# Patient Record
Sex: FEMALE | Race: WHITE | Employment: UNEMPLOYED | URBAN - METROPOLITAN AREA
[De-identification: names, ages, dates, MRNs, and addresses within clinical notes are randomized per-mention and may not be internally consistent; named-entity substitution may affect disease eponyms.]

---

## 2021-01-01 ENCOUNTER — APPOINTMENT (OUTPATIENT)
Dept: GENERAL RADIOLOGY | Age: 71
DRG: 870 | End: 2021-01-01
Payer: MEDICARE

## 2021-01-01 ENCOUNTER — ANESTHESIA (OUTPATIENT)
Dept: OPERATING ROOM | Age: 71
DRG: 870 | End: 2021-01-01
Payer: MEDICARE

## 2021-01-01 ENCOUNTER — HOSPITAL ENCOUNTER (INPATIENT)
Age: 71
LOS: 14 days | DRG: 870 | End: 2021-04-26
Attending: EMERGENCY MEDICINE | Admitting: INTERNAL MEDICINE
Payer: MEDICARE

## 2021-01-01 ENCOUNTER — APPOINTMENT (OUTPATIENT)
Dept: ULTRASOUND IMAGING | Age: 71
DRG: 870 | End: 2021-01-01
Payer: MEDICARE

## 2021-01-01 ENCOUNTER — ANESTHESIA EVENT (OUTPATIENT)
Dept: OPERATING ROOM | Age: 71
DRG: 870 | End: 2021-01-01
Payer: MEDICARE

## 2021-01-01 ENCOUNTER — APPOINTMENT (OUTPATIENT)
Dept: CT IMAGING | Age: 71
DRG: 870 | End: 2021-01-01
Payer: MEDICARE

## 2021-01-01 VITALS
DIASTOLIC BLOOD PRESSURE: 70 MMHG | RESPIRATION RATE: 25 BRPM | OXYGEN SATURATION: 95 % | TEMPERATURE: 96.4 F | HEIGHT: 62 IN | BODY MASS INDEX: 41.24 KG/M2 | WEIGHT: 224.1 LBS | SYSTOLIC BLOOD PRESSURE: 124 MMHG

## 2021-01-01 DIAGNOSIS — J96.01 ACUTE RESPIRATORY FAILURE WITH HYPOXIA (HCC): ICD-10-CM

## 2021-01-01 DIAGNOSIS — A41.9 SEPTICEMIA (HCC): Primary | ICD-10-CM

## 2021-01-01 DIAGNOSIS — R73.9 HYPERGLYCEMIA: ICD-10-CM

## 2021-01-01 LAB
(1,3)-BETA-D-GLUCAN (FUNGITELL) INTERPRETATION: NORMAL
(1,3)-BETA-D-GLUCAN (FUNGITELL): NORMAL PG/ML
ABO/RH: NORMAL
ALBUMIN SERPL-MCNC: 2 G/DL (ref 3.5–5.2)
ALBUMIN SERPL-MCNC: 2.1 G/DL (ref 3.5–5.2)
ALBUMIN SERPL-MCNC: 2.2 G/DL (ref 3.5–5.2)
ALBUMIN SERPL-MCNC: 2.2 G/DL (ref 3.5–5.2)
ALBUMIN SERPL-MCNC: 2.3 G/DL (ref 3.5–5.2)
ALBUMIN SERPL-MCNC: 2.4 G/DL (ref 3.5–5.2)
ALBUMIN SERPL-MCNC: 2.4 G/DL (ref 3.5–5.2)
ALBUMIN SERPL-MCNC: 2.5 G/DL (ref 3.5–5.2)
ALBUMIN SERPL-MCNC: 2.6 G/DL (ref 3.5–5.2)
ALBUMIN SERPL-MCNC: 2.8 G/DL (ref 3.5–5.2)
ALBUMIN SERPL-MCNC: 3.1 G/DL (ref 3.5–5.2)
ALP BLD-CCNC: 102 U/L (ref 35–104)
ALP BLD-CCNC: 108 U/L (ref 35–104)
ALP BLD-CCNC: 127 U/L (ref 35–104)
ALP BLD-CCNC: 169 U/L (ref 35–104)
ALP BLD-CCNC: 255 U/L (ref 35–104)
ALP BLD-CCNC: 307 U/L (ref 35–104)
ALP BLD-CCNC: 371 U/L (ref 35–104)
ALP BLD-CCNC: 396 U/L (ref 35–104)
ALP BLD-CCNC: 413 U/L (ref 35–104)
ALP BLD-CCNC: 493 U/L (ref 35–104)
ALP BLD-CCNC: 520 U/L (ref 35–104)
ALP BLD-CCNC: 63 U/L (ref 35–104)
ALP BLD-CCNC: 66 U/L (ref 35–104)
ALP BLD-CCNC: 82 U/L (ref 35–104)
ALP BLD-CCNC: 82 U/L (ref 35–104)
ALT SERPL-CCNC: 11 U/L (ref 0–32)
ALT SERPL-CCNC: 12 U/L (ref 0–32)
ALT SERPL-CCNC: 12 U/L (ref 0–32)
ALT SERPL-CCNC: 13 U/L (ref 0–32)
ALT SERPL-CCNC: 14 U/L (ref 0–32)
ALT SERPL-CCNC: 16 U/L (ref 0–32)
ALT SERPL-CCNC: 17 U/L (ref 0–32)
ALT SERPL-CCNC: 23 U/L (ref 0–32)
ALT SERPL-CCNC: 26 U/L (ref 0–32)
ALT SERPL-CCNC: 28 U/L (ref 0–32)
ALT SERPL-CCNC: 32 U/L (ref 0–32)
ALT SERPL-CCNC: 35 U/L (ref 0–32)
ALT SERPL-CCNC: 36 U/L (ref 0–32)
ALT SERPL-CCNC: 40 U/L (ref 0–32)
ALT SERPL-CCNC: 43 U/L (ref 0–32)
ANION GAP SERPL CALCULATED.3IONS-SCNC: 10 MMOL/L (ref 7–16)
ANION GAP SERPL CALCULATED.3IONS-SCNC: 11 MMOL/L (ref 7–16)
ANION GAP SERPL CALCULATED.3IONS-SCNC: 12 MMOL/L (ref 7–16)
ANION GAP SERPL CALCULATED.3IONS-SCNC: 13 MMOL/L (ref 7–16)
ANION GAP SERPL CALCULATED.3IONS-SCNC: 15 MMOL/L (ref 7–16)
ANION GAP SERPL CALCULATED.3IONS-SCNC: 17 MMOL/L (ref 7–16)
ANION GAP SERPL CALCULATED.3IONS-SCNC: 20 MMOL/L (ref 7–16)
ANION GAP SERPL CALCULATED.3IONS-SCNC: 8 MMOL/L (ref 7–16)
ANION GAP SERPL CALCULATED.3IONS-SCNC: 8 MMOL/L (ref 7–16)
ANION GAP SERPL CALCULATED.3IONS-SCNC: 9 MMOL/L (ref 7–16)
ANION GAP SERPL CALCULATED.3IONS-SCNC: ABNORMAL MMOL/L (ref 7–16)
ANISOCYTOSIS: ABNORMAL
ANTIBODY SCREEN: NORMAL
ANTISTREPTOLYSIN-O: 513 IU/ML (ref 0–200)
APTT: 27.9 SEC (ref 24.5–35.1)
AST SERPL-CCNC: 29 U/L (ref 0–31)
AST SERPL-CCNC: 29 U/L (ref 0–31)
AST SERPL-CCNC: 32 U/L (ref 0–31)
AST SERPL-CCNC: 33 U/L (ref 0–31)
AST SERPL-CCNC: 37 U/L (ref 0–31)
AST SERPL-CCNC: 45 U/L (ref 0–31)
AST SERPL-CCNC: 45 U/L (ref 0–31)
AST SERPL-CCNC: 47 U/L (ref 0–31)
AST SERPL-CCNC: 53 U/L (ref 0–31)
AST SERPL-CCNC: 54 U/L (ref 0–31)
AST SERPL-CCNC: 56 U/L (ref 0–31)
AST SERPL-CCNC: 57 U/L (ref 0–31)
AST SERPL-CCNC: 61 U/L (ref 0–31)
AST SERPL-CCNC: 66 U/L (ref 0–31)
AST SERPL-CCNC: 74 U/L (ref 0–31)
B.E.: -2.1 MMOL/L (ref -3–3)
B.E.: -2.9 MMOL/L (ref -3–3)
B.E.: -3.3 MMOL/L (ref -3–3)
B.E.: -3.3 MMOL/L (ref -3–3)
B.E.: -3.6 MMOL/L (ref -3–3)
B.E.: -3.9 MMOL/L (ref -3–3)
B.E.: -4.9 MMOL/L (ref -3–3)
B.E.: -5.3 MMOL/L (ref -3–3)
B.E.: -8.4 MMOL/L (ref -3–3)
B.E.: 0.2 MMOL/L (ref -3–3)
BACTERIA: ABNORMAL /HPF
BASOPHILIC STIPPLING: ABNORMAL
BASOPHILS ABSOLUTE: 0 E9/L (ref 0–0.2)
BASOPHILS ABSOLUTE: 0.08 E9/L (ref 0–0.2)
BASOPHILS ABSOLUTE: 0.13 E9/L (ref 0–0.2)
BASOPHILS RELATIVE PERCENT: 0 % (ref 0–2)
BASOPHILS RELATIVE PERCENT: 0.1 % (ref 0–2)
BASOPHILS RELATIVE PERCENT: 0.2 % (ref 0–2)
BASOPHILS RELATIVE PERCENT: 0.4 % (ref 0–2)
BASOPHILS RELATIVE PERCENT: 0.4 % (ref 0–2)
BASOPHILS RELATIVE PERCENT: 0.6 % (ref 0–2)
BASOPHILS RELATIVE PERCENT: 0.9 % (ref 0–2)
BETA-HYDROXYBUTYRATE: 3.87 MMOL/L (ref 0.02–0.27)
BILIRUB SERPL-MCNC: 0.2 MG/DL (ref 0–1.2)
BILIRUB SERPL-MCNC: 0.2 MG/DL (ref 0–1.2)
BILIRUB SERPL-MCNC: 0.3 MG/DL (ref 0–1.2)
BILIRUB SERPL-MCNC: 0.4 MG/DL (ref 0–1.2)
BILIRUB SERPL-MCNC: 0.4 MG/DL (ref 0–1.2)
BILIRUB SERPL-MCNC: <0.2 MG/DL (ref 0–1.2)
BILIRUBIN URINE: NEGATIVE
BLOOD BANK DISPENSE STATUS: NORMAL
BLOOD BANK PRODUCT CODE: NORMAL
BLOOD CULTURE, ROUTINE: NORMAL
BLOOD CULTURE, ROUTINE: NORMAL
BLOOD, URINE: ABNORMAL
BPU ID: NORMAL
BUN BLDV-MCNC: 23 MG/DL (ref 8–23)
BUN BLDV-MCNC: 24 MG/DL (ref 8–23)
BUN BLDV-MCNC: 24 MG/DL (ref 8–23)
BUN BLDV-MCNC: 25 MG/DL (ref 8–23)
BUN BLDV-MCNC: 28 MG/DL (ref 8–23)
BUN BLDV-MCNC: 29 MG/DL (ref 8–23)
BUN BLDV-MCNC: 32 MG/DL (ref 6–23)
BUN BLDV-MCNC: 34 MG/DL (ref 8–23)
BUN BLDV-MCNC: 36 MG/DL (ref 6–23)
BUN BLDV-MCNC: 36 MG/DL (ref 8–23)
BUN BLDV-MCNC: 36 MG/DL (ref 8–23)
BUN BLDV-MCNC: 37 MG/DL (ref 6–23)
BUN BLDV-MCNC: 37 MG/DL (ref 8–23)
BUN BLDV-MCNC: 38 MG/DL (ref 6–23)
BUN BLDV-MCNC: 38 MG/DL (ref 8–23)
BUN BLDV-MCNC: 39 MG/DL (ref 6–23)
BUN BLDV-MCNC: 39 MG/DL (ref 6–23)
BUN BLDV-MCNC: ABNORMAL MG/DL (ref 8–23)
BURR CELLS: ABNORMAL
C-REACTIVE PROTEIN: 14.9 MG/DL (ref 0–0.4)
C-REACTIVE PROTEIN: 2.9 MG/DL (ref 0–0.4)
C-REACTIVE PROTEIN: 31.4 MG/DL (ref 0–0.4)
C-REACTIVE PROTEIN: 8.5 MG/DL (ref 0–0.4)
CALCIUM SERPL-MCNC: 7.9 MG/DL (ref 8.6–10.2)
CALCIUM SERPL-MCNC: 8 MG/DL (ref 8.6–10.2)
CALCIUM SERPL-MCNC: 8.1 MG/DL (ref 8.6–10.2)
CALCIUM SERPL-MCNC: 8.1 MG/DL (ref 8.6–10.2)
CALCIUM SERPL-MCNC: 8.4 MG/DL (ref 8.6–10.2)
CALCIUM SERPL-MCNC: 8.5 MG/DL (ref 8.6–10.2)
CALCIUM SERPL-MCNC: 8.6 MG/DL (ref 8.6–10.2)
CALCIUM SERPL-MCNC: 8.6 MG/DL (ref 8.6–10.2)
CALCIUM SERPL-MCNC: 8.7 MG/DL (ref 8.6–10.2)
CALCIUM SERPL-MCNC: 8.7 MG/DL (ref 8.6–10.2)
CALCIUM SERPL-MCNC: 8.8 MG/DL (ref 8.6–10.2)
CALCIUM SERPL-MCNC: 8.9 MG/DL (ref 8.6–10.2)
CALCIUM SERPL-MCNC: 9 MG/DL (ref 8.6–10.2)
CALCIUM SERPL-MCNC: 9.4 MG/DL (ref 8.6–10.2)
CALCIUM SERPL-MCNC: ABNORMAL MG/DL (ref 8.6–10.2)
CHLORIDE BLD-SCNC: 101 MMOL/L (ref 98–107)
CHLORIDE BLD-SCNC: 102 MMOL/L (ref 98–107)
CHLORIDE BLD-SCNC: 103 MMOL/L (ref 98–107)
CHLORIDE BLD-SCNC: 105 MMOL/L (ref 98–107)
CHLORIDE BLD-SCNC: 106 MMOL/L (ref 98–107)
CHLORIDE BLD-SCNC: 107 MMOL/L (ref 98–107)
CHLORIDE BLD-SCNC: 108 MMOL/L (ref 98–107)
CHLORIDE BLD-SCNC: 109 MMOL/L (ref 98–107)
CHLORIDE BLD-SCNC: 109 MMOL/L (ref 98–107)
CHLORIDE BLD-SCNC: 110 MMOL/L (ref 98–107)
CHLORIDE BLD-SCNC: 110 MMOL/L (ref 98–107)
CHLORIDE BLD-SCNC: ABNORMAL MMOL/L (ref 98–107)
CLARITY: CLEAR
CO2: 16 MMOL/L (ref 22–29)
CO2: 16 MMOL/L (ref 22–29)
CO2: 19 MMOL/L (ref 22–29)
CO2: 20 MMOL/L (ref 22–29)
CO2: 20 MMOL/L (ref 22–29)
CO2: 21 MMOL/L (ref 22–29)
CO2: 22 MMOL/L (ref 22–29)
CO2: 23 MMOL/L (ref 22–29)
CO2: 24 MMOL/L (ref 22–29)
CO2: 24 MMOL/L (ref 22–29)
CO2: 25 MMOL/L (ref 22–29)
CO2: 25 MMOL/L (ref 22–29)
CO2: ABNORMAL MMOL/L (ref 22–29)
COHB: 0.2 % (ref 0–1.5)
COHB: 0.2 % (ref 0–1.5)
COHB: 0.3 % (ref 0–1.5)
COLOR: YELLOW
COMMENT: ABNORMAL
CREAT SERPL-MCNC: 0.9 MG/DL (ref 0.5–1)
CREAT SERPL-MCNC: 1 MG/DL (ref 0.5–1)
CREAT SERPL-MCNC: 1.1 MG/DL (ref 0.5–1)
CREAT SERPL-MCNC: 1.1 MG/DL (ref 0.5–1)
CREAT SERPL-MCNC: 1.2 MG/DL (ref 0.5–1)
CREAT SERPL-MCNC: 1.4 MG/DL (ref 0.5–1)
CREAT SERPL-MCNC: 1.5 MG/DL (ref 0.5–1)
CREAT SERPL-MCNC: 1.6 MG/DL (ref 0.5–1)
CREAT SERPL-MCNC: 1.6 MG/DL (ref 0.5–1)
CREAT SERPL-MCNC: 1.7 MG/DL (ref 0.5–1)
CREAT SERPL-MCNC: 1.8 MG/DL (ref 0.5–1)
CREAT SERPL-MCNC: 2 MG/DL (ref 0.5–1)
CREAT SERPL-MCNC: ABNORMAL MG/DL (ref 0.5–1)
CRITICAL: ABNORMAL
CULTURE, BLOOD 2: NORMAL
CULTURE, BLOOD 2: NORMAL
CULTURE, RESPIRATORY: NORMAL
CULTURE, RESPIRATORY: NORMAL
D DIMER: 1845 NG/ML DDU
D DIMER: 2618 NG/ML DDU
D DIMER: 771 NG/ML DDU
D DIMER: 971 NG/ML DDU
DATE ANALYZED: ABNORMAL
DATE OF COLLECTION: ABNORMAL
DELIVERY SYSTEMS: ABNORMAL
DELIVERY SYSTEMS: NORMAL
DESCRIPTION BLOOD BANK: NORMAL
DEVICE: ABNORMAL
DEVICE: NORMAL
EKG ATRIAL RATE: 110 BPM
EKG ATRIAL RATE: 94 BPM
EKG P AXIS: 32 DEGREES
EKG P AXIS: 35 DEGREES
EKG P-R INTERVAL: 116 MS
EKG P-R INTERVAL: 126 MS
EKG Q-T INTERVAL: 386 MS
EKG Q-T INTERVAL: 422 MS
EKG QRS DURATION: 110 MS
EKG QRS DURATION: 120 MS
EKG QTC CALCULATION (BAZETT): 522 MS
EKG QTC CALCULATION (BAZETT): 527 MS
EKG R AXIS: -152 DEGREES
EKG R AXIS: 166 DEGREES
EKG T AXIS: 24 DEGREES
EKG T AXIS: 57 DEGREES
EKG VENTRICULAR RATE: 110 BPM
EKG VENTRICULAR RATE: 94 BPM
EOSINOPHILS ABSOLUTE: 0 E9/L (ref 0.05–0.5)
EOSINOPHILS ABSOLUTE: 0.12 E9/L (ref 0.05–0.5)
EOSINOPHILS ABSOLUTE: 0.13 E9/L (ref 0.05–0.5)
EOSINOPHILS ABSOLUTE: 0.14 E9/L (ref 0.05–0.5)
EOSINOPHILS ABSOLUTE: 0.16 E9/L (ref 0.05–0.5)
EOSINOPHILS ABSOLUTE: 0.2 E9/L (ref 0.05–0.5)
EOSINOPHILS ABSOLUTE: 0.29 E9/L (ref 0.05–0.5)
EOSINOPHILS RELATIVE PERCENT: 0 % (ref 0–6)
EOSINOPHILS RELATIVE PERCENT: 0.3 % (ref 0–6)
EOSINOPHILS RELATIVE PERCENT: 0.9 % (ref 0–6)
EOSINOPHILS RELATIVE PERCENT: 0.9 % (ref 0–6)
EOSINOPHILS RELATIVE PERCENT: 1 % (ref 0–6)
EOSINOPHILS RELATIVE PERCENT: 1 % (ref 0–6)
EOSINOPHILS RELATIVE PERCENT: 1.5 % (ref 0–6)
EOSINOPHILS RELATIVE PERCENT: 1.5 % (ref 0–6)
EOSINOPHILS RELATIVE PERCENT: 1.8 % (ref 0–6)
EPITHELIAL CELLS, UA: ABNORMAL /HPF
FERRITIN: 319 NG/ML
FERRITIN: 508 NG/ML
FIBRINOGEN: 431 MG/DL (ref 225–540)
FIBRINOGEN: >700 MG/DL (ref 225–540)
FIO2 ARTERIAL: 100
FIO2 ARTERIAL: 90
FIO2: 100 %
FIO2: 50 %
GFR AFRICAN AMERICAN: 30
GFR AFRICAN AMERICAN: 33
GFR AFRICAN AMERICAN: 36
GFR AFRICAN AMERICAN: 38
GFR AFRICAN AMERICAN: 38
GFR AFRICAN AMERICAN: 41
GFR AFRICAN AMERICAN: 45
GFR AFRICAN AMERICAN: 53
GFR AFRICAN AMERICAN: 59
GFR AFRICAN AMERICAN: 59
GFR AFRICAN AMERICAN: >60
GFR AFRICAN AMERICAN: >60
GFR AFRICAN AMERICAN: ABNORMAL
GFR NON-AFRICAN AMERICAN: 25 ML/MIN/1.73
GFR NON-AFRICAN AMERICAN: 28 ML/MIN/1.73
GFR NON-AFRICAN AMERICAN: 30 ML/MIN/1.73
GFR NON-AFRICAN AMERICAN: 32 ML/MIN/1.73
GFR NON-AFRICAN AMERICAN: 32 ML/MIN/1.73
GFR NON-AFRICAN AMERICAN: 34 ML/MIN/1.73
GFR NON-AFRICAN AMERICAN: 37 ML/MIN/1.73
GFR NON-AFRICAN AMERICAN: 44 ML/MIN/1.73
GFR NON-AFRICAN AMERICAN: 49 ML/MIN/1.73
GFR NON-AFRICAN AMERICAN: 49 ML/MIN/1.73
GFR NON-AFRICAN AMERICAN: 55 ML/MIN/1.73
GFR NON-AFRICAN AMERICAN: >60 ML/MIN/1.73
GFR NON-AFRICAN AMERICAN: ABNORMAL ML/MIN/1.73
GLUCOSE BLD-MCNC: 110 MG/DL (ref 74–99)
GLUCOSE BLD-MCNC: 129 MG/DL (ref 74–99)
GLUCOSE BLD-MCNC: 133 MG/DL (ref 74–99)
GLUCOSE BLD-MCNC: 134 MG/DL (ref 74–99)
GLUCOSE BLD-MCNC: 139 MG/DL (ref 74–99)
GLUCOSE BLD-MCNC: 162 MG/DL (ref 74–99)
GLUCOSE BLD-MCNC: 165 MG/DL (ref 74–99)
GLUCOSE BLD-MCNC: 193 MG/DL (ref 74–99)
GLUCOSE BLD-MCNC: 199 MG/DL (ref 74–99)
GLUCOSE BLD-MCNC: 221 MG/DL (ref 74–99)
GLUCOSE BLD-MCNC: 234 MG/DL (ref 74–99)
GLUCOSE BLD-MCNC: 244 MG/DL (ref 74–99)
GLUCOSE BLD-MCNC: 272 MG/DL (ref 74–99)
GLUCOSE BLD-MCNC: 318 MG/DL (ref 74–99)
GLUCOSE BLD-MCNC: 339 MG/DL (ref 74–99)
GLUCOSE BLD-MCNC: 384 MG/DL (ref 74–99)
GLUCOSE BLD-MCNC: 454 MG/DL (ref 74–99)
GLUCOSE BLD-MCNC: 62 MG/DL (ref 74–99)
GLUCOSE BLD-MCNC: 72 MG/DL (ref 74–99)
GLUCOSE BLD-MCNC: ABNORMAL MG/DL (ref 74–99)
GLUCOSE URINE: 500 MG/DL
HCO3 ARTERIAL: 15.1 MMOL/L (ref 22–26)
HCO3 ARTERIAL: 22.2 MMOL/L (ref 22–26)
HCO3: 20.4 MMOL/L (ref 22–26)
HCO3: 20.6 MMOL/L (ref 22–26)
HCO3: 20.7 MMOL/L (ref 22–26)
HCO3: 22.1 MMOL/L (ref 22–26)
HCO3: 22.1 MMOL/L (ref 22–26)
HCO3: 22.2 MMOL/L (ref 22–26)
HCO3: 22.9 MMOL/L (ref 22–26)
HCO3: 24.8 MMOL/L (ref 22–26)
HCT VFR BLD CALC: 23 % (ref 34–48)
HCT VFR BLD CALC: 23.1 % (ref 34–48)
HCT VFR BLD CALC: 23.8 % (ref 34–48)
HCT VFR BLD CALC: 23.8 % (ref 34–48)
HCT VFR BLD CALC: 24 % (ref 34–48)
HCT VFR BLD CALC: 24.7 % (ref 34–48)
HCT VFR BLD CALC: 25 % (ref 34–48)
HCT VFR BLD CALC: 25.2 % (ref 34–48)
HCT VFR BLD CALC: 25.9 % (ref 34–48)
HCT VFR BLD CALC: 26.7 % (ref 34–48)
HCT VFR BLD CALC: 27.3 % (ref 34–48)
HCT VFR BLD CALC: 27.4 % (ref 34–48)
HCT VFR BLD CALC: 28.6 % (ref 34–48)
HCT VFR BLD CALC: 29.2 % (ref 34–48)
HCT VFR BLD CALC: 29.2 % (ref 34–48)
HCT VFR BLD CALC: 29.7 % (ref 34–48)
HCT VFR BLD CALC: 30.4 % (ref 34–48)
HCT VFR BLD CALC: 30.6 % (ref 34–48)
HCT VFR BLD CALC: 30.8 % (ref 34–48)
HEMOGLOBIN: 7 G/DL (ref 11.5–15.5)
HEMOGLOBIN: 7.1 G/DL (ref 11.5–15.5)
HEMOGLOBIN: 7.2 G/DL (ref 11.5–15.5)
HEMOGLOBIN: 7.3 G/DL (ref 11.5–15.5)
HEMOGLOBIN: 7.4 G/DL (ref 11.5–15.5)
HEMOGLOBIN: 7.8 G/DL (ref 11.5–15.5)
HEMOGLOBIN: 7.9 G/DL (ref 11.5–15.5)
HEMOGLOBIN: 8.3 G/DL (ref 11.5–15.5)
HEMOGLOBIN: 8.4 G/DL (ref 11.5–15.5)
HEMOGLOBIN: 8.5 G/DL (ref 11.5–15.5)
HEMOGLOBIN: 9.1 G/DL (ref 11.5–15.5)
HEMOGLOBIN: 9.3 G/DL (ref 11.5–15.5)
HEMOGLOBIN: 9.4 G/DL (ref 11.5–15.5)
HEMOGLOBIN: 9.5 G/DL (ref 11.5–15.5)
HEMOGLOBIN: 9.5 G/DL (ref 11.5–15.5)
HHB: 10.7 % (ref 0–5)
HHB: 2.8 % (ref 0–5)
HHB: 3.4 % (ref 0–5)
HHB: 3.7 % (ref 0–5)
HHB: 5.5 % (ref 0–5)
HHB: 5.7 % (ref 0–5)
HHB: 7.9 % (ref 0–5)
HHB: 8.8 % (ref 0–5)
HYALINE CASTS: ABNORMAL /LPF (ref 0–2)
HYPOCHROMIA: ABNORMAL
IMMATURE GRANULOCYTES #: 2.14 E9/L
IMMATURE GRANULOCYTES %: 15.8 % (ref 0–5)
INR BLD: 1.2
INR BLD: 1.3
KETONES, URINE: 15 MG/DL
L. PNEUMOPHILA SEROGP 1 UR AG: NORMAL
LAB: ABNORMAL
LACTATE DEHYDROGENASE: 412 U/L (ref 135–214)
LACTATE DEHYDROGENASE: 441 U/L (ref 135–214)
LACTIC ACID, SEPSIS: 0.9 MMOL/L (ref 0.5–1.9)
LACTIC ACID, SEPSIS: 0.9 MMOL/L (ref 0.5–1.9)
LACTIC ACID: 1.3 MMOL/L (ref 0.5–2.2)
LACTIC ACID: 1.4 MMOL/L (ref 0.5–2.2)
LACTIC ACID: 2.3 MMOL/L (ref 0.5–2.2)
LEUKOCYTE ESTERASE, URINE: NEGATIVE
LIPASE: 118 U/L (ref 13–60)
LYMPHOCYTES ABSOLUTE: 0.41 E9/L (ref 1.5–4)
LYMPHOCYTES ABSOLUTE: 0.43 E9/L (ref 1.5–4)
LYMPHOCYTES ABSOLUTE: 0.49 E9/L (ref 1.5–4)
LYMPHOCYTES ABSOLUTE: 0.53 E9/L (ref 1.5–4)
LYMPHOCYTES ABSOLUTE: 0.54 E9/L (ref 1.5–4)
LYMPHOCYTES ABSOLUTE: 0.68 E9/L (ref 1.5–4)
LYMPHOCYTES ABSOLUTE: 0.78 E9/L (ref 1.5–4)
LYMPHOCYTES ABSOLUTE: 0.8 E9/L (ref 1.5–4)
LYMPHOCYTES ABSOLUTE: 0.8 E9/L (ref 1.5–4)
LYMPHOCYTES ABSOLUTE: 0.82 E9/L (ref 1.5–4)
LYMPHOCYTES ABSOLUTE: 0.94 E9/L (ref 1.5–4)
LYMPHOCYTES ABSOLUTE: 1.07 E9/L (ref 1.5–4)
LYMPHOCYTES ABSOLUTE: 1.1 E9/L (ref 1.5–4)
LYMPHOCYTES ABSOLUTE: 1.25 E9/L (ref 1.5–4)
LYMPHOCYTES ABSOLUTE: 1.34 E9/L (ref 1.5–4)
LYMPHOCYTES ABSOLUTE: 1.38 E9/L (ref 1.5–4)
LYMPHOCYTES ABSOLUTE: 1.46 E9/L (ref 1.5–4)
LYMPHOCYTES ABSOLUTE: 1.77 E9/L (ref 1.5–4)
LYMPHOCYTES ABSOLUTE: 2.34 E9/L (ref 1.5–4)
LYMPHOCYTES RELATIVE PERCENT: 10.2 % (ref 20–42)
LYMPHOCYTES RELATIVE PERCENT: 10.5 % (ref 20–42)
LYMPHOCYTES RELATIVE PERCENT: 19 % (ref 20–42)
LYMPHOCYTES RELATIVE PERCENT: 2.7 % (ref 20–42)
LYMPHOCYTES RELATIVE PERCENT: 3 % (ref 20–42)
LYMPHOCYTES RELATIVE PERCENT: 3.5 % (ref 20–42)
LYMPHOCYTES RELATIVE PERCENT: 4.4 % (ref 20–42)
LYMPHOCYTES RELATIVE PERCENT: 5 % (ref 20–42)
LYMPHOCYTES RELATIVE PERCENT: 5 % (ref 20–42)
LYMPHOCYTES RELATIVE PERCENT: 6 % (ref 20–42)
LYMPHOCYTES RELATIVE PERCENT: 6.3 % (ref 20–42)
LYMPHOCYTES RELATIVE PERCENT: 7 % (ref 20–42)
LYMPHOCYTES RELATIVE PERCENT: 7 % (ref 20–42)
LYMPHOCYTES RELATIVE PERCENT: 8 % (ref 20–42)
LYMPHOCYTES RELATIVE PERCENT: 8.8 % (ref 20–42)
Lab: ABNORMAL
MAGNESIUM: 1.8 MG/DL (ref 1.6–2.6)
MAGNESIUM: 1.9 MG/DL (ref 1.6–2.6)
MAGNESIUM: 2 MG/DL (ref 1.6–2.6)
MAGNESIUM: 2.1 MG/DL (ref 1.6–2.6)
MAGNESIUM: ABNORMAL MG/DL (ref 1.6–2.6)
MCH RBC QN AUTO: 30.2 PG (ref 26–35)
MCH RBC QN AUTO: 30.3 PG (ref 26–35)
MCH RBC QN AUTO: 30.3 PG (ref 26–35)
MCH RBC QN AUTO: 30.4 PG (ref 26–35)
MCH RBC QN AUTO: 30.6 PG (ref 26–35)
MCH RBC QN AUTO: 30.6 PG (ref 26–35)
MCH RBC QN AUTO: 30.7 PG (ref 26–35)
MCH RBC QN AUTO: 30.7 PG (ref 26–35)
MCH RBC QN AUTO: 30.8 PG (ref 26–35)
MCH RBC QN AUTO: 30.9 PG (ref 26–35)
MCH RBC QN AUTO: 31 PG (ref 26–35)
MCH RBC QN AUTO: 31 PG (ref 26–35)
MCH RBC QN AUTO: 31.1 PG (ref 26–35)
MCH RBC QN AUTO: 31.1 PG (ref 26–35)
MCH RBC QN AUTO: 31.3 PG (ref 26–35)
MCH RBC QN AUTO: 31.3 PG (ref 26–35)
MCHC RBC AUTO-ENTMCNC: 29.4 % (ref 32–34.5)
MCHC RBC AUTO-ENTMCNC: 30.4 % (ref 32–34.5)
MCHC RBC AUTO-ENTMCNC: 30.4 % (ref 32–34.5)
MCHC RBC AUTO-ENTMCNC: 30.5 % (ref 32–34.5)
MCHC RBC AUTO-ENTMCNC: 30.7 % (ref 32–34.5)
MCHC RBC AUTO-ENTMCNC: 30.8 % (ref 32–34.5)
MCHC RBC AUTO-ENTMCNC: 31.1 % (ref 32–34.5)
MCHC RBC AUTO-ENTMCNC: 31.2 % (ref 32–34.5)
MCHC RBC AUTO-ENTMCNC: 31.2 % (ref 32–34.5)
MCHC RBC AUTO-ENTMCNC: 31.3 % (ref 32–34.5)
MCHC RBC AUTO-ENTMCNC: 31.6 % (ref 32–34.5)
MCHC RBC AUTO-ENTMCNC: 31.6 % (ref 32–34.5)
MCHC RBC AUTO-ENTMCNC: 31.8 % (ref 32–34.5)
MCHC RBC AUTO-ENTMCNC: 31.8 % (ref 32–34.5)
MCV RBC AUTO: 100 FL (ref 80–99.9)
MCV RBC AUTO: 100.3 FL (ref 80–99.9)
MCV RBC AUTO: 100.4 FL (ref 80–99.9)
MCV RBC AUTO: 100.7 FL (ref 80–99.9)
MCV RBC AUTO: 100.8 FL (ref 80–99.9)
MCV RBC AUTO: 105.3 FL (ref 80–99.9)
MCV RBC AUTO: 95.3 FL (ref 80–99.9)
MCV RBC AUTO: 95.7 FL (ref 80–99.9)
MCV RBC AUTO: 97.8 FL (ref 80–99.9)
MCV RBC AUTO: 98.1 FL (ref 80–99.9)
MCV RBC AUTO: 98.3 FL (ref 80–99.9)
MCV RBC AUTO: 98.3 FL (ref 80–99.9)
MCV RBC AUTO: 98.6 FL (ref 80–99.9)
MCV RBC AUTO: 99 FL (ref 80–99.9)
MCV RBC AUTO: 99.2 FL (ref 80–99.9)
MCV RBC AUTO: 99.3 FL (ref 80–99.9)
MCV RBC AUTO: 99.6 FL (ref 80–99.9)
METAMYELOCYTES RELATIVE PERCENT: 0.9 % (ref 0–1)
METAMYELOCYTES RELATIVE PERCENT: 1 % (ref 0–1)
METAMYELOCYTES RELATIVE PERCENT: 1.8 % (ref 0–1)
METAMYELOCYTES RELATIVE PERCENT: 1.8 % (ref 0–1)
METAMYELOCYTES RELATIVE PERCENT: 2 % (ref 0–1)
METAMYELOCYTES RELATIVE PERCENT: 3 % (ref 0–1)
METAMYELOCYTES RELATIVE PERCENT: 3 % (ref 0–1)
METAMYELOCYTES RELATIVE PERCENT: 3.6 % (ref 0–1)
METAMYELOCYTES RELATIVE PERCENT: 4.4 % (ref 0–1)
METAMYELOCYTES RELATIVE PERCENT: 7 % (ref 0–1)
METER GLUCOSE: 107 MG/DL (ref 74–99)
METER GLUCOSE: 110 MG/DL (ref 74–99)
METER GLUCOSE: 111 MG/DL (ref 74–99)
METER GLUCOSE: 114 MG/DL (ref 74–99)
METER GLUCOSE: 122 MG/DL (ref 74–99)
METER GLUCOSE: 130 MG/DL (ref 74–99)
METER GLUCOSE: 130 MG/DL (ref 74–99)
METER GLUCOSE: 131 MG/DL (ref 74–99)
METER GLUCOSE: 133 MG/DL (ref 74–99)
METER GLUCOSE: 139 MG/DL (ref 74–99)
METER GLUCOSE: 141 MG/DL (ref 74–99)
METER GLUCOSE: 142 MG/DL (ref 74–99)
METER GLUCOSE: 143 MG/DL (ref 74–99)
METER GLUCOSE: 151 MG/DL (ref 74–99)
METER GLUCOSE: 156 MG/DL (ref 74–99)
METER GLUCOSE: 160 MG/DL (ref 74–99)
METER GLUCOSE: 164 MG/DL (ref 74–99)
METER GLUCOSE: 165 MG/DL (ref 74–99)
METER GLUCOSE: 167 MG/DL (ref 74–99)
METER GLUCOSE: 177 MG/DL (ref 74–99)
METER GLUCOSE: 179 MG/DL (ref 74–99)
METER GLUCOSE: 188 MG/DL (ref 74–99)
METER GLUCOSE: 198 MG/DL (ref 74–99)
METER GLUCOSE: 202 MG/DL (ref 74–99)
METER GLUCOSE: 204 MG/DL (ref 74–99)
METER GLUCOSE: 207 MG/DL (ref 74–99)
METER GLUCOSE: 216 MG/DL (ref 74–99)
METER GLUCOSE: 218 MG/DL (ref 74–99)
METER GLUCOSE: 224 MG/DL (ref 74–99)
METER GLUCOSE: 225 MG/DL (ref 74–99)
METER GLUCOSE: 228 MG/DL (ref 74–99)
METER GLUCOSE: 230 MG/DL (ref 74–99)
METER GLUCOSE: 230 MG/DL (ref 74–99)
METER GLUCOSE: 231 MG/DL (ref 74–99)
METER GLUCOSE: 235 MG/DL (ref 74–99)
METER GLUCOSE: 239 MG/DL (ref 74–99)
METER GLUCOSE: 245 MG/DL (ref 74–99)
METER GLUCOSE: 248 MG/DL (ref 74–99)
METER GLUCOSE: 252 MG/DL (ref 74–99)
METER GLUCOSE: 254 MG/DL (ref 74–99)
METER GLUCOSE: 258 MG/DL (ref 74–99)
METER GLUCOSE: 258 MG/DL (ref 74–99)
METER GLUCOSE: 262 MG/DL (ref 74–99)
METER GLUCOSE: 263 MG/DL (ref 74–99)
METER GLUCOSE: 272 MG/DL (ref 74–99)
METER GLUCOSE: 275 MG/DL (ref 74–99)
METER GLUCOSE: 282 MG/DL (ref 74–99)
METER GLUCOSE: 287 MG/DL (ref 74–99)
METER GLUCOSE: 288 MG/DL (ref 74–99)
METER GLUCOSE: 291 MG/DL (ref 74–99)
METER GLUCOSE: 299 MG/DL (ref 74–99)
METER GLUCOSE: 303 MG/DL (ref 74–99)
METER GLUCOSE: 308 MG/DL (ref 74–99)
METER GLUCOSE: 327 MG/DL (ref 74–99)
METER GLUCOSE: 332 MG/DL (ref 74–99)
METER GLUCOSE: 344 MG/DL (ref 74–99)
METER GLUCOSE: 353 MG/DL (ref 74–99)
METER GLUCOSE: 412 MG/DL (ref 74–99)
METER GLUCOSE: 415 MG/DL (ref 74–99)
METER GLUCOSE: 42 MG/DL (ref 74–99)
METER GLUCOSE: 443 MG/DL (ref 74–99)
METER GLUCOSE: 47 MG/DL (ref 74–99)
METER GLUCOSE: 491 MG/DL (ref 74–99)
METER GLUCOSE: 52 MG/DL (ref 74–99)
METER GLUCOSE: 74 MG/DL (ref 74–99)
METER GLUCOSE: 75 MG/DL (ref 74–99)
METER GLUCOSE: 81 MG/DL (ref 74–99)
METER GLUCOSE: >500 MG/DL (ref 74–99)
METHB: 0.1 % (ref 0–1.5)
METHB: 0.2 % (ref 0–1.5)
METHB: 0.3 % (ref 0–1.5)
METHB: 0.3 % (ref 0–1.5)
MODE: ABNORMAL
MODE: ABNORMAL
MODE: AC
MODE: NORMAL
MONOCYTES ABSOLUTE: 0.13 E9/L (ref 0.1–0.95)
MONOCYTES ABSOLUTE: 0.16 E9/L (ref 0.1–0.95)
MONOCYTES ABSOLUTE: 0.27 E9/L (ref 0.1–0.95)
MONOCYTES ABSOLUTE: 0.49 E9/L (ref 0.1–0.95)
MONOCYTES ABSOLUTE: 0.49 E9/L (ref 0.1–0.95)
MONOCYTES ABSOLUTE: 0.54 E9/L (ref 0.1–0.95)
MONOCYTES ABSOLUTE: 0.56 E9/L (ref 0.1–0.95)
MONOCYTES ABSOLUTE: 0.58 E9/L (ref 0.1–0.95)
MONOCYTES ABSOLUTE: 0.69 E9/L (ref 0.1–0.95)
MONOCYTES ABSOLUTE: 0.69 E9/L (ref 0.1–0.95)
MONOCYTES ABSOLUTE: 0.72 E9/L (ref 0.1–0.95)
MONOCYTES ABSOLUTE: 0.74 E9/L (ref 0.1–0.95)
MONOCYTES ABSOLUTE: 0.8 E9/L (ref 0.1–0.95)
MONOCYTES ABSOLUTE: 0.83 E9/L (ref 0.1–0.95)
MONOCYTES ABSOLUTE: 0.86 E9/L (ref 0.1–0.95)
MONOCYTES ABSOLUTE: 1.02 E9/L (ref 0.1–0.95)
MONOCYTES ABSOLUTE: 1.21 E9/L (ref 0.1–0.95)
MONOCYTES ABSOLUTE: 1.25 E9/L (ref 0.1–0.95)
MONOCYTES ABSOLUTE: 1.45 E9/L (ref 0.1–0.95)
MONOCYTES RELATIVE PERCENT: 1 % (ref 2–12)
MONOCYTES RELATIVE PERCENT: 1 % (ref 2–12)
MONOCYTES RELATIVE PERCENT: 1.8 % (ref 2–12)
MONOCYTES RELATIVE PERCENT: 3 % (ref 2–12)
MONOCYTES RELATIVE PERCENT: 3 % (ref 2–12)
MONOCYTES RELATIVE PERCENT: 3.5 % (ref 2–12)
MONOCYTES RELATIVE PERCENT: 4 % (ref 2–12)
MONOCYTES RELATIVE PERCENT: 5 % (ref 2–12)
MONOCYTES RELATIVE PERCENT: 6.1 % (ref 2–12)
MONOCYTES RELATIVE PERCENT: 6.2 % (ref 2–12)
MONOCYTES RELATIVE PERCENT: 6.3 % (ref 2–12)
MONOCYTES RELATIVE PERCENT: 7 % (ref 2–12)
MONOCYTES RELATIVE PERCENT: 7.1 % (ref 2–12)
MONOCYTES RELATIVE PERCENT: 7.1 % (ref 2–12)
MONOCYTES RELATIVE PERCENT: 8.8 % (ref 2–12)
MYELOCYTE PERCENT: 0.9 % (ref 0–0)
MYELOCYTE PERCENT: 1 % (ref 0–0)
MYELOCYTE PERCENT: 3 % (ref 0–0)
MYELOCYTE PERCENT: 3.5 % (ref 0–0)
MYELOCYTE PERCENT: 3.5 % (ref 0–0)
MYELOCYTE PERCENT: 4 % (ref 0–0)
MYELOCYTE PERCENT: 4.4 % (ref 0–0)
MYELOCYTE PERCENT: 5 % (ref 0–0)
MYELOCYTE PERCENT: 8 % (ref 0–0)
NEUTROPHILS ABSOLUTE: 11.95 E9/L (ref 1.8–7.3)
NEUTROPHILS ABSOLUTE: 12.01 E9/L (ref 1.8–7.3)
NEUTROPHILS ABSOLUTE: 12.19 E9/L (ref 1.8–7.3)
NEUTROPHILS ABSOLUTE: 12.47 E9/L (ref 1.8–7.3)
NEUTROPHILS ABSOLUTE: 12.56 E9/L (ref 1.8–7.3)
NEUTROPHILS ABSOLUTE: 12.72 E9/L (ref 1.8–7.3)
NEUTROPHILS ABSOLUTE: 13.1 E9/L (ref 1.8–7.3)
NEUTROPHILS ABSOLUTE: 14.24 E9/L (ref 1.8–7.3)
NEUTROPHILS ABSOLUTE: 14.6 E9/L (ref 1.8–7.3)
NEUTROPHILS ABSOLUTE: 15.13 E9/L (ref 1.8–7.3)
NEUTROPHILS ABSOLUTE: 15.42 E9/L (ref 1.8–7.3)
NEUTROPHILS ABSOLUTE: 16.11 E9/L (ref 1.8–7.3)
NEUTROPHILS ABSOLUTE: 17.28 E9/L (ref 1.8–7.3)
NEUTROPHILS ABSOLUTE: 18.09 E9/L (ref 1.8–7.3)
NEUTROPHILS ABSOLUTE: 18.51 E9/L (ref 1.8–7.3)
NEUTROPHILS ABSOLUTE: 8.33 E9/L (ref 1.8–7.3)
NEUTROPHILS ABSOLUTE: 8.91 E9/L (ref 1.8–7.3)
NEUTROPHILS ABSOLUTE: 9.33 E9/L (ref 1.8–7.3)
NEUTROPHILS ABSOLUTE: 9.47 E9/L (ref 1.8–7.3)
NEUTROPHILS RELATIVE PERCENT: 65.6 % (ref 43–80)
NEUTROPHILS RELATIVE PERCENT: 66 % (ref 43–80)
NEUTROPHILS RELATIVE PERCENT: 78.9 % (ref 43–80)
NEUTROPHILS RELATIVE PERCENT: 80 % (ref 43–80)
NEUTROPHILS RELATIVE PERCENT: 81 % (ref 43–80)
NEUTROPHILS RELATIVE PERCENT: 81.6 % (ref 43–80)
NEUTROPHILS RELATIVE PERCENT: 82.1 % (ref 43–80)
NEUTROPHILS RELATIVE PERCENT: 82.5 % (ref 43–80)
NEUTROPHILS RELATIVE PERCENT: 83 % (ref 43–80)
NEUTROPHILS RELATIVE PERCENT: 84 % (ref 43–80)
NEUTROPHILS RELATIVE PERCENT: 84.1 % (ref 43–80)
NEUTROPHILS RELATIVE PERCENT: 85 % (ref 43–80)
NEUTROPHILS RELATIVE PERCENT: 87 % (ref 43–80)
NEUTROPHILS RELATIVE PERCENT: 87 % (ref 43–80)
NEUTROPHILS RELATIVE PERCENT: 87.4 % (ref 43–80)
NEUTROPHILS RELATIVE PERCENT: 88 % (ref 43–80)
NEUTROPHILS RELATIVE PERCENT: 89.4 % (ref 43–80)
NEUTROPHILS RELATIVE PERCENT: 91 % (ref 43–80)
NEUTROPHILS RELATIVE PERCENT: 91 % (ref 43–80)
NITRITE, URINE: NEGATIVE
NUCLEATED RED BLOOD CELLS: 0.9 /100 WBC
NUCLEATED RED BLOOD CELLS: 1 /100 WBC
NUCLEATED RED BLOOD CELLS: 1.8 /100 WBC
NUCLEATED RED BLOOD CELLS: 2 /100 WBC
NUCLEATED RED BLOOD CELLS: 3 /100 WBC
O2 CONTENT: 11.5 ML/DL
O2 CONTENT: 11.5 ML/DL
O2 CONTENT: 12.3 ML/DL
O2 CONTENT: 12.5 ML/DL
O2 CONTENT: 13 ML/DL
O2 CONTENT: 13.4 ML/DL
O2 CONTENT: 14 ML/DL
O2 CONTENT: 14.6 ML/DL
O2 SATURATION: 89.2 % (ref 92–98.5)
O2 SATURATION: 91.2 % (ref 92–98.5)
O2 SATURATION: 92.1 % (ref 92–98.5)
O2 SATURATION: 94 % (ref 92–98.5)
O2 SATURATION: 94.3 % (ref 92–98.5)
O2 SATURATION: 94.5 % (ref 92–98.5)
O2 SATURATION: 96.3 % (ref 92–98.5)
O2 SATURATION: 96.6 % (ref 92–98.5)
O2 SATURATION: 97.2 % (ref 92–98.5)
O2 SATURATION: 97.7 % (ref 92–98.5)
O2HB: 88.7 % (ref 94–97)
O2HB: 90.9 % (ref 94–97)
O2HB: 91.6 % (ref 94–97)
O2HB: 94 % (ref 94–97)
O2HB: 94.1 % (ref 94–97)
O2HB: 95.9 % (ref 94–97)
O2HB: 96 % (ref 94–97)
O2HB: 96.8 % (ref 94–97)
OPERATOR ID: 1102
OPERATOR ID: 2323
OPERATOR ID: 3
OPERATOR ID: 3086
OPERATOR ID: 7490
OPERATOR ID: 786
OPERATOR ID: 786
OPERATOR ID: ABNORMAL
OVALOCYTES: ABNORMAL
PATIENT TEMP: 37
PATIENT TEMP: 37 C
PCO2 (TEMP CORRECTED): 38.4 MMHG (ref 35–45)
PCO2 ARTERIAL: 24.7 MMHG (ref 35–45)
PCO2: 35.1 MMHG (ref 35–45)
PCO2: 36.1 MMHG (ref 35–45)
PCO2: 39.5 MMHG (ref 35–45)
PCO2: 40.6 MMHG (ref 35–45)
PCO2: 40.7 MMHG (ref 35–45)
PCO2: 41.7 MMHG (ref 35–45)
PCO2: 42.8 MMHG (ref 35–45)
PCO2: 46.4 MMHG (ref 35–45)
PDW BLD-RTO: 14.9 FL (ref 11.5–15)
PDW BLD-RTO: 14.9 FL (ref 11.5–15)
PDW BLD-RTO: 15.3 FL (ref 11.5–15)
PDW BLD-RTO: 15.3 FL (ref 11.5–15)
PDW BLD-RTO: 15.8 FL (ref 11.5–15)
PDW BLD-RTO: 15.9 FL (ref 11.5–15)
PDW BLD-RTO: 16.1 FL (ref 11.5–15)
PDW BLD-RTO: 16.5 FL (ref 11.5–15)
PDW BLD-RTO: 16.7 FL (ref 11.5–15)
PDW BLD-RTO: 17 FL (ref 11.5–15)
PDW BLD-RTO: 17.1 FL (ref 11.5–15)
PDW BLD-RTO: 17.2 FL (ref 11.5–15)
PDW BLD-RTO: 17.3 FL (ref 11.5–15)
PDW BLD-RTO: 17.3 FL (ref 11.5–15)
PDW BLD-RTO: 17.4 FL (ref 11.5–15)
PDW BLD-RTO: 17.4 FL (ref 11.5–15)
PDW BLD-RTO: 17.6 FL (ref 11.5–15)
PEEP/CPAP: 14 CMH2O
PEEP/CPAP: 15 CMH2O
PEEP/CPAP: 8 CMH2O
PEEP/CPAP: 8 CMH2O
PFO2: 0.62 MMHG/%
PFO2: 0.66 MMHG/%
PFO2: 0.73 MMHG/%
PFO2: 0.74 MMHG/%
PFO2: 1.2 MMHG/%
PFO2: 1.6 MMHG/%
PFO2: 1.89 MMHG/%
PFO2: 1.96 MMHG/%
PH (TEMPERATURE CORRECTED): 7.37 (ref 7.35–7.45)
PH BLOOD GAS: 7.31 (ref 7.35–7.45)
PH BLOOD GAS: 7.32 (ref 7.35–7.45)
PH BLOOD GAS: 7.33 (ref 7.35–7.45)
PH BLOOD GAS: 7.33 (ref 7.35–7.45)
PH BLOOD GAS: 7.34 (ref 7.35–7.45)
PH BLOOD GAS: 7.39 (ref 7.35–7.45)
PH BLOOD GAS: 7.4 (ref 7.35–7.45)
PH BLOOD GAS: 7.41 (ref 7.35–7.45)
PH BLOOD GAS: 7.42 (ref 7.35–7.45)
PH UA: 5.5 (ref 5–9)
PHOSPHORUS: 1.7 MG/DL (ref 2.5–4.5)
PHOSPHORUS: 2.2 MG/DL (ref 2.5–4.5)
PHOSPHORUS: 2.3 MG/DL (ref 2.5–4.5)
PHOSPHORUS: 2.5 MG/DL (ref 2.5–4.5)
PHOSPHORUS: 2.7 MG/DL (ref 2.5–4.5)
PHOSPHORUS: 4.8 MG/DL (ref 2.5–4.5)
PHOSPHORUS: 5.5 MG/DL (ref 2.5–4.5)
PHOSPHORUS: ABNORMAL MG/DL (ref 2.5–4.5)
PLATELET # BLD: 320 E9/L (ref 130–450)
PLATELET # BLD: 321 E9/L (ref 130–450)
PLATELET # BLD: 329 E9/L (ref 130–450)
PLATELET # BLD: 354 E9/L (ref 130–450)
PLATELET # BLD: 356 E9/L (ref 130–450)
PLATELET # BLD: 363 E9/L (ref 130–450)
PLATELET # BLD: 453 E9/L (ref 130–450)
PLATELET # BLD: 470 E9/L (ref 130–450)
PLATELET # BLD: 478 E9/L (ref 130–450)
PLATELET # BLD: 513 E9/L (ref 130–450)
PLATELET # BLD: 541 E9/L (ref 130–450)
PLATELET # BLD: 564 E9/L (ref 130–450)
PLATELET # BLD: 603 E9/L (ref 130–450)
PLATELET # BLD: 610 E9/L (ref 130–450)
PLATELET # BLD: 630 E9/L (ref 130–450)
PLATELET # BLD: 644 E9/L (ref 130–450)
PLATELET # BLD: 644 E9/L (ref 130–450)
PLATELET # BLD: 652 E9/L (ref 130–450)
PLATELET # BLD: 668 E9/L (ref 130–450)
PMV BLD AUTO: 8.8 FL (ref 7–12)
PMV BLD AUTO: 8.9 FL (ref 7–12)
PMV BLD AUTO: 9 FL (ref 7–12)
PMV BLD AUTO: 9.1 FL (ref 7–12)
PMV BLD AUTO: 9.2 FL (ref 7–12)
PMV BLD AUTO: 9.3 FL (ref 7–12)
PMV BLD AUTO: 9.4 FL (ref 7–12)
PMV BLD AUTO: 9.5 FL (ref 7–12)
PMV BLD AUTO: 9.5 FL (ref 7–12)
PMV BLD AUTO: 9.7 FL (ref 7–12)
PO2 (TEMP CORRECTED): 72.8 MMHG (ref 60–80)
PO2 ARTERIAL: 96.7 MMHG (ref 60–80)
PO2: 119.9 MMHG (ref 75–100)
PO2: 61.5 MMHG (ref 75–100)
PO2: 65.9 MMHG (ref 75–100)
PO2: 72.5 MMHG (ref 75–100)
PO2: 74.3 MMHG (ref 75–100)
PO2: 80 MMHG (ref 75–100)
PO2: 94.3 MMHG (ref 75–100)
PO2: 98.2 MMHG (ref 75–100)
POIKILOCYTES: ABNORMAL
POLYCHROMASIA: ABNORMAL
POSITIVE END EXP PRESS: 6 CMH2O
POSITIVE END EXP PRESS: 6 CMH2O
POTASSIUM REFLEX MAGNESIUM: 3.6 MMOL/L (ref 3.5–5)
POTASSIUM REFLEX MAGNESIUM: 3.8 MMOL/L (ref 3.5–5)
POTASSIUM REFLEX MAGNESIUM: 3.9 MMOL/L (ref 3.5–5)
POTASSIUM REFLEX MAGNESIUM: 4 MMOL/L (ref 3.5–5)
POTASSIUM REFLEX MAGNESIUM: 4 MMOL/L (ref 3.5–5)
POTASSIUM REFLEX MAGNESIUM: 4.1 MMOL/L (ref 3.5–5)
POTASSIUM REFLEX MAGNESIUM: 4.4 MMOL/L (ref 3.5–5)
POTASSIUM REFLEX MAGNESIUM: 4.5 MMOL/L (ref 3.5–5)
POTASSIUM REFLEX MAGNESIUM: 4.9 MMOL/L (ref 3.5–5)
POTASSIUM SERPL-SCNC: 3.8 MMOL/L (ref 3.5–5)
POTASSIUM SERPL-SCNC: 3.8 MMOL/L (ref 3.5–5)
POTASSIUM SERPL-SCNC: 3.9 MMOL/L (ref 3.5–5)
POTASSIUM SERPL-SCNC: 4.1 MMOL/L (ref 3.5–5)
POTASSIUM SERPL-SCNC: 4.1 MMOL/L (ref 3.5–5)
POTASSIUM SERPL-SCNC: 4.3 MMOL/L (ref 3.5–5)
POTASSIUM SERPL-SCNC: 4.3 MMOL/L (ref 3.5–5)
POTASSIUM SERPL-SCNC: 4.4 MMOL/L (ref 3.5–5)
POTASSIUM SERPL-SCNC: 4.6 MMOL/L (ref 3.5–5)
POTASSIUM SERPL-SCNC: 4.9 MMOL/L (ref 3.5–5)
POTASSIUM SERPL-SCNC: ABNORMAL MMOL/L (ref 3.5–5)
PRESSURE SUPPORT: 12 CMH2O
PRESSURE SUPPORT: 12 CMH2O
PROCALCITONIN: 0.27 NG/ML (ref 0–0.08)
PROCALCITONIN: 0.6 NG/ML (ref 0–0.08)
PROCALCITONIN: 0.8 NG/ML (ref 0–0.08)
PROCALCITONIN: 1.75 NG/ML (ref 0–0.08)
PROTEIN UA: 100 MG/DL
PROTHROMBIN TIME: 14.3 SEC (ref 9.3–12.4)
PROTHROMBIN TIME: 14.6 SEC (ref 9.3–12.4)
PS: 14 CMH20
RBC # BLD: 2.26 E12/L (ref 3.5–5.5)
RBC # BLD: 2.32 E12/L (ref 3.5–5.5)
RBC # BLD: 2.34 E12/L (ref 3.5–5.5)
RBC # BLD: 2.38 E12/L (ref 3.5–5.5)
RBC # BLD: 2.38 E12/L (ref 3.5–5.5)
RBC # BLD: 2.52 E12/L (ref 3.5–5.5)
RBC # BLD: 2.52 E12/L (ref 3.5–5.5)
RBC # BLD: 2.58 E12/L (ref 3.5–5.5)
RBC # BLD: 2.58 E12/L (ref 3.5–5.5)
RBC # BLD: 2.72 E12/L (ref 3.5–5.5)
RBC # BLD: 2.73 E12/L (ref 3.5–5.5)
RBC # BLD: 2.73 E12/L (ref 3.5–5.5)
RBC # BLD: 2.95 E12/L (ref 3.5–5.5)
RBC # BLD: 2.96 E12/L (ref 3.5–5.5)
RBC # BLD: 3 E12/L (ref 3.5–5.5)
RBC # BLD: 3.02 E12/L (ref 3.5–5.5)
RBC # BLD: 3.05 E12/L (ref 3.5–5.5)
RBC # BLD: 3.06 E12/L (ref 3.5–5.5)
RBC # BLD: 3.14 E12/L (ref 3.5–5.5)
RBC UA: ABNORMAL /HPF (ref 0–2)
RI(T): 2.04
RI(T): 2.15
RI(T): 2.7
RI(T): 4.35
RI(T): 7.77
RI(T): 7.93
RI(T): 8.84
RI(T): 9.62
RR MECHANICAL: 18 B/MIN
RR MECHANICAL: 20 B/MIN
RR MECHANICAL: 20 B/MIN
RR MECHANICAL: 24 B/MIN
SARS-COV-2, NAAT: DETECTED
SCHISTOCYTES: ABNORMAL
SEDIMENTATION RATE, ERYTHROCYTE: 65 MM/HR (ref 0–20)
SMEAR, RESPIRATORY: NORMAL
SMEAR, RESPIRATORY: NORMAL
SODIUM BLD-SCNC: 135 MMOL/L (ref 132–146)
SODIUM BLD-SCNC: 136 MMOL/L (ref 132–146)
SODIUM BLD-SCNC: 137 MMOL/L (ref 132–146)
SODIUM BLD-SCNC: 137 MMOL/L (ref 132–146)
SODIUM BLD-SCNC: 138 MMOL/L (ref 132–146)
SODIUM BLD-SCNC: 139 MMOL/L (ref 132–146)
SODIUM BLD-SCNC: 140 MMOL/L (ref 132–146)
SODIUM BLD-SCNC: 141 MMOL/L (ref 132–146)
SODIUM BLD-SCNC: 142 MMOL/L (ref 132–146)
SODIUM BLD-SCNC: 146 MMOL/L (ref 132–146)
SODIUM BLD-SCNC: ABNORMAL MMOL/L (ref 132–146)
SOURCE, BLOOD GAS: ABNORMAL
SOURCE, BLOOD GAS: NORMAL
SPECIFIC GRAVITY UA: 1.02 (ref 1–1.03)
SPHEROCYTES: ABNORMAL
STREP PNEUMONIAE ANTIGEN, URINE: NORMAL
TARGET CELLS: ABNORMAL
TARGET CELLS: ABNORMAL
TEAR DROP CELLS: ABNORMAL
THB: 10.1 G/DL (ref 11.5–16.5)
THB: 10.1 G/DL (ref 11.5–16.5)
THB: 10.4 G/DL (ref 11.5–16.5)
THB: 11 G/DL (ref 11.5–16.5)
THB: 8.4 G/DL (ref 11.5–16.5)
THB: 8.9 G/DL (ref 11.5–16.5)
THB: 9 G/DL (ref 11.5–16.5)
THB: 9.7 G/DL (ref 11.5–16.5)
TIME ANALYZED: 1619
TIME ANALYZED: 2203
TIME ANALYZED: 2351
TIME ANALYZED: 504
TIME ANALYZED: 505
TIME ANALYZED: 523
TIME ANALYZED: 524
TIME ANALYZED: 739
TOTAL CK: 357 U/L (ref 20–180)
TOTAL PROTEIN: 5 G/DL (ref 6.4–8.3)
TOTAL PROTEIN: 5 G/DL (ref 6.4–8.3)
TOTAL PROTEIN: 5.1 G/DL (ref 6.4–8.3)
TOTAL PROTEIN: 5.1 G/DL (ref 6.4–8.3)
TOTAL PROTEIN: 5.3 G/DL (ref 6.4–8.3)
TOTAL PROTEIN: 5.4 G/DL (ref 6.4–8.3)
TOTAL PROTEIN: 5.5 G/DL (ref 6.4–8.3)
TOTAL PROTEIN: 5.6 G/DL (ref 6.4–8.3)
TOTAL PROTEIN: 5.7 G/DL (ref 6.4–8.3)
TOTAL PROTEIN: 6 G/DL (ref 6.4–8.3)
TOTAL PROTEIN: 6.1 G/DL (ref 6.4–8.3)
TOTAL PROTEIN: 6.2 G/DL (ref 6.4–8.3)
TOTAL PROTEIN: 6.4 G/DL (ref 6.4–8.3)
TOTAL PROTEIN: 6.9 G/DL (ref 6.4–8.3)
TOTAL PROTEIN: 7.9 G/DL (ref 6.4–8.3)
TRIGL SERPL-MCNC: 241 MG/DL (ref 0–149)
TRIGL SERPL-MCNC: 285 MG/DL (ref 0–149)
TRIGL SERPL-MCNC: 500 MG/DL (ref 0–149)
TROPONIN: <0.01 NG/ML (ref 0–0.03)
UROBILINOGEN, URINE: 0.2 E.U./DL
VANCOMYCIN RANDOM: 11.7 MCG/ML (ref 5–40)
VANCOMYCIN RANDOM: 19 MCG/ML (ref 5–40)
VANCOMYCIN RANDOM: 20.1 MCG/ML (ref 5–40)
VANCOMYCIN RANDOM: 25.4 MCG/ML (ref 5–40)
VANCOMYCIN TROUGH: 24.7 MCG/ML (ref 5–16)
VANCOMYCIN TROUGH: 6.5 MCG/ML (ref 5–16)
VITAMIN D 25-HYDROXY: 11 NG/ML (ref 30–100)
VT MECHANICAL: 400 ML
WBC # BLD: 10.6 E9/L (ref 4.5–11.5)
WBC # BLD: 12.3 E9/L (ref 4.5–11.5)
WBC # BLD: 13.4 E9/L (ref 4.5–11.5)
WBC # BLD: 13.5 E9/L (ref 4.5–11.5)
WBC # BLD: 13.6 E9/L (ref 4.5–11.5)
WBC # BLD: 13.7 E9/L (ref 4.5–11.5)
WBC # BLD: 13.8 E9/L (ref 4.5–11.5)
WBC # BLD: 13.9 E9/L (ref 4.5–11.5)
WBC # BLD: 14.4 E9/L (ref 4.5–11.5)
WBC # BLD: 15.7 E9/L (ref 4.5–11.5)
WBC # BLD: 16 E9/L (ref 4.5–11.5)
WBC # BLD: 16.1 E9/L (ref 4.5–11.5)
WBC # BLD: 16.4 E9/L (ref 4.5–11.5)
WBC # BLD: 17 E9/L (ref 4.5–11.5)
WBC # BLD: 17.9 E9/L (ref 4.5–11.5)
WBC # BLD: 19.2 E9/L (ref 4.5–11.5)
WBC # BLD: 20.1 E9/L (ref 4.5–11.5)
WBC # BLD: 20.8 E9/L (ref 4.5–11.5)
WBC # BLD: 9.8 E9/L (ref 4.5–11.5)
WBC UA: ABNORMAL /HPF (ref 0–5)

## 2021-01-01 PROCEDURE — 2500000003 HC RX 250 WO HCPCS: Performed by: INTERNAL MEDICINE

## 2021-01-01 PROCEDURE — 80053 COMPREHEN METABOLIC PANEL: CPT

## 2021-01-01 PROCEDURE — 6360000002 HC RX W HCPCS: Performed by: INTERNAL MEDICINE

## 2021-01-01 PROCEDURE — 2500000003 HC RX 250 WO HCPCS

## 2021-01-01 PROCEDURE — 82805 BLOOD GASES W/O2 SATURATION: CPT

## 2021-01-01 PROCEDURE — 94640 AIRWAY INHALATION TREATMENT: CPT

## 2021-01-01 PROCEDURE — 6370000000 HC RX 637 (ALT 250 FOR IP): Performed by: INTERNAL MEDICINE

## 2021-01-01 PROCEDURE — 2580000003 HC RX 258: Performed by: INTERNAL MEDICINE

## 2021-01-01 PROCEDURE — 81001 URINALYSIS AUTO W/SCOPE: CPT

## 2021-01-01 PROCEDURE — 6360000002 HC RX W HCPCS: Performed by: SPECIALIST

## 2021-01-01 PROCEDURE — 85378 FIBRIN DEGRADE SEMIQUANT: CPT

## 2021-01-01 PROCEDURE — 0BH17EZ INSERTION OF ENDOTRACHEAL AIRWAY INTO TRACHEA, VIA NATURAL OR ARTIFICIAL OPENING: ICD-10-PCS | Performed by: INTERNAL MEDICINE

## 2021-01-01 PROCEDURE — 2580000003 HC RX 258: Performed by: EMERGENCY MEDICINE

## 2021-01-01 PROCEDURE — 93005 ELECTROCARDIOGRAM TRACING: CPT | Performed by: INTERNAL MEDICINE

## 2021-01-01 PROCEDURE — 84478 ASSAY OF TRIGLYCERIDES: CPT

## 2021-01-01 PROCEDURE — 36592 COLLECT BLOOD FROM PICC: CPT

## 2021-01-01 PROCEDURE — 86140 C-REACTIVE PROTEIN: CPT

## 2021-01-01 PROCEDURE — 6360000002 HC RX W HCPCS

## 2021-01-01 PROCEDURE — C9113 INJ PANTOPRAZOLE SODIUM, VIA: HCPCS | Performed by: INTERNAL MEDICINE

## 2021-01-01 PROCEDURE — 82962 GLUCOSE BLOOD TEST: CPT

## 2021-01-01 PROCEDURE — 6370000000 HC RX 637 (ALT 250 FOR IP): Performed by: SPECIALIST

## 2021-01-01 PROCEDURE — C1751 CATH, INF, PER/CENT/MIDLINE: HCPCS

## 2021-01-01 PROCEDURE — 94003 VENT MGMT INPAT SUBQ DAY: CPT

## 2021-01-01 PROCEDURE — 82803 BLOOD GASES ANY COMBINATION: CPT

## 2021-01-01 PROCEDURE — 86901 BLOOD TYPING SEROLOGIC RH(D): CPT

## 2021-01-01 PROCEDURE — 82728 ASSAY OF FERRITIN: CPT

## 2021-01-01 PROCEDURE — 74018 RADEX ABDOMEN 1 VIEW: CPT

## 2021-01-01 PROCEDURE — 80202 ASSAY OF VANCOMYCIN: CPT

## 2021-01-01 PROCEDURE — 2000000000 HC ICU R&B

## 2021-01-01 PROCEDURE — XW033H5 INTRODUCTION OF TOCILIZUMAB INTO PERIPHERAL VEIN, PERCUTANEOUS APPROACH, NEW TECHNOLOGY GROUP 5: ICD-10-PCS | Performed by: INTERNAL MEDICINE

## 2021-01-01 PROCEDURE — 84100 ASSAY OF PHOSPHORUS: CPT

## 2021-01-01 PROCEDURE — 83615 LACTATE (LD) (LDH) ENZYME: CPT

## 2021-01-01 PROCEDURE — 2580000003 HC RX 258: Performed by: SPECIALIST

## 2021-01-01 PROCEDURE — 84145 PROCALCITONIN (PCT): CPT

## 2021-01-01 PROCEDURE — 80048 BASIC METABOLIC PNL TOTAL CA: CPT

## 2021-01-01 PROCEDURE — 6360000004 HC RX CONTRAST MEDICATION: Performed by: RADIOLOGY

## 2021-01-01 PROCEDURE — 85025 COMPLETE CBC W/AUTO DIFF WBC: CPT

## 2021-01-01 PROCEDURE — 83735 ASSAY OF MAGNESIUM: CPT

## 2021-01-01 PROCEDURE — 86850 RBC ANTIBODY SCREEN: CPT

## 2021-01-01 PROCEDURE — 6370000000 HC RX 637 (ALT 250 FOR IP): Performed by: EMERGENCY MEDICINE

## 2021-01-01 PROCEDURE — 85384 FIBRINOGEN ACTIVITY: CPT

## 2021-01-01 PROCEDURE — 87206 SMEAR FLUORESCENT/ACID STAI: CPT

## 2021-01-01 PROCEDURE — 71045 X-RAY EXAM CHEST 1 VIEW: CPT

## 2021-01-01 PROCEDURE — 85730 THROMBOPLASTIN TIME PARTIAL: CPT

## 2021-01-01 PROCEDURE — 36569 INSJ PICC 5 YR+ W/O IMAGING: CPT

## 2021-01-01 PROCEDURE — 31500 INSERT EMERGENCY AIRWAY: CPT

## 2021-01-01 PROCEDURE — 5A1955Z RESPIRATORY VENTILATION, GREATER THAN 96 CONSECUTIVE HOURS: ICD-10-PCS | Performed by: INTERNAL MEDICINE

## 2021-01-01 PROCEDURE — 86900 BLOOD TYPING SEROLOGIC ABO: CPT

## 2021-01-01 PROCEDURE — 83605 ASSAY OF LACTIC ACID: CPT

## 2021-01-01 PROCEDURE — 94660 CPAP INITIATION&MGMT: CPT

## 2021-01-01 PROCEDURE — 93005 ELECTROCARDIOGRAM TRACING: CPT | Performed by: EMERGENCY MEDICINE

## 2021-01-01 PROCEDURE — XW13325 TRANSFUSION OF CONVALESCENT PLASMA (NONAUTOLOGOUS) INTO PERIPHERAL VEIN, PERCUTANEOUS APPROACH, NEW TECHNOLOGY GROUP 5: ICD-10-PCS | Performed by: INTERNAL MEDICINE

## 2021-01-01 PROCEDURE — 2500000003 HC RX 250 WO HCPCS: Performed by: EMERGENCY MEDICINE

## 2021-01-01 PROCEDURE — 36415 COLL VENOUS BLD VENIPUNCTURE: CPT

## 2021-01-01 PROCEDURE — 36600 WITHDRAWAL OF ARTERIAL BLOOD: CPT

## 2021-01-01 PROCEDURE — 82306 VITAMIN D 25 HYDROXY: CPT

## 2021-01-01 PROCEDURE — 87449 NOS EACH ORGANISM AG IA: CPT

## 2021-01-01 PROCEDURE — 87070 CULTURE OTHR SPECIMN AEROBIC: CPT

## 2021-01-01 PROCEDURE — 87040 BLOOD CULTURE FOR BACTERIA: CPT

## 2021-01-01 PROCEDURE — 71275 CT ANGIOGRAPHY CHEST: CPT

## 2021-01-01 PROCEDURE — 85651 RBC SED RATE NONAUTOMATED: CPT

## 2021-01-01 PROCEDURE — 2720000010 HC SURG SUPPLY STERILE

## 2021-01-01 PROCEDURE — 99285 EMERGENCY DEPT VISIT HI MDM: CPT

## 2021-01-01 PROCEDURE — 83690 ASSAY OF LIPASE: CPT

## 2021-01-01 PROCEDURE — 84484 ASSAY OF TROPONIN QUANT: CPT

## 2021-01-01 PROCEDURE — 96365 THER/PROPH/DIAG IV INF INIT: CPT

## 2021-01-01 PROCEDURE — 36430 TRANSFUSION BLD/BLD COMPNT: CPT

## 2021-01-01 PROCEDURE — 76937 US GUIDE VASCULAR ACCESS: CPT

## 2021-01-01 PROCEDURE — 6360000002 HC RX W HCPCS: Performed by: EMERGENCY MEDICINE

## 2021-01-01 PROCEDURE — 02HV33Z INSERTION OF INFUSION DEVICE INTO SUPERIOR VENA CAVA, PERCUTANEOUS APPROACH: ICD-10-PCS | Performed by: INTERNAL MEDICINE

## 2021-01-01 PROCEDURE — 96375 TX/PRO/DX INJ NEW DRUG ADDON: CPT

## 2021-01-01 PROCEDURE — 31500 INSERT EMERGENCY AIRWAY: CPT | Performed by: INTERNAL MEDICINE

## 2021-01-01 PROCEDURE — 86060 ANTISTREPTOLYSIN O TITER: CPT

## 2021-01-01 PROCEDURE — 93970 EXTREMITY STUDY: CPT

## 2021-01-01 PROCEDURE — 94002 VENT MGMT INPAT INIT DAY: CPT

## 2021-01-01 PROCEDURE — XW033E5 INTRODUCTION OF REMDESIVIR ANTI-INFECTIVE INTO PERIPHERAL VEIN, PERCUTANEOUS APPROACH, NEW TECHNOLOGY GROUP 5: ICD-10-PCS | Performed by: INTERNAL MEDICINE

## 2021-01-01 PROCEDURE — 82010 KETONE BODYS QUAN: CPT

## 2021-01-01 PROCEDURE — 99291 CRITICAL CARE FIRST HOUR: CPT | Performed by: INTERNAL MEDICINE

## 2021-01-01 PROCEDURE — 87635 SARS-COV-2 COVID-19 AMP PRB: CPT

## 2021-01-01 PROCEDURE — 82550 ASSAY OF CK (CPK): CPT

## 2021-01-01 PROCEDURE — 5A1935Z RESPIRATORY VENTILATION, LESS THAN 24 CONSECUTIVE HOURS: ICD-10-PCS | Performed by: INTERNAL MEDICINE

## 2021-01-01 PROCEDURE — 85610 PROTHROMBIN TIME: CPT

## 2021-01-01 RX ORDER — SODIUM CHLORIDE 0.9 % (FLUSH) 0.9 %
5-40 SYRINGE (ML) INJECTION PRN
Status: DISCONTINUED | OUTPATIENT
Start: 2021-01-01 | End: 2021-01-01

## 2021-01-01 RX ORDER — PANTOPRAZOLE SODIUM 40 MG/10ML
40 INJECTION, POWDER, LYOPHILIZED, FOR SOLUTION INTRAVENOUS 2 TIMES DAILY
Status: DISCONTINUED | OUTPATIENT
Start: 2021-01-01 | End: 2021-01-01

## 2021-01-01 RX ORDER — FENTANYL CITRATE 50 UG/ML
100 INJECTION, SOLUTION INTRAMUSCULAR; INTRAVENOUS ONCE
Status: COMPLETED | OUTPATIENT
Start: 2021-01-01 | End: 2021-01-01

## 2021-01-01 RX ORDER — INSULIN GLARGINE 100 [IU]/ML
20 INJECTION, SOLUTION SUBCUTANEOUS 2 TIMES DAILY
Status: DISCONTINUED | OUTPATIENT
Start: 2021-01-01 | End: 2021-01-01

## 2021-01-01 RX ORDER — 0.9 % SODIUM CHLORIDE 0.9 %
30 INTRAVENOUS SOLUTION INTRAVENOUS PRN
Status: DISCONTINUED | OUTPATIENT
Start: 2021-01-01 | End: 2021-01-01 | Stop reason: ALTCHOICE

## 2021-01-01 RX ORDER — HEPARIN SODIUM (PORCINE) LOCK FLUSH IV SOLN 100 UNIT/ML 100 UNIT/ML
3 SOLUTION INTRAVENOUS PRN
Status: DISCONTINUED | OUTPATIENT
Start: 2021-01-01 | End: 2021-01-01

## 2021-01-01 RX ORDER — INSULIN GLARGINE 100 [IU]/ML
8 INJECTION, SOLUTION SUBCUTANEOUS ONCE
Status: COMPLETED | OUTPATIENT
Start: 2021-01-01 | End: 2021-01-01

## 2021-01-01 RX ORDER — SODIUM CHLORIDE, SODIUM LACTATE, POTASSIUM CHLORIDE, AND CALCIUM CHLORIDE .6; .31; .03; .02 G/100ML; G/100ML; G/100ML; G/100ML
500 INJECTION, SOLUTION INTRAVENOUS ONCE
Status: COMPLETED | OUTPATIENT
Start: 2021-01-01 | End: 2021-01-01

## 2021-01-01 RX ORDER — FENTANYL CITRATE 50 UG/ML
25 INJECTION, SOLUTION INTRAMUSCULAR; INTRAVENOUS
Status: DISCONTINUED | OUTPATIENT
Start: 2021-01-01 | End: 2021-04-27 | Stop reason: HOSPADM

## 2021-01-01 RX ORDER — MAGNESIUM SULFATE 1 G/100ML
1000 INJECTION INTRAVENOUS PRN
Status: DISCONTINUED | OUTPATIENT
Start: 2021-01-01 | End: 2021-01-01

## 2021-01-01 RX ORDER — IPRATROPIUM BROMIDE AND ALBUTEROL SULFATE 2.5; .5 MG/3ML; MG/3ML
3 SOLUTION RESPIRATORY (INHALATION) ONCE
Status: COMPLETED | OUTPATIENT
Start: 2021-01-01 | End: 2021-01-01

## 2021-01-01 RX ORDER — HYDRALAZINE HYDROCHLORIDE 20 MG/ML
INJECTION INTRAMUSCULAR; INTRAVENOUS
Status: DISPENSED
Start: 2021-01-01 | End: 2021-01-01

## 2021-01-01 RX ORDER — FENTANYL CITRATE 50 UG/ML
25 INJECTION, SOLUTION INTRAMUSCULAR; INTRAVENOUS ONCE
Status: DISCONTINUED | OUTPATIENT
Start: 2021-01-01 | End: 2021-01-01

## 2021-01-01 RX ORDER — SUCCINYLCHOLINE/SOD CL,ISO/PF 200MG/10ML
SYRINGE (ML) INTRAVENOUS
Status: DISPENSED
Start: 2021-01-01 | End: 2021-01-01

## 2021-01-01 RX ORDER — MIDAZOLAM HYDROCHLORIDE 5 MG/ML
INJECTION INTRAMUSCULAR; INTRAVENOUS
Status: COMPLETED
Start: 2021-01-01 | End: 2021-01-01

## 2021-01-01 RX ORDER — LIDOCAINE HYDROCHLORIDE 10 MG/ML
5 INJECTION, SOLUTION EPIDURAL; INFILTRATION; INTRACAUDAL; PERINEURAL ONCE
Status: DISCONTINUED | OUTPATIENT
Start: 2021-01-01 | End: 2021-01-01

## 2021-01-01 RX ORDER — GLYCOPYRROLATE 0.2 MG/ML
INJECTION INTRAMUSCULAR; INTRAVENOUS
Status: DISCONTINUED
Start: 2021-01-01 | End: 2021-04-27 | Stop reason: HOSPADM

## 2021-01-01 RX ORDER — PANTOPRAZOLE SODIUM 40 MG/1
40 TABLET, DELAYED RELEASE ORAL
COMMUNITY

## 2021-01-01 RX ORDER — DEXTROSE MONOHYDRATE 50 MG/ML
100 INJECTION, SOLUTION INTRAVENOUS PRN
Status: DISCONTINUED | OUTPATIENT
Start: 2021-01-01 | End: 2021-01-01

## 2021-01-01 RX ORDER — SODIUM CHLORIDE 9 MG/ML
10 INJECTION INTRAVENOUS DAILY
Status: DISCONTINUED | OUTPATIENT
Start: 2021-01-01 | End: 2021-01-01

## 2021-01-01 RX ORDER — INSULIN GLARGINE 100 [IU]/ML
40 INJECTION, SOLUTION SUBCUTANEOUS 2 TIMES DAILY
Status: DISCONTINUED | OUTPATIENT
Start: 2021-01-01 | End: 2021-01-01

## 2021-01-01 RX ORDER — SODIUM CHLORIDE 9 MG/ML
INJECTION, SOLUTION INTRAVENOUS CONTINUOUS
Status: DISCONTINUED | OUTPATIENT
Start: 2021-01-01 | End: 2021-01-01

## 2021-01-01 RX ORDER — ONDANSETRON 2 MG/ML
4 INJECTION INTRAMUSCULAR; INTRAVENOUS EVERY 6 HOURS PRN
Status: DISCONTINUED | OUTPATIENT
Start: 2021-01-01 | End: 2021-01-01

## 2021-01-01 RX ORDER — SODIUM CHLORIDE 0.9 % (FLUSH) 0.9 %
5-40 SYRINGE (ML) INJECTION EVERY 12 HOURS SCHEDULED
Status: DISCONTINUED | OUTPATIENT
Start: 2021-01-01 | End: 2021-01-01

## 2021-01-01 RX ORDER — DEXTROSE AND SODIUM CHLORIDE 5; .45 G/100ML; G/100ML
INJECTION, SOLUTION INTRAVENOUS CONTINUOUS PRN
Status: DISCONTINUED | OUTPATIENT
Start: 2021-01-01 | End: 2021-01-01

## 2021-01-01 RX ORDER — HALOPERIDOL 5 MG/ML
2 INJECTION INTRAMUSCULAR EVERY 4 HOURS PRN
Status: DISCONTINUED | OUTPATIENT
Start: 2021-01-01 | End: 2021-01-01

## 2021-01-01 RX ORDER — ALLOPURINOL 100 MG/1
100 TABLET ORAL DAILY
COMMUNITY

## 2021-01-01 RX ORDER — MIDAZOLAM HYDROCHLORIDE 1 MG/ML
5 INJECTION INTRAMUSCULAR; INTRAVENOUS ONCE
Status: DISCONTINUED | OUTPATIENT
Start: 2021-01-01 | End: 2021-01-01

## 2021-01-01 RX ORDER — FENTANYL CITRATE 50 UG/ML
50 INJECTION, SOLUTION INTRAMUSCULAR; INTRAVENOUS ONCE
Status: COMPLETED | OUTPATIENT
Start: 2021-01-01 | End: 2021-01-01

## 2021-01-01 RX ORDER — DEXTROSE AND SODIUM CHLORIDE 5; .45 G/100ML; G/100ML
INJECTION, SOLUTION INTRAVENOUS CONTINUOUS
Status: DISCONTINUED | OUTPATIENT
Start: 2021-01-01 | End: 2021-01-01

## 2021-01-01 RX ORDER — GABAPENTIN 300 MG/1
300 CAPSULE ORAL 3 TIMES DAILY
COMMUNITY

## 2021-01-01 RX ORDER — FENTANYL CITRATE 50 UG/ML
INJECTION, SOLUTION INTRAMUSCULAR; INTRAVENOUS
Status: COMPLETED
Start: 2021-01-01 | End: 2021-01-01

## 2021-01-01 RX ORDER — FENTANYL CITRATE 50 UG/ML
INJECTION, SOLUTION INTRAMUSCULAR; INTRAVENOUS
Status: DISPENSED
Start: 2021-01-01 | End: 2021-01-01

## 2021-01-01 RX ORDER — IVERMECTIN 3 MG/1
200 TABLET ORAL DAILY
Status: DISPENSED | OUTPATIENT
Start: 2021-01-01 | End: 2021-01-01

## 2021-01-01 RX ORDER — DEXAMETHASONE SODIUM PHOSPHATE 10 MG/ML
10 INJECTION, SOLUTION INTRAMUSCULAR; INTRAVENOUS ONCE
Status: COMPLETED | OUTPATIENT
Start: 2021-01-01 | End: 2021-01-01

## 2021-01-01 RX ORDER — PANTOPRAZOLE SODIUM 40 MG/10ML
40 INJECTION, POWDER, LYOPHILIZED, FOR SOLUTION INTRAVENOUS DAILY
Status: DISCONTINUED | OUTPATIENT
Start: 2021-01-01 | End: 2021-01-01

## 2021-01-01 RX ORDER — INSULIN GLARGINE 100 [IU]/ML
32 INJECTION, SOLUTION SUBCUTANEOUS 2 TIMES DAILY
Status: DISCONTINUED | OUTPATIENT
Start: 2021-01-01 | End: 2021-01-01

## 2021-01-01 RX ORDER — HALOPERIDOL 5 MG/ML
2 INJECTION INTRAMUSCULAR ONCE
Status: DISCONTINUED | OUTPATIENT
Start: 2021-01-01 | End: 2021-01-01

## 2021-01-01 RX ORDER — SUCCINYLCHOLINE CHLORIDE 20 MG/ML
140 INJECTION INTRAMUSCULAR; INTRAVENOUS ONCE
Status: DISCONTINUED | OUTPATIENT
Start: 2021-01-01 | End: 2021-01-01

## 2021-01-01 RX ORDER — BUPROPION HYDROCHLORIDE 100 MG/1
100 TABLET ORAL 2 TIMES DAILY
Status: DISCONTINUED | OUTPATIENT
Start: 2021-01-01 | End: 2021-01-01

## 2021-01-01 RX ORDER — PROPOFOL 10 MG/ML
5-50 INJECTION, EMULSION INTRAVENOUS
Status: DISCONTINUED | OUTPATIENT
Start: 2021-01-01 | End: 2021-01-01 | Stop reason: CLARIF

## 2021-01-01 RX ORDER — HEPARIN SODIUM (PORCINE) LOCK FLUSH IV SOLN 100 UNIT/ML 100 UNIT/ML
3 SOLUTION INTRAVENOUS EVERY 12 HOURS SCHEDULED
Status: DISCONTINUED | OUTPATIENT
Start: 2021-01-01 | End: 2021-01-01

## 2021-01-01 RX ORDER — FENTANYL CITRATE 50 UG/ML
25 INJECTION, SOLUTION INTRAMUSCULAR; INTRAVENOUS
Status: DISCONTINUED | OUTPATIENT
Start: 2021-01-01 | End: 2021-01-01

## 2021-01-01 RX ORDER — BUPROPION HYDROCHLORIDE 100 MG/1
100 TABLET, EXTENDED RELEASE ORAL 2 TIMES DAILY
COMMUNITY

## 2021-01-01 RX ORDER — HYDRALAZINE HYDROCHLORIDE 20 MG/ML
10 INJECTION INTRAMUSCULAR; INTRAVENOUS EVERY 6 HOURS PRN
Status: DISCONTINUED | OUTPATIENT
Start: 2021-01-01 | End: 2021-01-01

## 2021-01-01 RX ORDER — INSULIN GLARGINE 100 [IU]/ML
35 INJECTION, SOLUTION SUBCUTANEOUS 2 TIMES DAILY
Status: DISCONTINUED | OUTPATIENT
Start: 2021-01-01 | End: 2021-01-01

## 2021-01-01 RX ORDER — SUCCINYLCHOLINE/SOD CL,ISO/PF 200MG/10ML
SYRINGE (ML) INTRAVENOUS
Status: COMPLETED
Start: 2021-01-01 | End: 2021-01-01

## 2021-01-01 RX ORDER — FENTANYL CITRATE 50 UG/ML
INJECTION, SOLUTION INTRAMUSCULAR; INTRAVENOUS
Status: DISCONTINUED
Start: 2021-01-01 | End: 2021-01-01 | Stop reason: WASHOUT

## 2021-01-01 RX ORDER — POTASSIUM CHLORIDE 29.8 MG/ML
20 INJECTION INTRAVENOUS
Status: COMPLETED | OUTPATIENT
Start: 2021-01-01 | End: 2021-01-01

## 2021-01-01 RX ORDER — MAGNESIUM SULFATE IN WATER 40 MG/ML
2000 INJECTION, SOLUTION INTRAVENOUS ONCE
Status: COMPLETED | OUTPATIENT
Start: 2021-01-01 | End: 2021-01-01

## 2021-01-01 RX ORDER — 0.9 % SODIUM CHLORIDE 0.9 %
15 INTRAVENOUS SOLUTION INTRAVENOUS ONCE
Status: DISCONTINUED | OUTPATIENT
Start: 2021-01-01 | End: 2021-01-01

## 2021-01-01 RX ORDER — GLYCOPYRROLATE 0.2 MG/ML
0.4 INJECTION INTRAMUSCULAR; INTRAVENOUS
Status: DISCONTINUED | OUTPATIENT
Start: 2021-01-01 | End: 2021-04-27 | Stop reason: HOSPADM

## 2021-01-01 RX ORDER — FUROSEMIDE 10 MG/ML
40 INJECTION INTRAMUSCULAR; INTRAVENOUS ONCE
Status: COMPLETED | OUTPATIENT
Start: 2021-01-01 | End: 2021-01-01

## 2021-01-01 RX ORDER — SODIUM CHLORIDE 0.9 % (FLUSH) 0.9 %
5-40 SYRINGE (ML) INJECTION 2 TIMES DAILY
Status: DISCONTINUED | OUTPATIENT
Start: 2021-01-01 | End: 2021-01-01

## 2021-01-01 RX ORDER — IVERMECTIN 3 MG/1
200 TABLET ORAL DAILY
Status: COMPLETED | OUTPATIENT
Start: 2021-01-01 | End: 2021-01-01

## 2021-01-01 RX ORDER — DEXMEDETOMIDINE HYDROCHLORIDE 4 UG/ML
.2-1.4 INJECTION, SOLUTION INTRAVENOUS CONTINUOUS
Status: DISCONTINUED | OUTPATIENT
Start: 2021-01-01 | End: 2021-01-01

## 2021-01-01 RX ORDER — HALOPERIDOL 5 MG/ML
2 INJECTION INTRAMUSCULAR ONCE
Status: COMPLETED | OUTPATIENT
Start: 2021-01-01 | End: 2021-01-01

## 2021-01-01 RX ORDER — INSULIN GLARGINE 100 [IU]/ML
30 INJECTION, SOLUTION SUBCUTANEOUS 2 TIMES DAILY
Status: DISCONTINUED | OUTPATIENT
Start: 2021-01-01 | End: 2021-01-01

## 2021-01-01 RX ORDER — DEXMEDETOMIDINE HYDROCHLORIDE 4 UG/ML
.2-1.4 INJECTION, SOLUTION INTRAVENOUS CONTINUOUS
Status: DISCONTINUED | OUTPATIENT
Start: 2021-01-01 | End: 2021-04-27 | Stop reason: HOSPADM

## 2021-01-01 RX ORDER — DIPHENHYDRAMINE HYDROCHLORIDE 50 MG/ML
25 INJECTION INTRAMUSCULAR; INTRAVENOUS ONCE
Status: COMPLETED | OUTPATIENT
Start: 2021-01-01 | End: 2021-01-01

## 2021-01-01 RX ORDER — ACETAMINOPHEN 325 MG/1
650 TABLET ORAL EVERY 6 HOURS PRN
Status: DISCONTINUED | OUTPATIENT
Start: 2021-01-01 | End: 2021-04-27 | Stop reason: HOSPADM

## 2021-01-01 RX ORDER — DEXAMETHASONE SODIUM PHOSPHATE 10 MG/ML
6 INJECTION, SOLUTION INTRAMUSCULAR; INTRAVENOUS DAILY
Status: DISCONTINUED | OUTPATIENT
Start: 2021-01-01 | End: 2021-01-01

## 2021-01-01 RX ORDER — LEVOTHYROXINE SODIUM 175 UG/1
175 TABLET ORAL DAILY
COMMUNITY

## 2021-01-01 RX ORDER — ZINC SULFATE 50(220)MG
50 CAPSULE ORAL DAILY
Status: DISCONTINUED | OUTPATIENT
Start: 2021-01-01 | End: 2021-01-01

## 2021-01-01 RX ORDER — DEXTROSE MONOHYDRATE 25 G/50ML
12.5 INJECTION, SOLUTION INTRAVENOUS PRN
Status: DISCONTINUED | OUTPATIENT
Start: 2021-01-01 | End: 2021-01-01

## 2021-01-01 RX ORDER — SODIUM CHLORIDE, SODIUM LACTATE, POTASSIUM CHLORIDE, CALCIUM CHLORIDE 600; 310; 30; 20 MG/100ML; MG/100ML; MG/100ML; MG/100ML
INJECTION, SOLUTION INTRAVENOUS CONTINUOUS
Status: DISCONTINUED | OUTPATIENT
Start: 2021-01-01 | End: 2021-01-01

## 2021-01-01 RX ORDER — POTASSIUM CHLORIDE 7.45 MG/ML
10 INJECTION INTRAVENOUS PRN
Status: DISCONTINUED | OUTPATIENT
Start: 2021-01-01 | End: 2021-01-01

## 2021-01-01 RX ORDER — LORAZEPAM 2 MG/ML
2 INJECTION INTRAMUSCULAR ONCE
Status: COMPLETED | OUTPATIENT
Start: 2021-01-01 | End: 2021-01-01

## 2021-01-01 RX ORDER — SODIUM CHLORIDE 9 MG/ML
10 INJECTION INTRAVENOUS 2 TIMES DAILY
Status: DISCONTINUED | OUTPATIENT
Start: 2021-01-01 | End: 2021-01-01

## 2021-01-01 RX ORDER — GABAPENTIN 250 MG/5ML
100 SOLUTION ORAL DAILY
Status: DISCONTINUED | OUTPATIENT
Start: 2021-01-01 | End: 2021-01-01

## 2021-01-01 RX ORDER — ETOMIDATE 2 MG/ML
0.3 INJECTION INTRAVENOUS ONCE
Status: COMPLETED | OUTPATIENT
Start: 2021-01-01 | End: 2021-01-01

## 2021-01-01 RX ORDER — FUROSEMIDE 10 MG/ML
20 INJECTION INTRAMUSCULAR; INTRAVENOUS DAILY
Status: DISCONTINUED | OUTPATIENT
Start: 2021-01-01 | End: 2021-01-01

## 2021-01-01 RX ORDER — IPRATROPIUM BROMIDE AND ALBUTEROL SULFATE 2.5; .5 MG/3ML; MG/3ML
1 SOLUTION RESPIRATORY (INHALATION) EVERY 4 HOURS
Status: DISCONTINUED | OUTPATIENT
Start: 2021-01-01 | End: 2021-01-01

## 2021-01-01 RX ORDER — ACETAMINOPHEN 650 MG/1
650 SUPPOSITORY RECTAL EVERY 6 HOURS PRN
Status: DISCONTINUED | OUTPATIENT
Start: 2021-01-01 | End: 2021-04-27 | Stop reason: HOSPADM

## 2021-01-01 RX ORDER — POLYVINYL ALCOHOL 14 MG/ML
2 SOLUTION/ DROPS OPHTHALMIC PRN
Status: DISCONTINUED | OUTPATIENT
Start: 2021-01-01 | End: 2021-04-27 | Stop reason: HOSPADM

## 2021-01-01 RX ORDER — VITAMIN B COMPLEX
5000 TABLET ORAL DAILY
Status: DISCONTINUED | OUTPATIENT
Start: 2021-01-01 | End: 2021-01-01

## 2021-01-01 RX ORDER — FLUCONAZOLE 2 MG/ML
400 INJECTION, SOLUTION INTRAVENOUS EVERY 24 HOURS
Status: DISCONTINUED | OUTPATIENT
Start: 2021-01-01 | End: 2021-01-01

## 2021-01-01 RX ORDER — SODIUM CHLORIDE, SODIUM LACTATE, POTASSIUM CHLORIDE, CALCIUM CHLORIDE 600; 310; 30; 20 MG/100ML; MG/100ML; MG/100ML; MG/100ML
INJECTION, SOLUTION INTRAVENOUS CONTINUOUS
Status: DISCONTINUED | OUTPATIENT
Start: 2021-01-01 | End: 2021-01-01 | Stop reason: SDUPTHER

## 2021-01-01 RX ORDER — PROPOFOL 10 MG/ML
5-50 INJECTION, EMULSION INTRAVENOUS
Status: DISCONTINUED | OUTPATIENT
Start: 2021-01-01 | End: 2021-01-01

## 2021-01-01 RX ORDER — DEXAMETHASONE SODIUM PHOSPHATE 10 MG/ML
6 INJECTION, SOLUTION INTRAMUSCULAR; INTRAVENOUS EVERY 24 HOURS
Status: DISCONTINUED | OUTPATIENT
Start: 2021-01-01 | End: 2021-01-01

## 2021-01-01 RX ORDER — ETOMIDATE 2 MG/ML
INJECTION INTRAVENOUS
Status: DISPENSED
Start: 2021-01-01 | End: 2021-01-01

## 2021-01-01 RX ORDER — INSULIN DETEMIR 100 [IU]/ML
54 INJECTION, SOLUTION SUBCUTANEOUS NIGHTLY
COMMUNITY

## 2021-01-01 RX ORDER — EPINEPHRINE 1 MG/ML
0.3 INJECTION, SOLUTION, CONCENTRATE INTRAVENOUS
Status: ACTIVE | OUTPATIENT
Start: 2021-01-01 | End: 2021-01-01

## 2021-01-01 RX ORDER — MIDODRINE HYDROCHLORIDE 5 MG/1
10 TABLET ORAL EVERY 8 HOURS
Status: DISCONTINUED | OUTPATIENT
Start: 2021-01-01 | End: 2021-01-01

## 2021-01-01 RX ORDER — ACETAMINOPHEN 325 MG/1
650 TABLET ORAL ONCE
Status: COMPLETED | OUTPATIENT
Start: 2021-01-01 | End: 2021-01-01

## 2021-01-01 RX ORDER — SODIUM CHLORIDE 9 MG/ML
INJECTION, SOLUTION INTRAVENOUS PRN
Status: DISCONTINUED | OUTPATIENT
Start: 2021-01-01 | End: 2021-01-01

## 2021-01-01 RX ORDER — SODIUM CHLORIDE 9 MG/ML
25 INJECTION, SOLUTION INTRAVENOUS PRN
Status: DISCONTINUED | OUTPATIENT
Start: 2021-01-01 | End: 2021-01-01

## 2021-01-01 RX ORDER — LINEZOLID 2 MG/ML
600 INJECTION, SOLUTION INTRAVENOUS EVERY 12 HOURS
Status: DISCONTINUED | OUTPATIENT
Start: 2021-01-01 | End: 2021-01-01

## 2021-01-01 RX ORDER — ACETAMINOPHEN 500 MG
1500 TABLET ORAL EVERY 6 HOURS PRN
COMMUNITY

## 2021-01-01 RX ORDER — MAGNESIUM SULFATE IN WATER 40 MG/ML
2000 INJECTION, SOLUTION INTRAVENOUS ONCE
Status: DISCONTINUED | OUTPATIENT
Start: 2021-01-01 | End: 2021-01-01

## 2021-01-01 RX ORDER — INSULIN GLARGINE 100 [IU]/ML
15 INJECTION, SOLUTION SUBCUTANEOUS 2 TIMES DAILY
Status: DISCONTINUED | OUTPATIENT
Start: 2021-01-01 | End: 2021-01-01

## 2021-01-01 RX ORDER — SODIUM CHLORIDE, SODIUM LACTATE, POTASSIUM CHLORIDE, AND CALCIUM CHLORIDE .6; .31; .03; .02 G/100ML; G/100ML; G/100ML; G/100ML
250 INJECTION, SOLUTION INTRAVENOUS ONCE
Status: COMPLETED | OUTPATIENT
Start: 2021-01-01 | End: 2021-01-01

## 2021-01-01 RX ORDER — INSULIN GLARGINE 100 [IU]/ML
5 INJECTION, SOLUTION SUBCUTANEOUS NIGHTLY
Status: DISCONTINUED | OUTPATIENT
Start: 2021-01-01 | End: 2021-01-01

## 2021-01-01 RX ORDER — METHYLPREDNISOLONE SODIUM SUCCINATE 125 MG/2ML
125 INJECTION, POWDER, LYOPHILIZED, FOR SOLUTION INTRAMUSCULAR; INTRAVENOUS
Status: ACTIVE | OUTPATIENT
Start: 2021-01-01 | End: 2021-01-01

## 2021-01-01 RX ORDER — NICOTINE POLACRILEX 4 MG
15 LOZENGE BUCCAL PRN
Status: DISCONTINUED | OUTPATIENT
Start: 2021-01-01 | End: 2021-01-01

## 2021-01-01 RX ORDER — MECLIZINE HYDROCHLORIDE 25 MG/1
25 TABLET ORAL 3 TIMES DAILY PRN
COMMUNITY

## 2021-01-01 RX ORDER — FUROSEMIDE 10 MG/ML
20 INJECTION INTRAMUSCULAR; INTRAVENOUS ONCE
Status: COMPLETED | OUTPATIENT
Start: 2021-01-01 | End: 2021-01-01

## 2021-01-01 RX ORDER — 0.9 % SODIUM CHLORIDE 0.9 %
1000 INTRAVENOUS SOLUTION INTRAVENOUS ONCE
Status: COMPLETED | OUTPATIENT
Start: 2021-01-01 | End: 2021-01-01

## 2021-01-01 RX ORDER — DEXAMETHASONE SODIUM PHOSPHATE 10 MG/ML
6 INJECTION, SOLUTION INTRAMUSCULAR; INTRAVENOUS EVERY 8 HOURS
Status: DISCONTINUED | OUTPATIENT
Start: 2021-01-01 | End: 2021-01-01

## 2021-01-01 RX ORDER — INSULIN GLARGINE 100 [IU]/ML
10 INJECTION, SOLUTION SUBCUTANEOUS 2 TIMES DAILY
Status: DISCONTINUED | OUTPATIENT
Start: 2021-01-01 | End: 2021-01-01

## 2021-01-01 RX ORDER — PROPOFOL 10 MG/ML
INJECTION, EMULSION INTRAVENOUS
Status: COMPLETED
Start: 2021-01-01 | End: 2021-01-01

## 2021-01-01 RX ORDER — VITAMIN B COMPLEX
1000 TABLET ORAL DAILY
Status: DISCONTINUED | OUTPATIENT
Start: 2021-01-01 | End: 2021-01-01

## 2021-01-01 RX ORDER — CARVEDILOL 25 MG/1
12.5 TABLET ORAL 2 TIMES DAILY
COMMUNITY

## 2021-01-01 RX ORDER — SUCCINYLCHOLINE/SOD CL,ISO/PF 200MG/10ML
100 SYRINGE (ML) INTRAVENOUS ONCE
Status: COMPLETED | OUTPATIENT
Start: 2021-01-01 | End: 2021-01-01

## 2021-01-01 RX ADMIN — ASCORBIC ACID 1500 MG: 500 INJECTION INTRAVENOUS at 13:18

## 2021-01-01 RX ADMIN — DEXAMETHASONE SODIUM PHOSPHATE 6 MG: 10 INJECTION, SOLUTION INTRAMUSCULAR; INTRAVENOUS at 06:17

## 2021-01-01 RX ADMIN — Medication 10 ML: at 08:53

## 2021-01-01 RX ADMIN — POTASSIUM CHLORIDE 20 MEQ: 29.8 INJECTION, SOLUTION INTRAVENOUS at 14:24

## 2021-01-01 RX ADMIN — CEFEPIME HYDROCHLORIDE 2000 MG: 2 INJECTION, POWDER, FOR SOLUTION INTRAVENOUS at 08:22

## 2021-01-01 RX ADMIN — PROPOFOL 35 MCG/KG/MIN: 10 INJECTION, EMULSION INTRAVENOUS at 11:08

## 2021-01-01 RX ADMIN — Medication 10 ML: at 08:41

## 2021-01-01 RX ADMIN — FENTANYL CITRATE 100 MCG: 50 INJECTION, SOLUTION INTRAMUSCULAR; INTRAVENOUS at 22:40

## 2021-01-01 RX ADMIN — PROPOFOL 40 MCG/KG/MIN: 10 INJECTION, EMULSION INTRAVENOUS at 01:54

## 2021-01-01 RX ADMIN — VANCOMYCIN HYDROCHLORIDE 1250 MG: 10 INJECTION, POWDER, LYOPHILIZED, FOR SOLUTION INTRAVENOUS at 22:30

## 2021-01-01 RX ADMIN — SODIUM CHLORIDE, POTASSIUM CHLORIDE, SODIUM LACTATE AND CALCIUM CHLORIDE: 600; 310; 30; 20 INJECTION, SOLUTION INTRAVENOUS at 03:25

## 2021-01-01 RX ADMIN — INSULIN GLARGINE 20 UNITS: 100 INJECTION, SOLUTION SUBCUTANEOUS at 08:40

## 2021-01-01 RX ADMIN — SODIUM CHLORIDE 1000 ML: 9 INJECTION, SOLUTION INTRAVENOUS at 09:43

## 2021-01-01 RX ADMIN — PROPOFOL 35 MCG/KG/MIN: 10 INJECTION, EMULSION INTRAVENOUS at 18:44

## 2021-01-01 RX ADMIN — FUROSEMIDE 20 MG: 10 INJECTION, SOLUTION INTRAMUSCULAR; INTRAVENOUS at 13:31

## 2021-01-01 RX ADMIN — TRIMETHOBENZAMIDE HYDROCHLORIDE 200 MG: 100 INJECTION INTRAMUSCULAR at 22:21

## 2021-01-01 RX ADMIN — SODIUM CHLORIDE, POTASSIUM CHLORIDE, SODIUM LACTATE AND CALCIUM CHLORIDE 500 ML: 600; 310; 30; 20 INJECTION, SOLUTION INTRAVENOUS at 15:25

## 2021-01-01 RX ADMIN — Medication 150 MCG/HR: at 08:47

## 2021-01-01 RX ADMIN — Medication 1.4 MCG/KG/HR: at 04:09

## 2021-01-01 RX ADMIN — PROPOFOL 50 MCG/KG/MIN: 10 INJECTION, EMULSION INTRAVENOUS at 06:45

## 2021-01-01 RX ADMIN — SODIUM CHLORIDE, POTASSIUM CHLORIDE, SODIUM LACTATE AND CALCIUM CHLORIDE: 600; 310; 30; 20 INJECTION, SOLUTION INTRAVENOUS at 13:35

## 2021-01-01 RX ADMIN — ENOXAPARIN SODIUM 40 MG: 40 INJECTION SUBCUTANEOUS at 09:21

## 2021-01-01 RX ADMIN — PROPOFOL 25 MCG/KG/MIN: 10 INJECTION, EMULSION INTRAVENOUS at 02:25

## 2021-01-01 RX ADMIN — ASCORBIC ACID 1500 MG: 500 INJECTION INTRAVENOUS at 06:20

## 2021-01-01 RX ADMIN — IPRATROPIUM BROMIDE AND ALBUTEROL SULFATE 1 AMPULE: .5; 3 SOLUTION RESPIRATORY (INHALATION) at 04:48

## 2021-01-01 RX ADMIN — PANTOPRAZOLE SODIUM 40 MG: 40 INJECTION, POWDER, FOR SOLUTION INTRAVENOUS at 08:10

## 2021-01-01 RX ADMIN — PANTOPRAZOLE SODIUM 40 MG: 40 INJECTION, POWDER, FOR SOLUTION INTRAVENOUS at 08:31

## 2021-01-01 RX ADMIN — Medication 5000 UNITS: at 08:53

## 2021-01-01 RX ADMIN — Medication 10 ML: at 20:32

## 2021-01-01 RX ADMIN — INSULIN LISPRO 4 UNITS: 100 INJECTION, SOLUTION INTRAVENOUS; SUBCUTANEOUS at 08:28

## 2021-01-01 RX ADMIN — SODIUM CHLORIDE, POTASSIUM CHLORIDE, SODIUM LACTATE AND CALCIUM CHLORIDE: 600; 310; 30; 20 INJECTION, SOLUTION INTRAVENOUS at 02:13

## 2021-01-01 RX ADMIN — Medication 0.3 MCG/KG/HR: at 11:21

## 2021-01-01 RX ADMIN — Medication 175 MCG/HR: at 11:30

## 2021-01-01 RX ADMIN — IPRATROPIUM BROMIDE AND ALBUTEROL SULFATE 1 AMPULE: .5; 3 SOLUTION RESPIRATORY (INHALATION) at 01:27

## 2021-01-01 RX ADMIN — CISATRACURIUM BESYLATE 2 MCG/KG/MIN: 10 INJECTION, SOLUTION INTRAVENOUS at 10:29

## 2021-01-01 RX ADMIN — INSULIN GLARGINE 20 UNITS: 100 INJECTION, SOLUTION SUBCUTANEOUS at 09:21

## 2021-01-01 RX ADMIN — SODIUM CHLORIDE, PRESERVATIVE FREE 10 ML: 5 INJECTION INTRAVENOUS at 18:13

## 2021-01-01 RX ADMIN — DOXYCYCLINE 100 MG: 100 INJECTION, POWDER, LYOPHILIZED, FOR SOLUTION INTRAVENOUS at 14:03

## 2021-01-01 RX ADMIN — IPRATROPIUM BROMIDE AND ALBUTEROL SULFATE 1 AMPULE: .5; 3 SOLUTION RESPIRATORY (INHALATION) at 09:45

## 2021-01-01 RX ADMIN — MEROPENEM 1000 MG: 1 INJECTION, POWDER, FOR SOLUTION INTRAVENOUS at 12:28

## 2021-01-01 RX ADMIN — IPRATROPIUM BROMIDE AND ALBUTEROL SULFATE 1 AMPULE: .5; 3 SOLUTION RESPIRATORY (INHALATION) at 17:47

## 2021-01-01 RX ADMIN — SODIUM CHLORIDE, PRESERVATIVE FREE 10 ML: 5 INJECTION INTRAVENOUS at 21:39

## 2021-01-01 RX ADMIN — INSULIN LISPRO 6 UNITS: 100 INJECTION, SOLUTION INTRAVENOUS; SUBCUTANEOUS at 02:59

## 2021-01-01 RX ADMIN — WATER 1000 MG: 1 INJECTION INTRAMUSCULAR; INTRAVENOUS; SUBCUTANEOUS at 12:36

## 2021-01-01 RX ADMIN — SODIUM CHLORIDE, POTASSIUM CHLORIDE, SODIUM LACTATE AND CALCIUM CHLORIDE: 600; 310; 30; 20 INJECTION, SOLUTION INTRAVENOUS at 03:08

## 2021-01-01 RX ADMIN — DEXAMETHASONE SODIUM PHOSPHATE 10 MG: 10 INJECTION, SOLUTION INTRAMUSCULAR; INTRAVENOUS at 09:34

## 2021-01-01 RX ADMIN — ASCORBIC ACID 1500 MG: 500 INJECTION INTRAVENOUS at 12:31

## 2021-01-01 RX ADMIN — PANTOPRAZOLE SODIUM 40 MG: 40 INJECTION, POWDER, FOR SOLUTION INTRAVENOUS at 08:36

## 2021-01-01 RX ADMIN — PANTOPRAZOLE SODIUM 40 MG: 40 INJECTION, POWDER, FOR SOLUTION INTRAVENOUS at 09:32

## 2021-01-01 RX ADMIN — CEFEPIME HYDROCHLORIDE 2000 MG: 2 INJECTION, POWDER, FOR SOLUTION INTRAVENOUS at 21:00

## 2021-01-01 RX ADMIN — PANTOPRAZOLE SODIUM 40 MG: 40 INJECTION, POWDER, FOR SOLUTION INTRAVENOUS at 08:52

## 2021-01-01 RX ADMIN — ENOXAPARIN SODIUM 40 MG: 40 INJECTION SUBCUTANEOUS at 20:31

## 2021-01-01 RX ADMIN — GABAPENTIN 100 MG: 250 SOLUTION ORAL at 10:41

## 2021-01-01 RX ADMIN — PROPOFOL 40 MCG/KG/MIN: 10 INJECTION, EMULSION INTRAVENOUS at 03:06

## 2021-01-01 RX ADMIN — IPRATROPIUM BROMIDE AND ALBUTEROL SULFATE 1 AMPULE: .5; 3 SOLUTION RESPIRATORY (INHALATION) at 06:09

## 2021-01-01 RX ADMIN — DEXTROSE MONOHYDRATE 100 MG: 50 INJECTION, SOLUTION INTRAVENOUS at 10:38

## 2021-01-01 RX ADMIN — IPRATROPIUM BROMIDE AND ALBUTEROL SULFATE 1 AMPULE: .5; 3 SOLUTION RESPIRATORY (INHALATION) at 09:38

## 2021-01-01 RX ADMIN — CISATRACURIUM BESYLATE 3 MCG/KG/MIN: 10 INJECTION, SOLUTION INTRAVENOUS at 03:17

## 2021-01-01 RX ADMIN — IPRATROPIUM BROMIDE AND ALBUTEROL SULFATE 1 AMPULE: .5; 3 SOLUTION RESPIRATORY (INHALATION) at 09:37

## 2021-01-01 RX ADMIN — INSULIN LISPRO 8 UNITS: 100 INJECTION, SOLUTION INTRAVENOUS; SUBCUTANEOUS at 21:16

## 2021-01-01 RX ADMIN — Medication 1.4 MCG/KG/HR: at 05:00

## 2021-01-01 RX ADMIN — Medication 1.4 MCG/KG/HR: at 19:24

## 2021-01-01 RX ADMIN — INSULIN GLARGINE 15 UNITS: 100 INJECTION, SOLUTION SUBCUTANEOUS at 20:38

## 2021-01-01 RX ADMIN — Medication 125 MG/HR: at 14:00

## 2021-01-01 RX ADMIN — SODIUM PHOSPHATE, MONOBASIC, MONOHYDRATE 10 MMOL: 276; 142 INJECTION, SOLUTION INTRAVENOUS at 00:01

## 2021-01-01 RX ADMIN — GLYCOPYRROLATE 0.4 MG: 0.2 INJECTION INTRAMUSCULAR; INTRAVENOUS at 17:39

## 2021-01-01 RX ADMIN — DEXTROSE MONOHYDRATE 100 MG: 50 INJECTION, SOLUTION INTRAVENOUS at 10:27

## 2021-01-01 RX ADMIN — PROPOFOL 35 MCG/KG/MIN: 10 INJECTION, EMULSION INTRAVENOUS at 07:00

## 2021-01-01 RX ADMIN — Medication 10 ML: at 20:13

## 2021-01-01 RX ADMIN — SODIUM CHLORIDE, PRESERVATIVE FREE 10 ML: 5 INJECTION INTRAVENOUS at 08:53

## 2021-01-01 RX ADMIN — SODIUM CHLORIDE, POTASSIUM CHLORIDE, SODIUM LACTATE AND CALCIUM CHLORIDE: 600; 310; 30; 20 INJECTION, SOLUTION INTRAVENOUS at 16:29

## 2021-01-01 RX ADMIN — ENOXAPARIN SODIUM 90 MG: 100 INJECTION SUBCUTANEOUS at 08:39

## 2021-01-01 RX ADMIN — ASCORBIC ACID 1500 MG: 500 INJECTION INTRAVENOUS at 07:05

## 2021-01-01 RX ADMIN — REMDESIVIR 100 MG: 100 INJECTION, POWDER, LYOPHILIZED, FOR SOLUTION INTRAVENOUS at 12:00

## 2021-01-01 RX ADMIN — ENOXAPARIN SODIUM 90 MG: 100 INJECTION SUBCUTANEOUS at 19:57

## 2021-01-01 RX ADMIN — IPRATROPIUM BROMIDE AND ALBUTEROL SULFATE 1 AMPULE: .5; 3 SOLUTION RESPIRATORY (INHALATION) at 22:30

## 2021-01-01 RX ADMIN — Medication 10 ML: at 20:54

## 2021-01-01 RX ADMIN — POTASSIUM CHLORIDE 10 MEQ: 7.46 INJECTION, SOLUTION INTRAVENOUS at 10:35

## 2021-01-01 RX ADMIN — IPRATROPIUM BROMIDE AND ALBUTEROL SULFATE 1 AMPULE: .5; 3 SOLUTION RESPIRATORY (INHALATION) at 12:39

## 2021-01-01 RX ADMIN — INSULIN GLARGINE 40 UNITS: 100 INJECTION, SOLUTION SUBCUTANEOUS at 08:37

## 2021-01-01 RX ADMIN — ENOXAPARIN SODIUM 90 MG: 100 INJECTION SUBCUTANEOUS at 20:45

## 2021-01-01 RX ADMIN — Medication 1.4 MCG/KG/HR: at 17:53

## 2021-01-01 RX ADMIN — ENOXAPARIN SODIUM 40 MG: 40 INJECTION SUBCUTANEOUS at 21:20

## 2021-01-01 RX ADMIN — PROPOFOL 25 MCG/KG/MIN: 10 INJECTION, EMULSION INTRAVENOUS at 12:05

## 2021-01-01 RX ADMIN — INSULIN LISPRO 4 UNITS: 100 INJECTION, SOLUTION INTRAVENOUS; SUBCUTANEOUS at 21:29

## 2021-01-01 RX ADMIN — Medication 5000 UNITS: at 08:38

## 2021-01-01 RX ADMIN — PROPOFOL 35 MCG/KG/MIN: 10 INJECTION, EMULSION INTRAVENOUS at 00:45

## 2021-01-01 RX ADMIN — INSULIN LISPRO 4 UNITS: 100 INJECTION, SOLUTION INTRAVENOUS; SUBCUTANEOUS at 03:25

## 2021-01-01 RX ADMIN — DEXAMETHASONE SODIUM PHOSPHATE 6 MG: 10 INJECTION, SOLUTION INTRAMUSCULAR; INTRAVENOUS at 06:25

## 2021-01-01 RX ADMIN — GABAPENTIN 100 MG: 250 SOLUTION ORAL at 08:47

## 2021-01-01 RX ADMIN — SODIUM CHLORIDE, POTASSIUM CHLORIDE, SODIUM LACTATE AND CALCIUM CHLORIDE: 600; 310; 30; 20 INJECTION, SOLUTION INTRAVENOUS at 08:44

## 2021-01-01 RX ADMIN — CEFEPIME HYDROCHLORIDE 2000 MG: 2 INJECTION, POWDER, FOR SOLUTION INTRAVENOUS at 21:30

## 2021-01-01 RX ADMIN — INSULIN LISPRO 2 UNITS: 100 INJECTION, SOLUTION INTRAVENOUS; SUBCUTANEOUS at 20:30

## 2021-01-01 RX ADMIN — INSULIN LISPRO 6 UNITS: 100 INJECTION, SOLUTION INTRAVENOUS; SUBCUTANEOUS at 09:21

## 2021-01-01 RX ADMIN — SODIUM CHLORIDE, PRESERVATIVE FREE 10 ML: 5 INJECTION INTRAVENOUS at 08:06

## 2021-01-01 RX ADMIN — DEXAMETHASONE SODIUM PHOSPHATE 6 MG: 10 INJECTION, SOLUTION INTRAMUSCULAR; INTRAVENOUS at 05:13

## 2021-01-01 RX ADMIN — INSULIN GLARGINE 15 UNITS: 100 INJECTION, SOLUTION SUBCUTANEOUS at 13:30

## 2021-01-01 RX ADMIN — Medication 150 MCG/HR: at 20:09

## 2021-01-01 RX ADMIN — Medication 0.5 MCG/KG/HR: at 23:23

## 2021-01-01 RX ADMIN — Medication 150 MCG/HR: at 18:11

## 2021-01-01 RX ADMIN — DEXAMETHASONE SODIUM PHOSPHATE 6 MG: 10 INJECTION, SOLUTION INTRAMUSCULAR; INTRAVENOUS at 07:05

## 2021-01-01 RX ADMIN — Medication 10 ML: at 07:53

## 2021-01-01 RX ADMIN — PANTOPRAZOLE SODIUM 40 MG: 40 INJECTION, POWDER, FOR SOLUTION INTRAVENOUS at 08:06

## 2021-01-01 RX ADMIN — IPRATROPIUM BROMIDE AND ALBUTEROL SULFATE 1 AMPULE: .5; 3 SOLUTION RESPIRATORY (INHALATION) at 13:15

## 2021-01-01 RX ADMIN — SODIUM CHLORIDE, PRESERVATIVE FREE 10 ML: 5 INJECTION INTRAVENOUS at 07:50

## 2021-01-01 RX ADMIN — KETAMINE HYDROCHLORIDE 0.5 MG/KG/HR: 50 INJECTION, SOLUTION INTRAMUSCULAR; INTRAVENOUS at 11:00

## 2021-01-01 RX ADMIN — POTASSIUM CHLORIDE 10 MEQ: 7.46 INJECTION, SOLUTION INTRAVENOUS at 23:52

## 2021-01-01 RX ADMIN — ENOXAPARIN SODIUM 90 MG: 100 INJECTION SUBCUTANEOUS at 08:32

## 2021-01-01 RX ADMIN — DEXTROSE AND SODIUM CHLORIDE: 5; 450 INJECTION, SOLUTION INTRAVENOUS at 10:57

## 2021-01-01 RX ADMIN — IVERMECTIN 19.5 MG: 3 TABLET ORAL at 08:48

## 2021-01-01 RX ADMIN — INSULIN LISPRO 4 UNITS: 100 INJECTION, SOLUTION INTRAVENOUS; SUBCUTANEOUS at 03:45

## 2021-01-01 RX ADMIN — CEFEPIME HYDROCHLORIDE 2000 MG: 2 INJECTION, POWDER, FOR SOLUTION INTRAVENOUS at 11:40

## 2021-01-01 RX ADMIN — DEXTROSE MONOHYDRATE 100 MG: 50 INJECTION, SOLUTION INTRAVENOUS at 10:41

## 2021-01-01 RX ADMIN — ZINC SULFATE 220 MG (50 MG) CAPSULE 50 MG: CAPSULE at 07:50

## 2021-01-01 RX ADMIN — DEXTROSE MONOHYDRATE 100 MG: 50 INJECTION, SOLUTION INTRAVENOUS at 11:23

## 2021-01-01 RX ADMIN — ACYCLOVIR SODIUM 500 MG: 500 INJECTION, SOLUTION INTRAVENOUS at 12:27

## 2021-01-01 RX ADMIN — INSULIN LISPRO 2 UNITS: 100 INJECTION, SOLUTION INTRAVENOUS; SUBCUTANEOUS at 08:49

## 2021-01-01 RX ADMIN — Medication 25 MCG/HR: at 15:00

## 2021-01-01 RX ADMIN — Medication 125 MCG/HR: at 06:25

## 2021-01-01 RX ADMIN — SODIUM CHLORIDE, PRESERVATIVE FREE 10 ML: 5 INJECTION INTRAVENOUS at 09:32

## 2021-01-01 RX ADMIN — FENTANYL CITRATE 50 MCG: 50 INJECTION, SOLUTION INTRAMUSCULAR; INTRAVENOUS at 18:36

## 2021-01-01 RX ADMIN — BUPROPION HYDROCHLORIDE 100 MG: 100 TABLET, FILM COATED ORAL at 08:41

## 2021-01-01 RX ADMIN — Medication 0.2 MCG/KG/HR: at 21:07

## 2021-01-01 RX ADMIN — LEVOTHYROXINE SODIUM 175 MCG: 75 TABLET ORAL at 06:23

## 2021-01-01 RX ADMIN — INSULIN GLARGINE 30 UNITS: 100 INJECTION, SOLUTION SUBCUTANEOUS at 08:28

## 2021-01-01 RX ADMIN — PANTOPRAZOLE SODIUM 40 MG: 40 INJECTION, POWDER, FOR SOLUTION INTRAVENOUS at 19:57

## 2021-01-01 RX ADMIN — HYDRALAZINE HYDROCHLORIDE 10 MG: 20 INJECTION INTRAMUSCULAR; INTRAVENOUS at 18:13

## 2021-01-01 RX ADMIN — ACETAMINOPHEN 650 MG: 325 TABLET, FILM COATED ORAL at 19:19

## 2021-01-01 RX ADMIN — Medication 10 ML: at 20:37

## 2021-01-01 RX ADMIN — Medication 5000 UNITS: at 07:58

## 2021-01-01 RX ADMIN — Medication 10 ML: at 21:04

## 2021-01-01 RX ADMIN — ENOXAPARIN SODIUM 40 MG: 40 INJECTION SUBCUTANEOUS at 21:15

## 2021-01-01 RX ADMIN — Medication 1 MCG/KG/HR: at 04:30

## 2021-01-01 RX ADMIN — INSULIN GLARGINE 32 UNITS: 100 INJECTION, SOLUTION SUBCUTANEOUS at 21:30

## 2021-01-01 RX ADMIN — VANCOMYCIN HYDROCHLORIDE 1000 MG: 1 INJECTION, POWDER, LYOPHILIZED, FOR SOLUTION INTRAVENOUS at 14:45

## 2021-01-01 RX ADMIN — PROPOFOL 35 MCG/KG/MIN: 10 INJECTION, EMULSION INTRAVENOUS at 15:00

## 2021-01-01 RX ADMIN — Medication 1.4 MCG/KG/HR: at 09:15

## 2021-01-01 RX ADMIN — DEXTROSE MONOHYDRATE 12.5 G: 500 INJECTION PARENTERAL at 03:03

## 2021-01-01 RX ADMIN — INSULIN LISPRO 6 UNITS: 100 INJECTION, SOLUTION INTRAVENOUS; SUBCUTANEOUS at 15:16

## 2021-01-01 RX ADMIN — SODIUM CHLORIDE, PRESERVATIVE FREE 10 ML: 5 INJECTION INTRAVENOUS at 18:41

## 2021-01-01 RX ADMIN — IPRATROPIUM BROMIDE AND ALBUTEROL SULFATE 1 AMPULE: .5; 3 SOLUTION RESPIRATORY (INHALATION) at 10:11

## 2021-01-01 RX ADMIN — INSULIN LISPRO 2 UNITS: 100 INJECTION, SOLUTION INTRAVENOUS; SUBCUTANEOUS at 03:18

## 2021-01-01 RX ADMIN — PROPOFOL 40 MCG/KG/MIN: 10 INJECTION, EMULSION INTRAVENOUS at 09:28

## 2021-01-01 RX ADMIN — INSULIN GLARGINE 30 UNITS: 100 INJECTION, SOLUTION SUBCUTANEOUS at 11:19

## 2021-01-01 RX ADMIN — WATER 1000 MG: 1 INJECTION INTRAMUSCULAR; INTRAVENOUS; SUBCUTANEOUS at 14:19

## 2021-01-01 RX ADMIN — Medication 10 ML: at 08:07

## 2021-01-01 RX ADMIN — INSULIN LISPRO 4 UNITS: 100 INJECTION, SOLUTION INTRAVENOUS; SUBCUTANEOUS at 17:00

## 2021-01-01 RX ADMIN — ENOXAPARIN SODIUM 90 MG: 100 INJECTION SUBCUTANEOUS at 20:18

## 2021-01-01 RX ADMIN — SODIUM CHLORIDE, PRESERVATIVE FREE 10 ML: 5 INJECTION INTRAVENOUS at 08:33

## 2021-01-01 RX ADMIN — INSULIN LISPRO 6 UNITS: 100 INJECTION, SOLUTION INTRAVENOUS; SUBCUTANEOUS at 14:41

## 2021-01-01 RX ADMIN — IPRATROPIUM BROMIDE AND ALBUTEROL SULFATE 1 AMPULE: .5; 3 SOLUTION RESPIRATORY (INHALATION) at 14:45

## 2021-01-01 RX ADMIN — SODIUM CHLORIDE, PRESERVATIVE FREE 10 ML: 5 INJECTION INTRAVENOUS at 19:20

## 2021-01-01 RX ADMIN — INSULIN LISPRO 8 UNITS: 100 INJECTION, SOLUTION INTRAVENOUS; SUBCUTANEOUS at 15:30

## 2021-01-01 RX ADMIN — ZINC SULFATE 220 MG (50 MG) CAPSULE 50 MG: CAPSULE at 08:31

## 2021-01-01 RX ADMIN — ASCORBIC ACID 1500 MG: 500 INJECTION INTRAVENOUS at 06:17

## 2021-01-01 RX ADMIN — ETOMIDATE 20 MG: 2 INJECTION INTRAVENOUS at 22:40

## 2021-01-01 RX ADMIN — IPRATROPIUM BROMIDE AND ALBUTEROL SULFATE 1 AMPULE: .5; 3 SOLUTION RESPIRATORY (INHALATION) at 17:27

## 2021-01-01 RX ADMIN — PANTOPRAZOLE SODIUM 40 MG: 40 INJECTION, POWDER, FOR SOLUTION INTRAVENOUS at 08:38

## 2021-01-01 RX ADMIN — CEFEPIME HYDROCHLORIDE 2000 MG: 2 INJECTION, POWDER, FOR SOLUTION INTRAVENOUS at 20:51

## 2021-01-01 RX ADMIN — IPRATROPIUM BROMIDE AND ALBUTEROL SULFATE 1 AMPULE: .5; 3 SOLUTION RESPIRATORY (INHALATION) at 01:19

## 2021-01-01 RX ADMIN — Medication 1.4 MCG/KG/HR: at 22:46

## 2021-01-01 RX ADMIN — Medication 5000 UNITS: at 08:32

## 2021-01-01 RX ADMIN — PROPOFOL 35 MCG/KG/MIN: 10 INJECTION, EMULSION INTRAVENOUS at 05:07

## 2021-01-01 RX ADMIN — IPRATROPIUM BROMIDE AND ALBUTEROL SULFATE 1 AMPULE: .5; 3 SOLUTION RESPIRATORY (INHALATION) at 05:07

## 2021-01-01 RX ADMIN — IPRATROPIUM BROMIDE AND ALBUTEROL SULFATE 1 AMPULE: .5; 3 SOLUTION RESPIRATORY (INHALATION) at 09:28

## 2021-01-01 RX ADMIN — IPRATROPIUM BROMIDE AND ALBUTEROL SULFATE 1 AMPULE: .5; 3 SOLUTION RESPIRATORY (INHALATION) at 01:40

## 2021-01-01 RX ADMIN — Medication 10 ML: at 20:14

## 2021-01-01 RX ADMIN — IPRATROPIUM BROMIDE AND ALBUTEROL SULFATE 1 AMPULE: .5; 3 SOLUTION RESPIRATORY (INHALATION) at 21:27

## 2021-01-01 RX ADMIN — Medication 10 ML: at 20:48

## 2021-01-01 RX ADMIN — DEXTROSE MONOHYDRATE 100 MG: 50 INJECTION, SOLUTION INTRAVENOUS at 10:57

## 2021-01-01 RX ADMIN — GABAPENTIN 100 MG: 250 SOLUTION ORAL at 08:32

## 2021-01-01 RX ADMIN — PROPOFOL 35 MCG/KG/MIN: 10 INJECTION, EMULSION INTRAVENOUS at 14:09

## 2021-01-01 RX ADMIN — SODIUM CHLORIDE, PRESERVATIVE FREE 10 ML: 5 INJECTION INTRAVENOUS at 20:18

## 2021-01-01 RX ADMIN — ZINC SULFATE 220 MG (50 MG) CAPSULE 50 MG: CAPSULE at 08:53

## 2021-01-01 RX ADMIN — IPRATROPIUM BROMIDE AND ALBUTEROL SULFATE 1 AMPULE: .5; 3 SOLUTION RESPIRATORY (INHALATION) at 22:23

## 2021-01-01 RX ADMIN — FUROSEMIDE 40 MG: 10 INJECTION, SOLUTION INTRAMUSCULAR; INTRAVENOUS at 10:51

## 2021-01-01 RX ADMIN — INSULIN LISPRO 2 UNITS: 100 INJECTION, SOLUTION INTRAVENOUS; SUBCUTANEOUS at 08:42

## 2021-01-01 RX ADMIN — SODIUM CHLORIDE, PRESERVATIVE FREE 10 ML: 5 INJECTION INTRAVENOUS at 08:40

## 2021-01-01 RX ADMIN — INSULIN GLARGINE 20 UNITS: 100 INJECTION, SOLUTION SUBCUTANEOUS at 21:20

## 2021-01-01 RX ADMIN — DEXTROSE MONOHYDRATE 200 MG: 50 INJECTION, SOLUTION INTRAVENOUS at 11:40

## 2021-01-01 RX ADMIN — IPRATROPIUM BROMIDE AND ALBUTEROL SULFATE 1 AMPULE: .5; 3 SOLUTION RESPIRATORY (INHALATION) at 21:59

## 2021-01-01 RX ADMIN — FENTANYL CITRATE 50 MCG: 50 INJECTION, SOLUTION INTRAMUSCULAR; INTRAVENOUS at 23:32

## 2021-01-01 RX ADMIN — Medication 10 ML: at 07:58

## 2021-01-01 RX ADMIN — Medication 200 MG: at 12:40

## 2021-01-01 RX ADMIN — SODIUM CHLORIDE, POTASSIUM CHLORIDE, SODIUM LACTATE AND CALCIUM CHLORIDE: 600; 310; 30; 20 INJECTION, SOLUTION INTRAVENOUS at 22:24

## 2021-01-01 RX ADMIN — Medication 10 ML: at 08:33

## 2021-01-01 RX ADMIN — Medication 10 ML: at 20:09

## 2021-01-01 RX ADMIN — DEXTROSE MONOHYDRATE 100 MG: 50 INJECTION, SOLUTION INTRAVENOUS at 10:51

## 2021-01-01 RX ADMIN — PROPOFOL 25 MCG/KG/MIN: 10 INJECTION, EMULSION INTRAVENOUS at 08:52

## 2021-01-01 RX ADMIN — DEXAMETHASONE SODIUM PHOSPHATE 6 MG: 10 INJECTION, SOLUTION INTRAMUSCULAR; INTRAVENOUS at 05:53

## 2021-01-01 RX ADMIN — SODIUM CHLORIDE, PRESERVATIVE FREE 10 ML: 5 INJECTION INTRAVENOUS at 07:51

## 2021-01-01 RX ADMIN — Medication 10 ML: at 08:18

## 2021-01-01 RX ADMIN — SODIUM CHLORIDE, PRESERVATIVE FREE 10 ML: 5 INJECTION INTRAVENOUS at 20:19

## 2021-01-01 RX ADMIN — DEXTROSE MONOHYDRATE 12.5 G: 500 INJECTION PARENTERAL at 03:38

## 2021-01-01 RX ADMIN — IPRATROPIUM BROMIDE AND ALBUTEROL SULFATE 1 AMPULE: .5; 3 SOLUTION RESPIRATORY (INHALATION) at 17:49

## 2021-01-01 RX ADMIN — INSULIN LISPRO 2 UNITS: 100 INJECTION, SOLUTION INTRAVENOUS; SUBCUTANEOUS at 20:48

## 2021-01-01 RX ADMIN — INSULIN LISPRO 10 UNITS: 100 INJECTION, SOLUTION INTRAVENOUS; SUBCUTANEOUS at 09:24

## 2021-01-01 RX ADMIN — SODIUM CHLORIDE, POTASSIUM CHLORIDE, SODIUM LACTATE AND CALCIUM CHLORIDE: 600; 310; 30; 20 INJECTION, SOLUTION INTRAVENOUS at 14:24

## 2021-01-01 RX ADMIN — SODIUM CHLORIDE, PRESERVATIVE FREE 10 ML: 5 INJECTION INTRAVENOUS at 08:16

## 2021-01-01 RX ADMIN — PROPOFOL 25 MCG/KG/MIN: 10 INJECTION, EMULSION INTRAVENOUS at 04:30

## 2021-01-01 RX ADMIN — INSULIN GLARGINE 30 UNITS: 100 INJECTION, SOLUTION SUBCUTANEOUS at 08:42

## 2021-01-01 RX ADMIN — POTASSIUM CHLORIDE 10 MEQ: 7.46 INJECTION, SOLUTION INTRAVENOUS at 02:09

## 2021-01-01 RX ADMIN — PANTOPRAZOLE SODIUM 40 MG: 40 INJECTION, POWDER, FOR SOLUTION INTRAVENOUS at 18:41

## 2021-01-01 RX ADMIN — INSULIN LISPRO 2 UNITS: 100 INJECTION, SOLUTION INTRAVENOUS; SUBCUTANEOUS at 08:37

## 2021-01-01 RX ADMIN — Medication 5000 UNITS: at 07:50

## 2021-01-01 RX ADMIN — DIPHENHYDRAMINE HYDROCHLORIDE 25 MG: 50 INJECTION, SOLUTION INTRAMUSCULAR; INTRAVENOUS at 19:20

## 2021-01-01 RX ADMIN — ASCORBIC ACID 1500 MG: 500 INJECTION INTRAVENOUS at 13:07

## 2021-01-01 RX ADMIN — CISATRACURIUM BESYLATE 3 MCG/KG/MIN: 10 INJECTION, SOLUTION INTRAVENOUS at 02:12

## 2021-01-01 RX ADMIN — BUPROPION HYDROCHLORIDE 100 MG: 100 TABLET, FILM COATED ORAL at 10:56

## 2021-01-01 RX ADMIN — INSULIN LISPRO 6 UNITS: 100 INJECTION, SOLUTION INTRAVENOUS; SUBCUTANEOUS at 14:55

## 2021-01-01 RX ADMIN — SODIUM CHLORIDE, POTASSIUM CHLORIDE, SODIUM LACTATE AND CALCIUM CHLORIDE: 600; 310; 30; 20 INJECTION, SOLUTION INTRAVENOUS at 13:45

## 2021-01-01 RX ADMIN — IPRATROPIUM BROMIDE AND ALBUTEROL SULFATE 1 AMPULE: .5; 3 SOLUTION RESPIRATORY (INHALATION) at 05:51

## 2021-01-01 RX ADMIN — PANTOPRAZOLE SODIUM 40 MG: 40 INJECTION, POWDER, FOR SOLUTION INTRAVENOUS at 09:23

## 2021-01-01 RX ADMIN — INSULIN LISPRO 6 UNITS: 100 INJECTION, SOLUTION INTRAVENOUS; SUBCUTANEOUS at 17:06

## 2021-01-01 RX ADMIN — IPRATROPIUM BROMIDE AND ALBUTEROL SULFATE 1 AMPULE: .5; 3 SOLUTION RESPIRATORY (INHALATION) at 05:53

## 2021-01-01 RX ADMIN — PROPOFOL 40 MCG/KG/MIN: 10 INJECTION, EMULSION INTRAVENOUS at 22:20

## 2021-01-01 RX ADMIN — DEXTROSE MONOHYDRATE 12.5 G: 500 INJECTION PARENTERAL at 20:40

## 2021-01-01 RX ADMIN — REMDESIVIR 200 MG: 100 INJECTION, POWDER, LYOPHILIZED, FOR SOLUTION INTRAVENOUS at 11:46

## 2021-01-01 RX ADMIN — SODIUM CHLORIDE, POTASSIUM CHLORIDE, SODIUM LACTATE AND CALCIUM CHLORIDE: 600; 310; 30; 20 INJECTION, SOLUTION INTRAVENOUS at 17:31

## 2021-01-01 RX ADMIN — INSULIN LISPRO 2 UNITS: 100 INJECTION, SOLUTION INTRAVENOUS; SUBCUTANEOUS at 20:49

## 2021-01-01 RX ADMIN — ASCORBIC ACID 1500 MG: 500 INJECTION INTRAVENOUS at 01:23

## 2021-01-01 RX ADMIN — IPRATROPIUM BROMIDE AND ALBUTEROL SULFATE 1 AMPULE: .5; 3 SOLUTION RESPIRATORY (INHALATION) at 01:59

## 2021-01-01 RX ADMIN — CISATRACURIUM BESYLATE 1.5 MCG/KG/MIN: 10 INJECTION, SOLUTION INTRAVENOUS at 04:38

## 2021-01-01 RX ADMIN — INSULIN GLARGINE 5 UNITS: 100 INJECTION, SOLUTION SUBCUTANEOUS at 22:06

## 2021-01-01 RX ADMIN — REMDESIVIR 100 MG: 100 INJECTION, POWDER, LYOPHILIZED, FOR SOLUTION INTRAVENOUS at 11:09

## 2021-01-01 RX ADMIN — IPRATROPIUM BROMIDE AND ALBUTEROL SULFATE 1 AMPULE: .5; 3 SOLUTION RESPIRATORY (INHALATION) at 06:22

## 2021-01-01 RX ADMIN — CEFEPIME HYDROCHLORIDE 2000 MG: 2 INJECTION, POWDER, FOR SOLUTION INTRAVENOUS at 08:55

## 2021-01-01 RX ADMIN — POLYVINYL ALCOHOL 2 DROP: 14 SOLUTION/ DROPS OPHTHALMIC at 14:34

## 2021-01-01 RX ADMIN — ZINC SULFATE 220 MG (50 MG) CAPSULE 50 MG: CAPSULE at 07:58

## 2021-01-01 RX ADMIN — ENOXAPARIN SODIUM 90 MG: 100 INJECTION SUBCUTANEOUS at 21:00

## 2021-01-01 RX ADMIN — ENOXAPARIN SODIUM 40 MG: 40 INJECTION SUBCUTANEOUS at 09:24

## 2021-01-01 RX ADMIN — SODIUM CHLORIDE, PRESERVATIVE FREE 10 ML: 5 INJECTION INTRAVENOUS at 07:59

## 2021-01-01 RX ADMIN — PROPOFOL 20 MCG/KG/MIN: 10 INJECTION, EMULSION INTRAVENOUS at 23:16

## 2021-01-01 RX ADMIN — REMDESIVIR 100 MG: 100 INJECTION, POWDER, LYOPHILIZED, FOR SOLUTION INTRAVENOUS at 12:58

## 2021-01-01 RX ADMIN — HYDRALAZINE HYDROCHLORIDE 10 MG: 20 INJECTION INTRAMUSCULAR; INTRAVENOUS at 14:22

## 2021-01-01 RX ADMIN — BUPROPION HYDROCHLORIDE 100 MG: 100 TABLET, FILM COATED ORAL at 20:15

## 2021-01-01 RX ADMIN — SODIUM CHLORIDE, POTASSIUM CHLORIDE, SODIUM LACTATE AND CALCIUM CHLORIDE: 600; 310; 30; 20 INJECTION, SOLUTION INTRAVENOUS at 18:12

## 2021-01-01 RX ADMIN — SODIUM CHLORIDE, PRESERVATIVE FREE 10 ML: 5 INJECTION INTRAVENOUS at 20:37

## 2021-01-01 RX ADMIN — SODIUM CHLORIDE, PRESERVATIVE FREE 10 ML: 5 INJECTION INTRAVENOUS at 08:39

## 2021-01-01 RX ADMIN — IPRATROPIUM BROMIDE AND ALBUTEROL SULFATE 1 AMPULE: .5; 3 SOLUTION RESPIRATORY (INHALATION) at 01:44

## 2021-01-01 RX ADMIN — DEXAMETHASONE SODIUM PHOSPHATE 6 MG: 10 INJECTION, SOLUTION INTRAMUSCULAR; INTRAVENOUS at 06:27

## 2021-01-01 RX ADMIN — Medication 10 ML: at 08:36

## 2021-01-01 RX ADMIN — LEVOTHYROXINE SODIUM 175 MCG: 75 TABLET ORAL at 06:42

## 2021-01-01 RX ADMIN — INSULIN LISPRO 6 UNITS: 100 INJECTION, SOLUTION INTRAVENOUS; SUBCUTANEOUS at 21:21

## 2021-01-01 RX ADMIN — DEXTROSE MONOHYDRATE 100 MG: 50 INJECTION, SOLUTION INTRAVENOUS at 11:00

## 2021-01-01 RX ADMIN — Medication 25 MCG/HR: at 20:07

## 2021-01-01 RX ADMIN — IPRATROPIUM BROMIDE AND ALBUTEROL SULFATE 1 AMPULE: .5; 3 SOLUTION RESPIRATORY (INHALATION) at 02:06

## 2021-01-01 RX ADMIN — SODIUM CHLORIDE, PRESERVATIVE FREE 10 ML: 5 INJECTION INTRAVENOUS at 20:14

## 2021-01-01 RX ADMIN — VANCOMYCIN HYDROCHLORIDE 1000 MG: 1 INJECTION, POWDER, LYOPHILIZED, FOR SOLUTION INTRAVENOUS at 22:03

## 2021-01-01 RX ADMIN — REMDESIVIR 100 MG: 100 INJECTION, POWDER, LYOPHILIZED, FOR SOLUTION INTRAVENOUS at 11:13

## 2021-01-01 RX ADMIN — Medication 1.3 MCG/KG/HR: at 05:20

## 2021-01-01 RX ADMIN — AZITHROMYCIN DIHYDRATE 500 MG: 500 INJECTION, POWDER, LYOPHILIZED, FOR SOLUTION INTRAVENOUS at 19:13

## 2021-01-01 RX ADMIN — IPRATROPIUM BROMIDE AND ALBUTEROL SULFATE 1 AMPULE: .5; 3 SOLUTION RESPIRATORY (INHALATION) at 08:53

## 2021-01-01 RX ADMIN — IPRATROPIUM BROMIDE AND ALBUTEROL SULFATE 1 AMPULE: .5; 3 SOLUTION RESPIRATORY (INHALATION) at 16:09

## 2021-01-01 RX ADMIN — IOPAMIDOL 75 ML: 755 INJECTION, SOLUTION INTRAVENOUS at 11:32

## 2021-01-01 RX ADMIN — CEFEPIME HYDROCHLORIDE 2000 MG: 2 INJECTION, POWDER, FOR SOLUTION INTRAVENOUS at 21:46

## 2021-01-01 RX ADMIN — BUPROPION HYDROCHLORIDE 100 MG: 100 TABLET, FILM COATED ORAL at 10:42

## 2021-01-01 RX ADMIN — INSULIN LISPRO 4 UNITS: 100 INJECTION, SOLUTION INTRAVENOUS; SUBCUTANEOUS at 08:34

## 2021-01-01 RX ADMIN — ASCORBIC ACID 1500 MG: 500 INJECTION INTRAVENOUS at 06:27

## 2021-01-01 RX ADMIN — Medication 1.4 MCG/KG/HR: at 21:57

## 2021-01-01 RX ADMIN — ASCORBIC ACID 1500 MG: 500 INJECTION INTRAVENOUS at 13:31

## 2021-01-01 RX ADMIN — PROPOFOL 35 MCG/KG/MIN: 10 INJECTION, EMULSION INTRAVENOUS at 10:27

## 2021-01-01 RX ADMIN — IPRATROPIUM BROMIDE AND ALBUTEROL SULFATE 1 AMPULE: .5; 3 SOLUTION RESPIRATORY (INHALATION) at 12:02

## 2021-01-01 RX ADMIN — ENOXAPARIN SODIUM 90 MG: 100 INJECTION SUBCUTANEOUS at 08:35

## 2021-01-01 RX ADMIN — SODIUM CHLORIDE 24.3 UNITS/HR: 9 INJECTION, SOLUTION INTRAVENOUS at 03:13

## 2021-01-01 RX ADMIN — POTASSIUM CHLORIDE 10 MEQ: 7.46 INJECTION, SOLUTION INTRAVENOUS at 01:11

## 2021-01-01 RX ADMIN — ENOXAPARIN SODIUM 40 MG: 40 INJECTION SUBCUTANEOUS at 18:31

## 2021-01-01 RX ADMIN — TRIMETHOBENZAMIDE HYDROCHLORIDE 200 MG: 100 INJECTION INTRAMUSCULAR at 22:04

## 2021-01-01 RX ADMIN — POLYVINYL ALCOHOL 2 DROP: 14 SOLUTION/ DROPS OPHTHALMIC at 17:01

## 2021-01-01 RX ADMIN — Medication 1.4 MCG/KG/HR: at 13:17

## 2021-01-01 RX ADMIN — Medication 300 UNITS: at 08:48

## 2021-01-01 RX ADMIN — IPRATROPIUM BROMIDE AND ALBUTEROL SULFATE 1 AMPULE: .5; 3 SOLUTION RESPIRATORY (INHALATION) at 13:11

## 2021-01-01 RX ADMIN — IPRATROPIUM BROMIDE AND ALBUTEROL SULFATE 1 AMPULE: .5; 3 SOLUTION RESPIRATORY (INHALATION) at 13:45

## 2021-01-01 RX ADMIN — SODIUM CHLORIDE, POTASSIUM CHLORIDE, SODIUM LACTATE AND CALCIUM CHLORIDE 250 ML: 600; 310; 30; 20 INJECTION, SOLUTION INTRAVENOUS at 22:15

## 2021-01-01 RX ADMIN — CEFEPIME HYDROCHLORIDE 2000 MG: 2 INJECTION, POWDER, FOR SOLUTION INTRAVENOUS at 21:50

## 2021-01-01 RX ADMIN — Medication 10 ML: at 20:19

## 2021-01-01 RX ADMIN — ENOXAPARIN SODIUM 90 MG: 100 INJECTION SUBCUTANEOUS at 08:15

## 2021-01-01 RX ADMIN — IPRATROPIUM BROMIDE AND ALBUTEROL SULFATE 1 AMPULE: .5; 3 SOLUTION RESPIRATORY (INHALATION) at 22:29

## 2021-01-01 RX ADMIN — PROPOFOL 40 MCG/KG/MIN: 10 INJECTION, EMULSION INTRAVENOUS at 06:03

## 2021-01-01 RX ADMIN — SODIUM CHLORIDE, POTASSIUM CHLORIDE, SODIUM LACTATE AND CALCIUM CHLORIDE: 600; 310; 30; 20 INJECTION, SOLUTION INTRAVENOUS at 03:30

## 2021-01-01 RX ADMIN — IPRATROPIUM BROMIDE AND ALBUTEROL SULFATE 3 AMPULE: .5; 3 SOLUTION RESPIRATORY (INHALATION) at 08:50

## 2021-01-01 RX ADMIN — ASCORBIC ACID 1500 MG: 500 INJECTION INTRAVENOUS at 19:23

## 2021-01-01 RX ADMIN — Medication 10 ML: at 21:39

## 2021-01-01 RX ADMIN — INSULIN LISPRO 6 UNITS: 100 INJECTION, SOLUTION INTRAVENOUS; SUBCUTANEOUS at 03:36

## 2021-01-01 RX ADMIN — Medication 0.9 MCG/KG/HR: at 20:45

## 2021-01-01 RX ADMIN — DEXAMETHASONE SODIUM PHOSPHATE 6 MG: 10 INJECTION, SOLUTION INTRAMUSCULAR; INTRAVENOUS at 06:45

## 2021-01-01 RX ADMIN — PROPOFOL 40 MCG/KG/MIN: 10 INJECTION, EMULSION INTRAVENOUS at 02:11

## 2021-01-01 RX ADMIN — Medication 1.4 MCG/KG/HR: at 10:38

## 2021-01-01 RX ADMIN — INSULIN GLARGINE 8 UNITS: 100 INJECTION, SOLUTION SUBCUTANEOUS at 10:50

## 2021-01-01 RX ADMIN — PROPOFOL 40 MCG/KG/MIN: 10 INJECTION, EMULSION INTRAVENOUS at 06:36

## 2021-01-01 RX ADMIN — Medication 1 MCG/KG/HR: at 09:35

## 2021-01-01 RX ADMIN — Medication 10 ML: at 21:19

## 2021-01-01 RX ADMIN — SODIUM CHLORIDE, PRESERVATIVE FREE 10 ML: 5 INJECTION INTRAVENOUS at 08:41

## 2021-01-01 RX ADMIN — IPRATROPIUM BROMIDE AND ALBUTEROL SULFATE 1 AMPULE: .5; 3 SOLUTION RESPIRATORY (INHALATION) at 12:25

## 2021-01-01 RX ADMIN — DEXAMETHASONE SODIUM PHOSPHATE 6 MG: 10 INJECTION, SOLUTION INTRAMUSCULAR; INTRAVENOUS at 06:42

## 2021-01-01 RX ADMIN — CISATRACURIUM BESYLATE 1 MCG/KG/MIN: 10 INJECTION, SOLUTION INTRAVENOUS at 03:30

## 2021-01-01 RX ADMIN — INSULIN GLARGINE 20 UNITS: 100 INJECTION, SOLUTION SUBCUTANEOUS at 20:31

## 2021-01-01 RX ADMIN — IPRATROPIUM BROMIDE AND ALBUTEROL SULFATE 1 AMPULE: .5; 3 SOLUTION RESPIRATORY (INHALATION) at 16:13

## 2021-01-01 RX ADMIN — INSULIN LISPRO 2 UNITS: 100 INJECTION, SOLUTION INTRAVENOUS; SUBCUTANEOUS at 03:27

## 2021-01-01 RX ADMIN — PANTOPRAZOLE SODIUM 40 MG: 40 INJECTION, POWDER, FOR SOLUTION INTRAVENOUS at 08:33

## 2021-01-01 RX ADMIN — IPRATROPIUM BROMIDE AND ALBUTEROL SULFATE 1 AMPULE: .5; 3 SOLUTION RESPIRATORY (INHALATION) at 13:55

## 2021-01-01 RX ADMIN — ZINC SULFATE 220 MG (50 MG) CAPSULE 50 MG: CAPSULE at 08:32

## 2021-01-01 RX ADMIN — ASCORBIC ACID 1500 MG: 500 INJECTION INTRAVENOUS at 20:30

## 2021-01-01 RX ADMIN — VANCOMYCIN HYDROCHLORIDE 1000 MG: 1 INJECTION, POWDER, LYOPHILIZED, FOR SOLUTION INTRAVENOUS at 06:04

## 2021-01-01 RX ADMIN — IPRATROPIUM BROMIDE AND ALBUTEROL SULFATE 1 AMPULE: .5; 3 SOLUTION RESPIRATORY (INHALATION) at 10:18

## 2021-01-01 RX ADMIN — Medication 5000 UNITS: at 08:33

## 2021-01-01 RX ADMIN — Medication 1.4 MCG/KG/HR: at 07:17

## 2021-01-01 RX ADMIN — IPRATROPIUM BROMIDE AND ALBUTEROL SULFATE 1 AMPULE: .5; 3 SOLUTION RESPIRATORY (INHALATION) at 21:55

## 2021-01-01 RX ADMIN — INSULIN HUMAN 10 UNITS: 100 INJECTION, SOLUTION PARENTERAL at 14:00

## 2021-01-01 RX ADMIN — PROPOFOL 50 MCG/KG/MIN: 10 INJECTION, EMULSION INTRAVENOUS at 08:01

## 2021-01-01 RX ADMIN — LORAZEPAM 2 MG: 2 INJECTION INTRAMUSCULAR; INTRAVENOUS at 20:48

## 2021-01-01 RX ADMIN — ZINC SULFATE 220 MG (50 MG) CAPSULE 50 MG: CAPSULE at 08:33

## 2021-01-01 RX ADMIN — Medication 10 ML: at 20:20

## 2021-01-01 RX ADMIN — ASCORBIC ACID 1500 MG: 500 INJECTION INTRAVENOUS at 19:13

## 2021-01-01 RX ADMIN — DEXAMETHASONE SODIUM PHOSPHATE 6 MG: 10 INJECTION, SOLUTION INTRAMUSCULAR; INTRAVENOUS at 14:34

## 2021-01-01 RX ADMIN — ZINC SULFATE 220 MG (50 MG) CAPSULE 50 MG: CAPSULE at 08:09

## 2021-01-01 RX ADMIN — PROPOFOL 40 MCG/KG/MIN: 10 INJECTION, EMULSION INTRAVENOUS at 18:45

## 2021-01-01 RX ADMIN — DEXAMETHASONE SODIUM PHOSPHATE 6 MG: 10 INJECTION, SOLUTION INTRAMUSCULAR; INTRAVENOUS at 21:28

## 2021-01-01 RX ADMIN — SODIUM CHLORIDE, PRESERVATIVE FREE 10 ML: 5 INJECTION INTRAVENOUS at 08:36

## 2021-01-01 RX ADMIN — Medication 125 MCG/HR: at 02:15

## 2021-01-01 RX ADMIN — Medication 5000 UNITS: at 08:35

## 2021-01-01 RX ADMIN — FUROSEMIDE 40 MG: 10 INJECTION, SOLUTION INTRAMUSCULAR; INTRAVENOUS at 22:11

## 2021-01-01 RX ADMIN — SODIUM CHLORIDE, PRESERVATIVE FREE 10 ML: 5 INJECTION INTRAVENOUS at 20:50

## 2021-01-01 RX ADMIN — INSULIN LISPRO 4 UNITS: 100 INJECTION, SOLUTION INTRAVENOUS; SUBCUTANEOUS at 20:38

## 2021-01-01 RX ADMIN — IPRATROPIUM BROMIDE AND ALBUTEROL SULFATE 1 AMPULE: .5; 3 SOLUTION RESPIRATORY (INHALATION) at 10:25

## 2021-01-01 RX ADMIN — IPRATROPIUM BROMIDE AND ALBUTEROL SULFATE 1 AMPULE: .5; 3 SOLUTION RESPIRATORY (INHALATION) at 13:06

## 2021-01-01 RX ADMIN — HALOPERIDOL LACTATE 2 MG: 5 INJECTION, SOLUTION INTRAMUSCULAR at 19:54

## 2021-01-01 RX ADMIN — BUPROPION HYDROCHLORIDE 100 MG: 100 TABLET, FILM COATED ORAL at 21:50

## 2021-01-01 RX ADMIN — Medication 1.4 MCG/KG/HR: at 15:35

## 2021-01-01 RX ADMIN — ASCORBIC ACID 1500 MG: 500 INJECTION INTRAVENOUS at 06:26

## 2021-01-01 RX ADMIN — INSULIN GLARGINE 40 UNITS: 100 INJECTION, SOLUTION SUBCUTANEOUS at 20:48

## 2021-01-01 RX ADMIN — HYDRALAZINE HYDROCHLORIDE 10 MG: 20 INJECTION INTRAMUSCULAR; INTRAVENOUS at 01:12

## 2021-01-01 RX ADMIN — CEFEPIME HYDROCHLORIDE 2000 MG: 2 INJECTION, POWDER, FOR SOLUTION INTRAVENOUS at 21:57

## 2021-01-01 RX ADMIN — SODIUM CHLORIDE, POTASSIUM CHLORIDE, SODIUM LACTATE AND CALCIUM CHLORIDE: 600; 310; 30; 20 INJECTION, SOLUTION INTRAVENOUS at 00:58

## 2021-01-01 RX ADMIN — SODIUM CHLORIDE 9.5 UNITS/HR: 9 INJECTION, SOLUTION INTRAVENOUS at 19:24

## 2021-01-01 RX ADMIN — IPRATROPIUM BROMIDE AND ALBUTEROL SULFATE 1 AMPULE: .5; 3 SOLUTION RESPIRATORY (INHALATION) at 22:12

## 2021-01-01 RX ADMIN — ENOXAPARIN SODIUM 40 MG: 40 INJECTION SUBCUTANEOUS at 08:07

## 2021-01-01 RX ADMIN — SODIUM CHLORIDE, POTASSIUM CHLORIDE, SODIUM LACTATE AND CALCIUM CHLORIDE: 600; 310; 30; 20 INJECTION, SOLUTION INTRAVENOUS at 03:26

## 2021-01-01 RX ADMIN — Medication 300 UNITS: at 21:41

## 2021-01-01 RX ADMIN — SODIUM CHLORIDE, PRESERVATIVE FREE 10 ML: 5 INJECTION INTRAVENOUS at 08:32

## 2021-01-01 RX ADMIN — ZINC SULFATE 220 MG (50 MG) CAPSULE 50 MG: CAPSULE at 08:38

## 2021-01-01 RX ADMIN — CEFEPIME HYDROCHLORIDE 2000 MG: 2 INJECTION, POWDER, FOR SOLUTION INTRAVENOUS at 21:24

## 2021-01-01 RX ADMIN — POTASSIUM CHLORIDE 10 MEQ: 7.46 INJECTION, SOLUTION INTRAVENOUS at 07:44

## 2021-01-01 RX ADMIN — IPRATROPIUM BROMIDE AND ALBUTEROL SULFATE 1 AMPULE: .5; 3 SOLUTION RESPIRATORY (INHALATION) at 18:07

## 2021-01-01 RX ADMIN — DEXAMETHASONE SODIUM PHOSPHATE 6 MG: 10 INJECTION, SOLUTION INTRAMUSCULAR; INTRAVENOUS at 06:02

## 2021-01-01 RX ADMIN — NOREPINEPHRINE BITARTRATE 2 MCG/MIN: 1 INJECTION INTRAVENOUS at 23:35

## 2021-01-01 RX ADMIN — Medication 150 MCG/HR: at 23:40

## 2021-01-01 RX ADMIN — IVERMECTIN 19.5 MG: 3 TABLET ORAL at 19:13

## 2021-01-01 RX ADMIN — LINEZOLID 600 MG: 600 INJECTION, SOLUTION INTRAVENOUS at 12:29

## 2021-01-01 RX ADMIN — INSULIN LISPRO 6 UNITS: 100 INJECTION, SOLUTION INTRAVENOUS; SUBCUTANEOUS at 16:24

## 2021-01-01 RX ADMIN — IPRATROPIUM BROMIDE AND ALBUTEROL SULFATE 1 AMPULE: .5; 3 SOLUTION RESPIRATORY (INHALATION) at 20:00

## 2021-01-01 RX ADMIN — IPRATROPIUM BROMIDE AND ALBUTEROL SULFATE 1 AMPULE: .5; 3 SOLUTION RESPIRATORY (INHALATION) at 22:24

## 2021-01-01 RX ADMIN — AZITHROMYCIN DIHYDRATE 500 MG: 500 INJECTION, POWDER, LYOPHILIZED, FOR SOLUTION INTRAVENOUS at 18:46

## 2021-01-01 RX ADMIN — ENOXAPARIN SODIUM 90 MG: 100 INJECTION SUBCUTANEOUS at 08:40

## 2021-01-01 RX ADMIN — ASCORBIC ACID 1500 MG: 500 INJECTION INTRAVENOUS at 11:40

## 2021-01-01 RX ADMIN — ASCORBIC ACID 1500 MG: 500 INJECTION INTRAVENOUS at 18:47

## 2021-01-01 RX ADMIN — DEXTROSE AND SODIUM CHLORIDE: 5; 450 INJECTION, SOLUTION INTRAVENOUS at 10:35

## 2021-01-01 RX ADMIN — NOREPINEPHRINE BITARTRATE 2 MCG/MIN: 1 INJECTION INTRAVENOUS at 23:24

## 2021-01-01 RX ADMIN — SODIUM PHOSPHATE, MONOBASIC, MONOHYDRATE AND SODIUM PHOSPHATE, DIBASIC, ANHYDROUS 15 MMOL: 276; 142 INJECTION, SOLUTION INTRAVENOUS at 07:44

## 2021-01-01 RX ADMIN — Medication 5000 UNITS: at 08:15

## 2021-01-01 RX ADMIN — SODIUM CHLORIDE, POTASSIUM CHLORIDE, SODIUM LACTATE AND CALCIUM CHLORIDE: 600; 310; 30; 20 INJECTION, SOLUTION INTRAVENOUS at 03:44

## 2021-01-01 RX ADMIN — VITAMIN D, TAB 1000IU (100/BT) 1000 UNITS: 25 TAB at 19:32

## 2021-01-01 RX ADMIN — IPRATROPIUM BROMIDE AND ALBUTEROL SULFATE 1 AMPULE: .5; 3 SOLUTION RESPIRATORY (INHALATION) at 09:16

## 2021-01-01 RX ADMIN — SODIUM CHLORIDE, PRESERVATIVE FREE 10 ML: 5 INJECTION INTRAVENOUS at 19:58

## 2021-01-01 RX ADMIN — INSULIN GLARGINE 30 UNITS: 100 INJECTION, SOLUTION SUBCUTANEOUS at 20:22

## 2021-01-01 RX ADMIN — Medication 1.4 MCG/KG/HR: at 12:06

## 2021-01-01 RX ADMIN — Medication 150 MCG/HR: at 03:45

## 2021-01-01 RX ADMIN — Medication 300 UNITS: at 20:51

## 2021-01-01 RX ADMIN — FUROSEMIDE 20 MG: 10 INJECTION, SOLUTION INTRAMUSCULAR; INTRAVENOUS at 08:15

## 2021-01-01 RX ADMIN — PROPOFOL 5 MCG/KG/MIN: 10 INJECTION, EMULSION INTRAVENOUS at 12:24

## 2021-01-01 RX ADMIN — VANCOMYCIN HYDROCHLORIDE 1000 MG: 1 INJECTION, POWDER, LYOPHILIZED, FOR SOLUTION INTRAVENOUS at 02:56

## 2021-01-01 RX ADMIN — DEXAMETHASONE SODIUM PHOSPHATE 6 MG: 10 INJECTION, SOLUTION INTRAMUSCULAR; INTRAVENOUS at 06:23

## 2021-01-01 RX ADMIN — Medication 150 MCG/HR: at 09:27

## 2021-01-01 RX ADMIN — ENOXAPARIN SODIUM 90 MG: 100 INJECTION SUBCUTANEOUS at 20:08

## 2021-01-01 RX ADMIN — CISATRACURIUM BESYLATE 2 MCG/KG/MIN: 10 INJECTION, SOLUTION INTRAVENOUS at 00:23

## 2021-01-01 RX ADMIN — ASCORBIC ACID 1500 MG: 500 INJECTION INTRAVENOUS at 00:05

## 2021-01-01 RX ADMIN — VANCOMYCIN HYDROCHLORIDE 1000 MG: 1 INJECTION, POWDER, LYOPHILIZED, FOR SOLUTION INTRAVENOUS at 06:46

## 2021-01-01 RX ADMIN — PANTOPRAZOLE SODIUM 40 MG: 40 INJECTION, POWDER, FOR SOLUTION INTRAVENOUS at 07:58

## 2021-01-01 RX ADMIN — Medication 0.2 MCG/KG/HR: at 10:50

## 2021-01-01 RX ADMIN — DEXAMETHASONE SODIUM PHOSPHATE 6 MG: 10 INJECTION, SOLUTION INTRAMUSCULAR; INTRAVENOUS at 05:54

## 2021-01-01 RX ADMIN — HYDRALAZINE HYDROCHLORIDE 10 MG: 20 INJECTION INTRAMUSCULAR; INTRAVENOUS at 05:52

## 2021-01-01 RX ADMIN — INSULIN LISPRO 4 UNITS: 100 INJECTION, SOLUTION INTRAVENOUS; SUBCUTANEOUS at 15:33

## 2021-01-01 RX ADMIN — Medication 0.9 MCG/KG/HR: at 15:30

## 2021-01-01 RX ADMIN — SODIUM CHLORIDE, PRESERVATIVE FREE 10 ML: 5 INJECTION INTRAVENOUS at 08:18

## 2021-01-01 RX ADMIN — LEVOTHYROXINE SODIUM 175 MCG: 75 TABLET ORAL at 10:42

## 2021-01-01 RX ADMIN — FUROSEMIDE 20 MG: 10 INJECTION, SOLUTION INTRAMUSCULAR; INTRAVENOUS at 11:40

## 2021-01-01 RX ADMIN — VANCOMYCIN HYDROCHLORIDE 1000 MG: 1 INJECTION, POWDER, LYOPHILIZED, FOR SOLUTION INTRAVENOUS at 03:30

## 2021-01-01 RX ADMIN — AZITHROMYCIN DIHYDRATE 500 MG: 500 INJECTION, POWDER, LYOPHILIZED, FOR SOLUTION INTRAVENOUS at 18:06

## 2021-01-01 RX ADMIN — PROPOFOL 25 MCG/KG/MIN: 10 INJECTION, EMULSION INTRAVENOUS at 11:15

## 2021-01-01 RX ADMIN — ENOXAPARIN SODIUM 40 MG: 40 INJECTION SUBCUTANEOUS at 08:32

## 2021-01-01 RX ADMIN — PANTOPRAZOLE SODIUM 40 MG: 40 INJECTION, POWDER, FOR SOLUTION INTRAVENOUS at 08:44

## 2021-01-01 RX ADMIN — VANCOMYCIN HYDROCHLORIDE 1000 MG: 1 INJECTION, POWDER, LYOPHILIZED, FOR SOLUTION INTRAVENOUS at 19:57

## 2021-01-01 RX ADMIN — DEXTROSE AND SODIUM CHLORIDE: 5; 450 INJECTION, SOLUTION INTRAVENOUS at 04:12

## 2021-01-01 RX ADMIN — DEXAMETHASONE SODIUM PHOSPHATE 6 MG: 10 INJECTION, SOLUTION INTRAMUSCULAR; INTRAVENOUS at 06:04

## 2021-01-01 RX ADMIN — ASCORBIC ACID 1500 MG: 500 INJECTION INTRAVENOUS at 18:06

## 2021-01-01 RX ADMIN — IPRATROPIUM BROMIDE AND ALBUTEROL SULFATE 1 AMPULE: .5; 3 SOLUTION RESPIRATORY (INHALATION) at 05:05

## 2021-01-01 RX ADMIN — Medication 1.4 MCG/KG/HR: at 18:50

## 2021-01-01 RX ADMIN — BUPROPION HYDROCHLORIDE 100 MG: 100 TABLET, FILM COATED ORAL at 21:42

## 2021-01-01 RX ADMIN — Medication 1.4 MCG/KG/HR: at 14:00

## 2021-01-01 RX ADMIN — AZITHROMYCIN DIHYDRATE 500 MG: 500 INJECTION, POWDER, LYOPHILIZED, FOR SOLUTION INTRAVENOUS at 18:48

## 2021-01-01 RX ADMIN — DEXTROSE AND SODIUM CHLORIDE: 5; 450 INJECTION, SOLUTION INTRAVENOUS at 08:25

## 2021-01-01 RX ADMIN — Medication 10 ML: at 08:49

## 2021-01-01 RX ADMIN — Medication 5000 UNITS: at 08:41

## 2021-01-01 RX ADMIN — ACETAMINOPHEN 650 MG: 650 SUPPOSITORY RECTAL at 10:18

## 2021-01-01 RX ADMIN — Medication 5000 UNITS: at 08:09

## 2021-01-01 RX ADMIN — INSULIN GLARGINE 20 UNITS: 100 INJECTION, SOLUTION SUBCUTANEOUS at 21:15

## 2021-01-01 RX ADMIN — INSULIN LISPRO 2 UNITS: 100 INJECTION, SOLUTION INTRAVENOUS; SUBCUTANEOUS at 11:19

## 2021-01-01 RX ADMIN — INSULIN LISPRO 12 UNITS: 100 INJECTION, SOLUTION INTRAVENOUS; SUBCUTANEOUS at 07:59

## 2021-01-01 RX ADMIN — PROPOFOL 50 MCG/KG/MIN: 10 INJECTION, EMULSION INTRAVENOUS at 21:48

## 2021-01-01 RX ADMIN — IPRATROPIUM BROMIDE AND ALBUTEROL SULFATE 1 AMPULE: .5; 3 SOLUTION RESPIRATORY (INHALATION) at 05:14

## 2021-01-01 RX ADMIN — DEXAMETHASONE SODIUM PHOSPHATE 6 MG: 10 INJECTION, SOLUTION INTRAMUSCULAR; INTRAVENOUS at 06:22

## 2021-01-01 RX ADMIN — IPRATROPIUM BROMIDE AND ALBUTEROL SULFATE 1 AMPULE: .5; 3 SOLUTION RESPIRATORY (INHALATION) at 21:37

## 2021-01-01 RX ADMIN — CEFEPIME HYDROCHLORIDE 2000 MG: 2 INJECTION, POWDER, FOR SOLUTION INTRAVENOUS at 08:35

## 2021-01-01 RX ADMIN — ENOXAPARIN SODIUM 90 MG: 100 INJECTION SUBCUTANEOUS at 20:39

## 2021-01-01 RX ADMIN — PANTOPRAZOLE SODIUM 40 MG: 40 INJECTION, POWDER, FOR SOLUTION INTRAVENOUS at 08:32

## 2021-01-01 RX ADMIN — ENOXAPARIN SODIUM 90 MG: 100 INJECTION SUBCUTANEOUS at 08:46

## 2021-01-01 RX ADMIN — CEFEPIME HYDROCHLORIDE 2000 MG: 2 INJECTION, POWDER, FOR SOLUTION INTRAVENOUS at 08:08

## 2021-01-01 RX ADMIN — SODIUM CHLORIDE, PRESERVATIVE FREE 10 ML: 5 INJECTION INTRAVENOUS at 18:38

## 2021-01-01 RX ADMIN — PROPOFOL 35 MCG/KG/MIN: 10 INJECTION, EMULSION INTRAVENOUS at 18:12

## 2021-01-01 RX ADMIN — IVERMECTIN 19.5 MG: 3 TABLET ORAL at 12:28

## 2021-01-01 RX ADMIN — ALTEPLASE 1 MG: 2.2 INJECTION, POWDER, LYOPHILIZED, FOR SOLUTION INTRAVENOUS at 14:03

## 2021-01-01 RX ADMIN — IPRATROPIUM BROMIDE AND ALBUTEROL SULFATE 1 AMPULE: .5; 3 SOLUTION RESPIRATORY (INHALATION) at 22:10

## 2021-01-01 RX ADMIN — CISATRACURIUM BESYLATE 3 MCG/KG/MIN: 10 INJECTION, SOLUTION INTRAVENOUS at 14:07

## 2021-01-01 RX ADMIN — Medication 1.4 MCG/KG/HR: at 21:12

## 2021-01-01 RX ADMIN — DEXAMETHASONE SODIUM PHOSPHATE 6 MG: 10 INJECTION, SOLUTION INTRAMUSCULAR; INTRAVENOUS at 21:01

## 2021-01-01 RX ADMIN — POTASSIUM CHLORIDE 20 MEQ: 29.8 INJECTION, SOLUTION INTRAVENOUS at 14:03

## 2021-01-01 RX ADMIN — Medication 0.2 MCG/KG/HR: at 21:54

## 2021-01-01 RX ADMIN — TOCILIZUMAB 800 MG: 20 INJECTION, SOLUTION, CONCENTRATE INTRAVENOUS at 19:12

## 2021-01-01 RX ADMIN — IVERMECTIN 19.5 MG: 3 TABLET ORAL at 08:17

## 2021-01-01 RX ADMIN — PANTOPRAZOLE SODIUM 40 MG: 40 INJECTION, POWDER, FOR SOLUTION INTRAVENOUS at 08:15

## 2021-01-01 RX ADMIN — CEFEPIME HYDROCHLORIDE 2000 MG: 2 INJECTION, POWDER, FOR SOLUTION INTRAVENOUS at 09:33

## 2021-01-01 RX ADMIN — Medication 50 MCG/HR: at 23:16

## 2021-01-01 RX ADMIN — ENOXAPARIN SODIUM 90 MG: 100 INJECTION SUBCUTANEOUS at 20:36

## 2021-01-01 RX ADMIN — ASCORBIC ACID 1500 MG: 500 INJECTION INTRAVENOUS at 00:32

## 2021-01-01 RX ADMIN — ASCORBIC ACID 1500 MG: 500 INJECTION INTRAVENOUS at 06:04

## 2021-01-01 RX ADMIN — IPRATROPIUM BROMIDE AND ALBUTEROL SULFATE 1 AMPULE: .5; 3 SOLUTION RESPIRATORY (INHALATION) at 01:41

## 2021-01-01 RX ADMIN — ENOXAPARIN SODIUM 90 MG: 100 INJECTION SUBCUTANEOUS at 20:14

## 2021-01-01 RX ADMIN — ASCORBIC ACID 1500 MG: 500 INJECTION INTRAVENOUS at 06:22

## 2021-01-01 RX ADMIN — PANTOPRAZOLE SODIUM 40 MG: 40 INJECTION, POWDER, FOR SOLUTION INTRAVENOUS at 07:50

## 2021-01-01 RX ADMIN — MAGNESIUM SULFATE HEPTAHYDRATE 2000 MG: 40 INJECTION, SOLUTION INTRAVENOUS at 14:03

## 2021-01-01 RX ADMIN — CEFEPIME HYDROCHLORIDE 2000 MG: 2 INJECTION, POWDER, FOR SOLUTION INTRAVENOUS at 08:44

## 2021-01-01 RX ADMIN — CISATRACURIUM BESYLATE 2 MCG/KG/MIN: 10 INJECTION, SOLUTION INTRAVENOUS at 17:00

## 2021-01-01 RX ADMIN — INSULIN GLARGINE 32 UNITS: 100 INJECTION, SOLUTION SUBCUTANEOUS at 09:29

## 2021-01-01 RX ADMIN — Medication 5000 UNITS: at 08:31

## 2021-01-01 RX ADMIN — Medication 300 UNITS: at 20:37

## 2021-01-01 RX ADMIN — Medication 10 ML: at 08:34

## 2021-01-01 RX ADMIN — FENTANYL CITRATE 50 MCG: 50 INJECTION, SOLUTION INTRAMUSCULAR; INTRAVENOUS at 23:20

## 2021-01-01 RX ADMIN — IPRATROPIUM BROMIDE AND ALBUTEROL SULFATE 1 AMPULE: .5; 3 SOLUTION RESPIRATORY (INHALATION) at 05:26

## 2021-01-01 RX ADMIN — DEXAMETHASONE SODIUM PHOSPHATE 6 MG: 10 INJECTION, SOLUTION INTRAMUSCULAR; INTRAVENOUS at 05:04

## 2021-01-01 RX ADMIN — POTASSIUM CHLORIDE 10 MEQ: 7.46 INJECTION, SOLUTION INTRAVENOUS at 09:26

## 2021-01-01 RX ADMIN — ASCORBIC ACID 1500 MG: 500 INJECTION INTRAVENOUS at 12:26

## 2021-01-01 RX ADMIN — INSULIN LISPRO 2 UNITS: 100 INJECTION, SOLUTION INTRAVENOUS; SUBCUTANEOUS at 04:13

## 2021-01-01 RX ADMIN — Medication 1.4 MCG/KG/HR: at 01:05

## 2021-01-01 RX ADMIN — ASCORBIC ACID 1500 MG: 500 INJECTION INTRAVENOUS at 00:01

## 2021-01-01 RX ADMIN — ZINC SULFATE 220 MG (50 MG) CAPSULE 50 MG: CAPSULE at 19:13

## 2021-01-01 RX ADMIN — INSULIN GLARGINE 20 UNITS: 100 INJECTION, SOLUTION SUBCUTANEOUS at 07:59

## 2021-01-01 RX ADMIN — AZITHROMYCIN DIHYDRATE 500 MG: 500 INJECTION, POWDER, LYOPHILIZED, FOR SOLUTION INTRAVENOUS at 19:23

## 2021-01-01 RX ADMIN — INSULIN LISPRO 4 UNITS: 100 INJECTION, SOLUTION INTRAVENOUS; SUBCUTANEOUS at 09:30

## 2021-01-01 RX ADMIN — CISATRACURIUM BESYLATE 2 MCG/KG/MIN: 10 INJECTION, SOLUTION INTRAVENOUS at 05:31

## 2021-01-01 RX ADMIN — PROPOFOL 35 MCG/KG/MIN: 10 INJECTION, EMULSION INTRAVENOUS at 08:14

## 2021-01-01 RX ADMIN — ASCORBIC ACID 1500 MG: 500 INJECTION INTRAVENOUS at 00:28

## 2021-01-01 RX ADMIN — WATER 1000 MG: 1 INJECTION INTRAMUSCULAR; INTRAVENOUS; SUBCUTANEOUS at 14:22

## 2021-01-01 RX ADMIN — ASCORBIC ACID 1500 MG: 500 INJECTION INTRAVENOUS at 00:41

## 2021-01-01 RX ADMIN — ENOXAPARIN SODIUM 90 MG: 100 INJECTION SUBCUTANEOUS at 07:50

## 2021-01-01 RX ADMIN — Medication 100 MG: at 22:40

## 2021-01-01 RX ADMIN — IPRATROPIUM BROMIDE AND ALBUTEROL SULFATE 1 AMPULE: .5; 3 SOLUTION RESPIRATORY (INHALATION) at 09:12

## 2021-01-01 RX ADMIN — CEFEPIME HYDROCHLORIDE 2000 MG: 2 INJECTION, POWDER, FOR SOLUTION INTRAVENOUS at 20:43

## 2021-01-01 RX ADMIN — IPRATROPIUM BROMIDE AND ALBUTEROL SULFATE 1 AMPULE: .5; 3 SOLUTION RESPIRATORY (INHALATION) at 04:59

## 2021-01-01 RX ADMIN — CEFEPIME HYDROCHLORIDE 2000 MG: 2 INJECTION, POWDER, FOR SOLUTION INTRAVENOUS at 08:32

## 2021-01-01 RX ADMIN — SODIUM CHLORIDE, PRESERVATIVE FREE 10 ML: 5 INJECTION INTRAVENOUS at 22:22

## 2021-01-01 RX ADMIN — ZINC SULFATE 220 MG (50 MG) CAPSULE 50 MG: CAPSULE at 08:15

## 2021-01-01 RX ADMIN — ASCORBIC ACID 1500 MG: 500 INJECTION INTRAVENOUS at 18:53

## 2021-01-01 RX ADMIN — PROPOFOL 25 MCG/KG/MIN: 10 INJECTION, EMULSION INTRAVENOUS at 20:45

## 2021-01-01 RX ADMIN — PROPOFOL 40 MCG/KG/MIN: 10 INJECTION, EMULSION INTRAVENOUS at 16:43

## 2021-01-01 RX ADMIN — Medication 10 ML: at 20:47

## 2021-01-01 RX ADMIN — PROPOFOL 40 MCG/KG/MIN: 10 INJECTION, EMULSION INTRAVENOUS at 20:09

## 2021-01-01 RX ADMIN — NOREPINEPHRINE BITARTRATE 2 MCG/MIN: 1 INJECTION INTRAVENOUS at 05:31

## 2021-01-01 RX ADMIN — IPRATROPIUM BROMIDE AND ALBUTEROL SULFATE 1 AMPULE: .5; 3 SOLUTION RESPIRATORY (INHALATION) at 09:32

## 2021-01-01 RX ADMIN — DEXTROSE MONOHYDRATE 100 MG: 50 INJECTION, SOLUTION INTRAVENOUS at 10:28

## 2021-01-01 RX ADMIN — CEFEPIME HYDROCHLORIDE 2000 MG: 2 INJECTION, POWDER, FOR SOLUTION INTRAVENOUS at 09:07

## 2021-01-01 RX ADMIN — POTASSIUM BICARBONATE 40 MEQ: 782 TABLET, EFFERVESCENT ORAL at 10:42

## 2021-01-01 RX ADMIN — ZINC SULFATE 220 MG (50 MG) CAPSULE 50 MG: CAPSULE at 08:41

## 2021-01-01 RX ADMIN — INSULIN LISPRO 6 UNITS: 100 INJECTION, SOLUTION INTRAVENOUS; SUBCUTANEOUS at 20:32

## 2021-01-01 RX ADMIN — Medication 1.4 MCG/KG/HR: at 11:20

## 2021-01-01 RX ADMIN — SODIUM CHLORIDE, POTASSIUM CHLORIDE, SODIUM LACTATE AND CALCIUM CHLORIDE: 600; 310; 30; 20 INJECTION, SOLUTION INTRAVENOUS at 14:35

## 2021-01-01 RX ADMIN — PROPOFOL 50 MCG/KG/MIN: 10 INJECTION, EMULSION INTRAVENOUS at 10:29

## 2021-01-01 RX ADMIN — INSULIN LISPRO 2 UNITS: 100 INJECTION, SOLUTION INTRAVENOUS; SUBCUTANEOUS at 14:58

## 2021-01-01 RX ADMIN — PROPOFOL 35 MCG/KG/MIN: 10 INJECTION, EMULSION INTRAVENOUS at 03:23

## 2021-01-01 RX ADMIN — SODIUM CHLORIDE, POTASSIUM CHLORIDE, SODIUM LACTATE AND CALCIUM CHLORIDE 500 ML: 600; 310; 30; 20 INJECTION, SOLUTION INTRAVENOUS at 16:42

## 2021-01-01 RX ADMIN — ENOXAPARIN SODIUM 90 MG: 100 INJECTION SUBCUTANEOUS at 20:50

## 2021-01-01 RX ADMIN — SODIUM CHLORIDE, PRESERVATIVE FREE 10 ML: 5 INJECTION INTRAVENOUS at 09:24

## 2021-01-01 RX ADMIN — Medication 0.9 MCG/KG/HR: at 01:23

## 2021-01-01 RX ADMIN — WATER 1000 MG: 1 INJECTION INTRAMUSCULAR; INTRAVENOUS; SUBCUTANEOUS at 14:00

## 2021-01-01 RX ADMIN — ZINC SULFATE 220 MG (50 MG) CAPSULE 50 MG: CAPSULE at 08:36

## 2021-01-01 RX ADMIN — Medication 300 UNITS: at 07:56

## 2021-01-01 RX ADMIN — IPRATROPIUM BROMIDE AND ALBUTEROL SULFATE 1 AMPULE: .5; 3 SOLUTION RESPIRATORY (INHALATION) at 06:30

## 2021-01-01 RX ADMIN — INSULIN GLARGINE 15 UNITS: 100 INJECTION, SOLUTION SUBCUTANEOUS at 09:24

## 2021-01-01 RX ADMIN — VANCOMYCIN HYDROCHLORIDE 1000 MG: 1 INJECTION, POWDER, LYOPHILIZED, FOR SOLUTION INTRAVENOUS at 21:23

## 2021-01-01 RX ADMIN — Medication 10 ML: at 20:26

## 2021-01-01 RX ADMIN — Medication 1.4 MCG/KG/HR: at 08:30

## 2021-01-01 RX ADMIN — Medication 1.4 MCG/KG/HR: at 15:44

## 2021-01-01 RX ADMIN — Medication 1.4 MCG/KG/HR: at 01:53

## 2021-01-01 RX ADMIN — INSULIN LISPRO 2 UNITS: 100 INJECTION, SOLUTION INTRAVENOUS; SUBCUTANEOUS at 03:28

## 2021-01-01 RX ADMIN — IPRATROPIUM BROMIDE AND ALBUTEROL SULFATE 1 AMPULE: .5; 3 SOLUTION RESPIRATORY (INHALATION) at 17:19

## 2021-01-01 RX ADMIN — ENOXAPARIN SODIUM 90 MG: 100 INJECTION SUBCUTANEOUS at 08:10

## 2021-01-01 RX ADMIN — INSULIN GLARGINE 30 UNITS: 100 INJECTION, SOLUTION SUBCUTANEOUS at 20:49

## 2021-01-01 RX ADMIN — IPRATROPIUM BROMIDE AND ALBUTEROL SULFATE 1 AMPULE: .5; 3 SOLUTION RESPIRATORY (INHALATION) at 22:08

## 2021-01-01 RX ADMIN — Medication 1.4 MCG/KG/HR: at 16:40

## 2021-01-01 RX ADMIN — PROPOFOL 35 MCG/KG/MIN: 10 INJECTION, EMULSION INTRAVENOUS at 21:26

## 2021-01-01 RX ADMIN — PROPOFOL 40 MCG/KG/MIN: 10 INJECTION, EMULSION INTRAVENOUS at 07:05

## 2021-01-01 RX ADMIN — IPRATROPIUM BROMIDE AND ALBUTEROL SULFATE 1 AMPULE: .5; 3 SOLUTION RESPIRATORY (INHALATION) at 01:42

## 2021-01-01 RX ADMIN — IPRATROPIUM BROMIDE AND ALBUTEROL SULFATE 1 AMPULE: .5; 3 SOLUTION RESPIRATORY (INHALATION) at 16:15

## 2021-01-01 RX ADMIN — IPRATROPIUM BROMIDE AND ALBUTEROL SULFATE 1 AMPULE: .5; 3 SOLUTION RESPIRATORY (INHALATION) at 01:57

## 2021-01-01 RX ADMIN — VANCOMYCIN HYDROCHLORIDE 1000 MG: 1 INJECTION, POWDER, LYOPHILIZED, FOR SOLUTION INTRAVENOUS at 08:54

## 2021-01-01 RX ADMIN — DEXAMETHASONE SODIUM PHOSPHATE 6 MG: 10 INJECTION, SOLUTION INTRAMUSCULAR; INTRAVENOUS at 14:10

## 2021-01-01 RX ADMIN — PROPOFOL 40 MCG/KG/MIN: 10 INJECTION, EMULSION INTRAVENOUS at 13:17

## 2021-01-01 RX ADMIN — GABAPENTIN 100 MG: 250 SOLUTION ORAL at 09:08

## 2021-01-01 RX ADMIN — NOREPINEPHRINE BITARTRATE 2 MCG/MIN: 1 INJECTION INTRAVENOUS at 22:30

## 2021-01-01 RX ADMIN — IPRATROPIUM BROMIDE AND ALBUTEROL SULFATE 1 AMPULE: .5; 3 SOLUTION RESPIRATORY (INHALATION) at 14:15

## 2021-01-01 RX ADMIN — FENTANYL CITRATE 25 MCG: 50 INJECTION INTRAMUSCULAR; INTRAVENOUS at 17:39

## 2021-01-01 RX ADMIN — INSULIN GLARGINE 30 UNITS: 100 INJECTION, SOLUTION SUBCUTANEOUS at 20:30

## 2021-01-01 RX ADMIN — PROPOFOL 25 MCG/KG/MIN: 10 INJECTION, EMULSION INTRAVENOUS at 20:18

## 2021-01-01 RX ADMIN — LEVOTHYROXINE SODIUM 175 MCG: 75 TABLET ORAL at 06:58

## 2021-01-01 RX ADMIN — MIDAZOLAM HYDROCHLORIDE 5 MG: 5 INJECTION, SOLUTION INTRAMUSCULAR; INTRAVENOUS at 23:20

## 2021-01-01 RX ADMIN — SODIUM CHLORIDE, PRESERVATIVE FREE 10 ML: 5 INJECTION INTRAVENOUS at 22:21

## 2021-01-01 RX ADMIN — IVERMECTIN 19.5 MG: 3 TABLET ORAL at 07:58

## 2021-01-01 RX ADMIN — ENOXAPARIN SODIUM 90 MG: 100 INJECTION SUBCUTANEOUS at 07:57

## 2021-01-01 RX ADMIN — IPRATROPIUM BROMIDE AND ALBUTEROL SULFATE 1 AMPULE: .5; 3 SOLUTION RESPIRATORY (INHALATION) at 02:13

## 2021-01-01 ASSESSMENT — PULMONARY FUNCTION TESTS
PIF_VALUE: 38
PIF_VALUE: 25
PIF_VALUE: 37
PIF_VALUE: 33
PIF_VALUE: 26
PIF_VALUE: 32
PIF_VALUE: 35
PIF_VALUE: 36
PIF_VALUE: 23
PIF_VALUE: 30
PIF_VALUE: 38
PIF_VALUE: 27
PIF_VALUE: 33
PIF_VALUE: 41
PIF_VALUE: 33
PIF_VALUE: 45
PIF_VALUE: 33
PIF_VALUE: 22
PIF_VALUE: 20
PIF_VALUE: 33
PIF_VALUE: 31
PIF_VALUE: 30
PIF_VALUE: 33
PIF_VALUE: 19
PIF_VALUE: 32
PIF_VALUE: 33
PIF_VALUE: 39
PIF_VALUE: 31
PIF_VALUE: 31
PIF_VALUE: 22
PIF_VALUE: 32
PIF_VALUE: 33
PIF_VALUE: 40
PIF_VALUE: 37
PIF_VALUE: 34
PIF_VALUE: 34
PIF_VALUE: 33
PIF_VALUE: 36
PIF_VALUE: 37
PIF_VALUE: 21
PIF_VALUE: 21
PIF_VALUE: 19
PIF_VALUE: 18
PIF_VALUE: 32
PIF_VALUE: 33
PIF_VALUE: 21
PIF_VALUE: 21
PIF_VALUE: 32
PIF_VALUE: 14
PIF_VALUE: 36
PIF_VALUE: 20
PIF_VALUE: 35
PIF_VALUE: 38
PIF_VALUE: 32
PIF_VALUE: 21
PIF_VALUE: 33
PIF_VALUE: 34
PIF_VALUE: 40
PIF_VALUE: 33
PIF_VALUE: 33
PIF_VALUE: 27
PIF_VALUE: 31
PIF_VALUE: 35
PIF_VALUE: 21
PIF_VALUE: 33
PIF_VALUE: 30
PIF_VALUE: 31
PIF_VALUE: 35
PIF_VALUE: 35
PIF_VALUE: 33
PIF_VALUE: 34
PIF_VALUE: 29
PIF_VALUE: 31
PIF_VALUE: 32
PIF_VALUE: 46
PIF_VALUE: 28
PIF_VALUE: 38
PIF_VALUE: 51
PIF_VALUE: 35
PIF_VALUE: 31
PIF_VALUE: 34
PIF_VALUE: 37
PIF_VALUE: 33
PIF_VALUE: 31
PIF_VALUE: 34
PIF_VALUE: 34
PIF_VALUE: 35
PIF_VALUE: 37
PIF_VALUE: 34
PIF_VALUE: 33
PIF_VALUE: 31
PIF_VALUE: 33
PIF_VALUE: 30
PIF_VALUE: 30
PIF_VALUE: 34
PIF_VALUE: 38
PIF_VALUE: 33
PIF_VALUE: 32
PIF_VALUE: 33
PIF_VALUE: 32
PIF_VALUE: 32
PIF_VALUE: 33
PIF_VALUE: 38
PIF_VALUE: 38
PIF_VALUE: 31
PIF_VALUE: 21
PIF_VALUE: 30
PIF_VALUE: 35
PIF_VALUE: 33
PIF_VALUE: 39
PIF_VALUE: 36
PIF_VALUE: 32
PIF_VALUE: 34
PIF_VALUE: 23
PIF_VALUE: 38
PIF_VALUE: 36
PIF_VALUE: 27
PIF_VALUE: 22
PIF_VALUE: 33
PIF_VALUE: 50
PIF_VALUE: 30
PIF_VALUE: 33
PIF_VALUE: 29
PIF_VALUE: 31
PIF_VALUE: 38
PIF_VALUE: 32
PIF_VALUE: 36
PIF_VALUE: 37
PIF_VALUE: 23
PIF_VALUE: 32
PIF_VALUE: 34
PIF_VALUE: 34
PIF_VALUE: 35
PIF_VALUE: 37
PIF_VALUE: 34
PIF_VALUE: 31
PIF_VALUE: 39
PIF_VALUE: 18
PIF_VALUE: 31
PIF_VALUE: 38
PIF_VALUE: 31
PIF_VALUE: 25
PIF_VALUE: 33
PIF_VALUE: 39
PIF_VALUE: 38
PIF_VALUE: 33
PIF_VALUE: 27
PIF_VALUE: 22
PIF_VALUE: 38
PIF_VALUE: 33
PIF_VALUE: 31
PIF_VALUE: 24
PIF_VALUE: 33
PIF_VALUE: 32
PIF_VALUE: 42
PIF_VALUE: 27
PIF_VALUE: 35
PIF_VALUE: 36
PIF_VALUE: 32
PIF_VALUE: 33

## 2021-01-01 ASSESSMENT — PAIN SCALES - GENERAL
PAINLEVEL_OUTOF10: 0
PAINLEVEL_OUTOF10: 7
PAINLEVEL_OUTOF10: 0
PAINLEVEL_OUTOF10: 3
PAINLEVEL_OUTOF10: 1

## 2021-01-01 ASSESSMENT — ENCOUNTER SYMPTOMS
EYE REDNESS: 0
SHORTNESS OF BREATH: 1
ABDOMINAL PAIN: 0
VOMITING: 0
TACHYPNEA: 1
NAUSEA: 0

## 2021-01-01 ASSESSMENT — PAIN DESCRIPTION - PAIN TYPE: TYPE: ACUTE PAIN

## 2021-01-01 ASSESSMENT — PAIN DESCRIPTION - ORIENTATION: ORIENTATION: RIGHT

## 2021-04-12 PROBLEM — J96.90 RESPIRATORY FAILURE (HCC): Status: ACTIVE | Noted: 2021-01-01

## 2021-04-12 PROBLEM — U07.1 COVID-19: Status: ACTIVE | Noted: 2021-01-01

## 2021-04-12 PROBLEM — E11.9 TYPE 2 DIABETES MELLITUS (HCC): Status: ACTIVE | Noted: 2021-01-01

## 2021-04-12 PROBLEM — I10 HTN (HYPERTENSION): Status: ACTIVE | Noted: 2021-01-01

## 2021-04-12 NOTE — CONSULTS
Pulmonary/Critical Care Consult Note    CHIEF COMPLAINT: 19 pneumonitis, diabetes mellitus, possible diabetic ketoacidosis, dehydration    HISTORY OF PRESENT ILLNESS: Patient is a 71-year-old female with a history of diabetes mellitus who developed progressive shortness of breath and general malaise and was diagnosed with COVID-19 pneumonitis or at least COVID-19 illness on 4/1/2021. She has been home with her  who is also been tested as positive but came to the emergency room today with progressive shortness of breath and almost immediately required BiPAP ventilation using an FiO2 100%. CT scan and chest x-ray are representative of typical COVID-19 pneumonitis. The patient also had high blood sugars in the 3 and 400s when she was in the emergency room and a beta hydroxybutyrate was nearly 4. Patient also has evidence of LEIGHTON and an enlarged anion gap. She is now admitted to the intensive care for further treatment and evaluation. ALLERGY:  Patient has no known allergies. FAMILY HISTORY:  History reviewed. No pertinent family history.     SOCIAL HISTORY:  Social History     Socioeconomic History    Marital status:      Spouse name: Not on file    Number of children: Not on file    Years of education: Not on file    Highest education level: Not on file   Occupational History    Not on file   Social Needs    Financial resource strain: Not on file    Food insecurity     Worry: Not on file     Inability: Not on file    Transportation needs     Medical: Not on file     Non-medical: Not on file   Tobacco Use    Smoking status: Never Smoker    Smokeless tobacco: Never Used   Substance and Sexual Activity    Alcohol use: Not Currently    Drug use: Never    Sexual activity: Not on file   Lifestyle    Physical activity     Days per week: Not on file     Minutes per session: Not on file    Stress: Not on file   Relationships    Social connections     Talks on phone: Not on file Gets together: Not on file     Attends Latter day service: Not on file     Active member of club or organization: Not on file     Attends meetings of clubs or organizations: Not on file     Relationship status: Not on file    Intimate partner violence     Fear of current or ex partner: Not on file     Emotionally abused: Not on file     Physically abused: Not on file     Forced sexual activity: Not on file   Other Topics Concern    Not on file   Social History Narrative    Not on file       MEDICAL HISTORY:  Past Medical History:   Diagnosis Date    Diabetes mellitus ([facility] [address]     Hypertension     Irregular heart beat     Thyroid disease        MEDICATIONS:   [START ON 4/13/2021] remdesivir IVPB  100 mg Intravenous Q24H    dexamethasone  6 mg Intravenous Daily      lactated ringers       acetaminophen **OR** acetaminophen, sodium chloride    REVIEW OF SYSTEMS:  Constitutional: Denies fever, weight loss, night sweats, and fatigue  Skin: Denies pigmentation, dark lesions, and rashes   HEENT: Denies hearing loss, tinnitus, ear drainage, epistaxis, sore throat, and hoarseness. Cardiovascular: Denies palpitations, chest pain, and chest pressure. Respiratory: Positive for cough, dyspnea at rest, hemoptysis, apnea, and choking.   Gastrointestinal: Denies nausea, vomiting, poor appetite, diarrhea, heartburn or reflux  Genitourinary: Denies dysuria, frequency, urgency or hematuria  Musculoskeletal: Denies myalgias, muscle weakness, and bone pain  Neurological: Denies dizziness, vertigo, headache, and focal weakness  Psychological: Denies anxiety and depression  Endocrine: Denies heat intolerance and cold intolerance  Hematopoietic/Lymphatic: Denies bleeding problems and blood transfusions    PHYSICAL EXAM:  Vitals:    04/12/21 1606   BP: 133/68   Pulse: 84   Resp: 28   Temp: 98.6 °F (37 °C)   SpO2: 94%     FiO2 : 100 %     O2 Device: PAP (positive airway pressure)    Constitutional: No current fever, chills, diaphoresis. On presentation to the emergency room this morning, her temperature was 101.1°F  Skin: No skin rash, no skin breakdown  HEENT: Dry mucous membranes  Neck: Thick neck circumference, no JVD or lymphadenopathy  Cardiovascular: 1, S2 normal.  No S3 murmurs or rubs present  Respiratory: Fuhs inspiratory crackles over all lung fields. No obvious wheezing  Gastrointestinal: Soft, markedly obese, nontender  Genitourinary: No CVA tenderness  Extremities: No clubbing, cyanosis, or edema  Neurological: Awake, alert, oriented x3.   No evidence of focal motor or sensory deficits  Psychological: Anxious affect    LABS:  WBC   Date Value Ref Range Status   04/12/2021 17.0 (H) 4.5 - 11.5 E9/L Final     Hemoglobin   Date Value Ref Range Status   04/12/2021 9.5 (L) 11.5 - 15.5 g/dL Final     Hematocrit   Date Value Ref Range Status   04/12/2021 30.8 (L) 34.0 - 48.0 % Final     MCV   Date Value Ref Range Status   04/12/2021 98.1 80.0 - 99.9 fL Final     Platelets   Date Value Ref Range Status   04/12/2021 652 (H) 130 - 450 E9/L Final     Sodium   Date Value Ref Range Status   04/12/2021 135 132 - 146 mmol/L Final   04/12/2021 137 132 - 146 mmol/L Final     Potassium   Date Value Ref Range Status   04/12/2021 4.9 3.5 - 5.0 mmol/L Final     Potassium reflex Magnesium   Date Value Ref Range Status   04/12/2021 4.1 3.5 - 5.0 mmol/L Final     Chloride   Date Value Ref Range Status   04/12/2021 102 98 - 107 mmol/L Final   04/12/2021 101 98 - 107 mmol/L Final     CO2   Date Value Ref Range Status   04/12/2021 16 (L) 22 - 29 mmol/L Final   04/12/2021 16 (L) 22 - 29 mmol/L Final     BUN   Date Value Ref Range Status   04/12/2021 38 (H) 8 - 23 mg/dL Final   04/12/2021 36 (H) 8 - 23 mg/dL Final     CREATININE   Date Value Ref Range Status   04/12/2021 1.5 (H) 0.5 - 1.0 mg/dL Final   04/12/2021 1.5 (H) 0.5 - 1.0 mg/dL Final     Glucose   Date Value Ref Range Status   04/12/2021 454 (H) 74 - 99 mg/dL Final   04/12/2021 339 (H) 74 - 99 mg/dL Final     Calcium   Date Value Ref Range Status   04/12/2021 8.4 (L) 8.6 - 10.2 mg/dL Final   04/12/2021 8.9 8.6 - 10.2 mg/dL Final     Total Protein   Date Value Ref Range Status   04/12/2021 7.9 6.4 - 8.3 g/dL Final     Albumin   Date Value Ref Range Status   04/12/2021 3.1 (L) 3.5 - 5.2 g/dL Final     Total Bilirubin   Date Value Ref Range Status   04/12/2021 0.3 0.0 - 1.2 mg/dL Final     Alkaline Phosphatase   Date Value Ref Range Status   04/12/2021 102 35 - 104 U/L Final     AST   Date Value Ref Range Status   04/12/2021 45 (H) 0 - 31 U/L Final     ALT   Date Value Ref Range Status   04/12/2021 16 0 - 32 U/L Final     GFR Non-   Date Value Ref Range Status   04/12/2021 34 >=60 mL/min/1.73 Final     Comment:     Chronic Kidney Disease: less than 60 ml/min/1.73 sq.m. Kidney Failure: less than 15 ml/min/1.73 sq.m. Results valid for patients 18 years and older. 04/12/2021 34 >=60 mL/min/1.73 Final     Comment:     Chronic Kidney Disease: less than 60 ml/min/1.73 sq.m. Kidney Failure: less than 15 ml/min/1.73 sq.m. Results valid for patients 18 years and older. GFR    Date Value Ref Range Status   04/12/2021 41  Final   04/12/2021 41  Final     No results found for: MG  No results found for: PHOS  Recent Labs     04/12/21  0853   PH 7.396   BE -8.4*   O2SAT 97.7       RADIOLOGY:  CTA CHEST W CONTRAST   Final Result   Extensive bilateral pulmonary infiltrates may reflect pneumonia or pulmonary   edema         XR CHEST PORTABLE   Final Result   Multifocal bilateral pneumonia. XR CHEST PORTABLE    (Results Pending)   US DUP LOWER EXTREMITIES BILATERAL VENOUS    (Results Pending)       IMPRESSION:  1. Acute hypoxemic respiratory failure  2. LEIGHTON  3. Diabetic ketoacidosis  4. Dehydration  5. Morbid obesity  6. Likely obstructive sleep apnea  7. Possible superimposed bacterial pneumonia    PLAN:  1. BiPAP noninvasive ventilation  2.  IV fluids  3. Insulin drip with protocol  4. DVT prophylaxis  5. IV steroids  6. IV vitamin C  7. Vitamin D  8. Zinc gluconate  9. Dexamethasone IV  10. Remdesivir IV  11. Convalescent plasma  12. Ivermectin  13. Actemra  14. GI bleed prophylaxis with Protonix    ATTESTATION:  ICU Staff Physician note of personal involvement in Care  As the attending physician, I certify that I personally reviewed the patients history and personally examined the patient to confirm the physical findings described above,  And that I reviewed the relevant imaging studies and available reports. I also discussed the differential diagnosis and all of the proposed management plans with the patient and individuals accompanying the patient to this visit. They had the opportunity to ask questions about the proposed management plans and to have those questions answered. This patient has a high probability of sudden, clinically significant deterioration, which requires the highest level of physician preparedness to intervene urgently. I managed/supervised life or organ supporting interventions that required frequent physician assessment. I devoted my full attention to the direct care of this patient for the amount of time indicated below. Time I spent with the family or surrogate(s) is included only if the patient was incapable of providing the necessary information or participating in medical decisions - Time devoted to teaching and to any procedures I billed separately is not included.     CRITICAL CARE TIME:  43 minutes    Electronically signed by Denae Ledezma MD on 4/12/2021 at 5:58 PM

## 2021-04-12 NOTE — CONSULTS
[facility]  Consult Note    [address] [facility] [address]  [place], [address] [place]  Phone [phone]   [phone]      Admit Date: 2021  8:40 AM  Pt Name: Benjamin Bobo  MRN: [id number]  : 1950  Reason for Consult:    Chief Complaint   Patient presents with    Respiratory Distress     RECENT HX OF COVID     Requesting Physician:  Nerissa Bertrand MD  PCP: Josep Elizabeth MD  History Obtained From:  patient, chart   ID consulted for COVID-19 [U07.1]  on hospital day 0  CHIEF COMPLAINT       Chief Complaint   Patient presents with    Respiratory Distress     RECENT HX OF COVID     HISTORYOF PRESENT ILLNESS   Benjamin Bobo is a 69 y.o. female who presents with significant past medical history of  has a past medical history of COVID-19, Diabetes mellitus ([facility] [address] Hypertension, Irregular heart beat, and Thyroid disease. ED TRIAGEVITALS  BP: 133/68, Temp: 98.6 °F (37 °C), Pulse: 84, Resp: 28, SpO2: 94 %  HPI  Pt is a better historian then her   She has been sick for about a month.   They  had a + covid test at [facility] on   Her son Dayo Copeland was + now recovered/her daughter and grand son     Pt's sx was getting worse  She had progressed SOB with hypoxia  REVIEW OF SYSTEMS       REVIEW OFSYSTEMS:    CONSTITUTIONAL:   No fever, chills, weight loss  ALLERGIES:    No urticaria, hay fever,    EYES:     No blurry vision, loss of vision,eye pain  ENT:        hearing loss, sore throat dry mouth  CARDIOVASCULAR:  No chest pain or palpitations  RESPIRATORY:   No cough, sob  ENDOCRINE:    No increase thirst, urination   HEME-LYMPH:   No easy bruising or bleeding  GI:     No nausea, vomiting or diarrhea  :     No urinary complaints  NEURO:    No seizures, stroke, +HA/ear ache  MUSCULOSKELETAL:  No muscle aches or pain, no joint pain  SKIN:     No rash or itch  PSYCH:    No depression or anxiety    Medications Prior to Admission: carvedilol (COREG) 25 MG tablet, Take 12.5 mg by mouth 2 times daily  allopurinol (ZYLOPRIM) 100 MG tablet, Take 100 mg by mouth daily  choline fenofibrate (TRILIPIX) 135 MG CPDR delayed release capsule, Take 135 mg by mouth daily  buPROPion (WELLBUTRIN SR) 100 MG extended release tablet, Take 100 mg by mouth 2 times daily  pantoprazole (PROTONIX) 40 MG tablet, Take 40 mg by mouth every morning (before breakfast)  gabapentin (NEURONTIN) 300 MG capsule, Take 300 mg by mouth 3 times daily.   meclizine (ANTIVERT) 25 MG tablet, Take 25 mg by mouth 3 times daily as needed for Dizziness  levothyroxine (SYNTHROID) 175 MCG tablet, Take 175 mcg by mouth Daily  insulin detemir (LEVEMIR FLEXTOUCH) 100 UNIT/ML injection pen, Inject 54 Units into the skin nightly  acetaminophen (TYLENOL) 500 MG tablet, Take 1,500 mg by mouth every 6 hours as needed for Pain  metformin (GLUCOPHAGE-XR) 500 MG XR tablet, Take 1,000 mg by mouth daily (with breakfast)   CURRENT MEDICATIONS     Current Facility-Administered Medications:     acetaminophen (TYLENOL) tablet 650 mg, 650 mg, Oral, Q6H PRN **OR** acetaminophen (TYLENOL) suppository 650 mg, 650 mg, Rectal, Q6H PRN, Nataliia Meza DO, 650 mg at 04/12/21 1018    [COMPLETED] remdesivir 200 mg in sodium chloride 0.9 % 250 mL IVPB, 200 mg, Intravenous, Once, Stopped at 04/12/21 1311 **FOLLOWED BY** [START ON 4/13/2021] remdesivir 100 mg in sodium chloride 0.9 % 250 mL IVPB, 100 mg, Intravenous, Q24H, Nataliia Meza DO    0.9 % sodium chloride bolus, 30 mL, Intravenous, PRN, Main Bacon, DO    lactated ringers infusion, , Intravenous, Continuous, Amado Lin MD, Last Rate: 100 mL/hr at 04/12/21 1812, New Bag at 04/12/21 1812    ascorbic acid 1,500 mg in sodium chloride 0.9 % 100 mL IVPB, 1,500 mg, Intravenous, Q6H, Amado Lin MD    Vitamin D (CHOLECALCIFEROL) tablet 1,000 Units, 1,000 Units, Oral, Daily, Amado Lin MD    zinc sulfate (ZINCATE) capsule 50 mg, 50 mg, Oral, Daily, Amado Lin MD    0.9 % sodium chloride infusion, , Intravenous, PRN, Leesa Benton MD    ivermectin tablet 19.5 mg, 200 mcg/kg, Oral, Daily, Leesa Benton MD  Erika Redd  [START ON 4/13/2021] cefTRIAXone (ROCEPHIN) 1,000 mg in sterile water 10 mL IV syringe, 1,000 mg, Intravenous, Q24H, Leesa Benton MD    [facility]) 500 mg in D5W 250ml Vial Mate, 500 mg, Intravenous, Q24H, Leesa Benton MD    enoxaparin (LOVENOX) injection 40 mg, 40 mg, Subcutaneous, Daily, Leesa Benton MD, 40 mg at 04/12/21 1831    sodium chloride flush 0.9 % injection 5-40 mL, 5-40 mL, Intravenous, PRN, Dakotah Grady MD, 10 mL at 04/12/21 1841    sodium chloride flush 0.9 % injection 5-40 mL, 5-40 mL, Intravenous, BID, Caitlin Grady MD    tocilizumab (ACTEMRA) 800 mg in sodium chloride 0.9 % 100 mL IVPB, 800 mg, Intravenous, Once, Leesa Benton MD    acetaminophen (TYLENOL) tablet 650 mg, 650 mg, Oral, Once **AND** diphenhydrAMINE (BENADRYL) injection 25 mg, 25 mg, Intravenous, Once, Leesa Benton MD    EPINEPHrine PF 1 MG/ML injection 0.3 mg, 0.3 mg, Intramuscular, Once PRN **OR** methylPREDNISolone sodium (SOLU-MEDROL) injection 125 mg, 125 mg, Intravenous, Once PRN, Leesa Benton MD    dextrose 50 % IV solution, 12.5 g, Intravenous, PRN, Leesa Benton MD    potassium chloride 10 mEq/100 mL IVPB (Peripheral Line), 10 mEq, Intravenous, PRN, Leesa Benton MD    magnesium sulfate 1000 mg in dextrose 5% 100 mL IVPB, 1,000 mg, Intravenous, PRN, Leesa Benton MD    sodium phosphate 10 mmol in dextrose 5 % 250 mL IVPB, 10 mmol, Intravenous, PRN **OR** sodium phosphate 15 mmol in dextrose 5 % 250 mL IVPB, 15 mmol, Intravenous, PRN **OR** sodium phosphate 20 mmol in dextrose 5 % 500 mL IVPB, 20 mmol, Intravenous, PRN, Leesa Benton MD    insulin regular (HUMULIN R;NOVOLIN R) 100 Units in sodium chloride 0.9 % 100 mL infusion, 0.1 Units/kg/hr, Intravenous, Continuous, Leesa Benton MD    dextrose 5 % and 0.45 % sodium chloride infusion, , Intravenous, Continuous PRN, Stanley Anderson MD    pantoprazole (PROTONIX) injection 40 mg, 40 mg, Intravenous, Daily, 40 mg at 04/12/21 1841 **AND** sodium chloride (PF) 0.9 % injection 10 mL, 10 mL, Intravenous, Daily, Stanley Anderson MD, 10 mL at 04/12/21 1841    [START ON 4/13/2021] dexamethasone (PF) (DECADRON) injection 6 mg, 6 mg, Intravenous, Daily, Stanley Anderson MD  ALLERGIES     Patient has no known allergies. There is no immunization history on file for this patient. PAST MEDICAL HISTORY     Past Medical History:   Diagnosis Date    COVID-19 04/12/2021    Diabetes mellitus ([facility] [address]     Hypertension     Irregular heart beat     Thyroid disease      SURGICAL HISTORY       Past Surgical History:   Procedure Laterality Date    HYSTERECTOMY      PACEMAKER PLACEMENT       FAMILY HISTORY     History reviewed. No pertinent family history.   SOCIAL HISTORY       Social History     Socioeconomic History    Marital status:      Spouse name: None    Number of children: None    Years of education: None    Highest education level: None   Occupational History    None   Social Needs    Financial resource strain: None    Food insecurity     Worry: None     Inability: None    Transportation needs     Medical: None     Non-medical: None   Tobacco Use    Smoking status: Never Smoker    Smokeless tobacco: Never Used   Substance and Sexual Activity    Alcohol use: Not Currently    Drug use: Never    Sexual activity: None   Lifestyle    Physical activity     Days per week: None     Minutes per session: None    Stress: None   Relationships    Social connections     Talks on phone: None     Gets together: None     Attends Christianity service: None     Active member of club or organization: None     Attends meetings of clubs or organizations: None     Relationship status: None    Intimate partner violence     Fear of current or ex partner: None     Emotionally abused: None     Physically abused: None     Forced sexual activity: None   Other Topics Concern    None   Social History Narrative    None     ·       PHYSICAL EXAM       ED Triage Vitals   BP Temp Temp Source Pulse Resp SpO2 Height Weight   04/12/21 0852 04/12/21 0852 04/12/21 1019 04/12/21 0852 04/12/21 0846 04/12/21 0852 04/12/21 0852 04/12/21 0852   139/81 97.9 °F (36.6 °C) CORE 111 (!) 45 99 % 5' 2\" (1.575 m) 210 lb (95.3 kg)     Vitals:    Vitals:    04/12/21 1146 04/12/21 1308 04/12/21 1535 04/12/21 1606   BP: (!) 142/84 (!) 148/78 (!) 152/68 133/68   Pulse: 94 88 82 84   Resp: (!) 34 28 28 28   Temp:  99.3 °F (37.4 °C) 98.6 °F (37 °C) 98.6 °F (37 °C)   TempSrc:  Bladder Bladder Bladder   SpO2: 97% 98% 94% 94%   Weight:       Height:         Physical Exam   Constitutional/General: Alert and oriented, NAD inc bmi  Head: NC/AT  Eyes: PERRL, EOMI  Mouth: Normal mucosa, no thrush   Neck: Supple, full ROM,    Pulmonary: Lungs cdec  to auscultation bilaterally. Not in respiratory distress bipap  Cardiovascular:  Regular rate and rhythm, no murmurs, gallops, or rubs. Right pcer  Abdomen: Soft, + BS.   distension. Nontender. Extremities: Moves all extremities x 4.   rle red swollen no pain, Warm and well perfused  Pulses:  Distal pulses intact  Skin: Warm and dry without rash  Neurologic:    No focal deficits  Psych: Normal Affect  Left tlc   [place]      DIAGNOSTIC RESULTS   RADIOLOGY:   Xr Chest Portable    Result Date: 4/12/2021  EXAMINATION: ONE XRAY VIEW OF THE CHEST 4/12/2021 9:04 am COMPARISON: None. HISTORY: ORDERING SYSTEM PROVIDED HISTORY: shortness of breath TECHNOLOGIST PROVIDED HISTORY: Reason for exam:->shortness of breath FINDINGS: Multifocal bilateral pulmonary infiltrates are noted. There is no gross right or left pleural effusion. The heart is mildly enlarged. There is a dual lead cardiac pacer on the left. Multifocal bilateral pneumonia.      Cta Chest W Contrast    Result Date: 4/12/2021  EXAMINATION: CTA OF THE CHEST 4/12/2021 11:32 am TECHNIQUE: CTA of the chest was performed after the administration of intravenous contrast.  Multiplanar reformatted images are provided for review. MIP images are provided for review. Dose modulation, iterative reconstruction, and/or weight based adjustment of the mA/kV was utilized to reduce the radiation dose to as low as reasonably achievable. COMPARISON: None. HISTORY: ORDERING SYSTEM PROVIDED HISTORY: shortness o breath TECHNOLOGIST PROVIDED HISTORY: Reason for exam:->shortness o breath Decision Support Exception->Emergency Medical Condition (MA) FINDINGS: Pulmonary Arteries: There is limitation in evaluation for pulmonary embolus due to extensive infiltrates and motion artifact. No large central embolus detected. Mediastinum: No evidence of mediastinal lymphadenopathy. The heart and pericardium demonstrate no acute abnormality. There is no acute abnormality of the thoracic aorta. Lungs/pleura: There are extensive confluent bilateral pulmonary infiltrates which may reflect pneumonia or pulmonary edema Upper Abdomen: Limited images of the upper abdomen are unremarkable. Soft Tissues/Bones:  There is diffuse increased density within the thoracic spine and ribs likely reflecting diffuse skeletal metastasis     Extensive bilateral pulmonary infiltrates may reflect pneumonia or pulmonary edema     LABS  Recent Labs     04/12/21 0901   WBC 17.0*   HGB 9.5*   HCT 30.8*   MCV 98.1   *     Recent Labs     04/12/21  0901 04/12/21  1605    135   K 4.1 4.9    102   CO2 16* 16*   BUN 36* 38*   CREATININE 1.5* 1.5*   GFRAA 41 41   LABGLOM 34 34   GLUCOSE 339* 454*   PROT 7.9  --    LABALBU 3.1*  --    CALCIUM 8.9 8.4*   BILITOT 0.3  --    ALKPHOS 102  --    AST 45*  --    ALT 16  --      Recent Labs     04/12/21 0901   PROCAL 1.75*        Lab Results   Component Value Date    COVID19 DETECTED 04/12/2021     COVID-19/DENIZ-COV2 LABS  Recent Labs     04/12/21 0901   PROCAL 1.75*   TROPONINI

## 2021-04-12 NOTE — PROGRESS NOTES
Transported patient from ED to ICU while on V60 BiPAP. No problems encountered. I was first called at [phone] and waited till [phone], then it was decided to place TLC before sending patient upstairs. Total time was 40 minutes.

## 2021-04-12 NOTE — ED NOTES
Bed: 07  Expected date:   Expected time:   Means of arrival:   Comments:  LAISHA Grace, RN  04/12/21 0757

## 2021-04-12 NOTE — H&P
History and Physical      CHIEF COMPLAINT: sob    History of Present Illness: pt has been having sob was diagnosed with covid on 4/1/21,pt got more sob and in ed oxygen dropped to 70%,pt is now on bipap  And will be admitted to icu      Past Medical History:   Diagnosis Date    Diabetes mellitus ([address]     Hypertension     Irregular heart beat     Thyroid disease        Past Surgical History:   Procedure Laterality Date    HYSTERECTOMY         Medications Prior to Admission:    Medications Prior to Admission: carvedilol (COREG) 25 MG tablet, Take 12.5 mg by mouth 2 times daily  allopurinol (ZYLOPRIM) 100 MG tablet, Take 100 mg by mouth daily  choline fenofibrate (TRILIPIX) 135 MG CPDR delayed release capsule, Take 135 mg by mouth daily  buPROPion (WELLBUTRIN SR) 100 MG extended release tablet, Take 100 mg by mouth 2 times daily  pantoprazole (PROTONIX) 40 MG tablet, Take 40 mg by mouth every morning (before breakfast)  gabapentin (NEURONTIN) 300 MG capsule, Take 300 mg by mouth 3 times daily. meclizine (ANTIVERT) 25 MG tablet, Take 25 mg by mouth 3 times daily as needed for Dizziness  levothyroxine (SYNTHROID) 175 MCG tablet, Take 175 mcg by mouth Daily  insulin detemir (LEVEMIR FLEXTOUCH) 100 UNIT/ML injection pen, Inject 54 Units into the skin nightly  acetaminophen (TYLENOL) 500 MG tablet, Take 1,500 mg by mouth every 6 hours as needed for Pain  metformin (GLUCOPHAGE-XR) 500 MG XR tablet, Take 1,000 mg by mouth daily (with breakfast)     Allergies:    Patient has no known allergies. Social History:    reports that she has never smoked. She has never used smokeless tobacco. She reports previous alcohol use. She reports that she does not use drugs. Family History:   family history is not on file. REVIEW OF SYSTEMS:  As above in the HPI, otherwise negative.     PHYSICAL EXAM:    Vitals:  /68   Pulse 84   Temp 98.6 °F (37 °C) (Bladder)   Resp 28   Ht 5' 2\" (1.575 m)   Wt 210 lb (95.3 kg) SpO2 94%   BMI 38.41 kg/m²     General:  In acute distress on bipap. HEENT:  Normocephalic, atraumatic. Pupils equal, round, reactive to light. No scleral icterus. No conjunctival injection. Normal lips, teeth, and gums. No nasal discharge. Neck:  Supple  Heart:  RRR, no murmurs, gallops, or rubs  Lungs:  Bilateral wheezes  Abdomen: Bowel sounds present, soft, nontender, no masses, no organomegaly, no peritoneal signs  Extremities:  No clubbing, cyanosis, or edema  Skin:  Warm and dry, no open lesions or rash  Neuro:  Cranial nerves 2-12 intact, no focal deficits  Breast: deferred  Rectal: deferred  Genitalia:  deferred    LABS:  CBC:   Lab Results   Component Value Date    WBC 17.0 04/12/2021    RBC 3.14 04/12/2021    HGB 9.5 04/12/2021    HCT 30.8 04/12/2021    MCV 98.1 04/12/2021    MCH 30.3 04/12/2021    MCHC 30.8 04/12/2021    RDW 14.9 04/12/2021     04/12/2021    MPV 8.8 04/12/2021     CMP:    Lab Results   Component Value Date     04/12/2021    K 4.9 04/12/2021    K 4.1 04/12/2021     04/12/2021    CO2 16 04/12/2021    BUN 38 04/12/2021    CREATININE 1.5 04/12/2021    GFRAA 41 04/12/2021    LABGLOM 34 04/12/2021    GLUCOSE 454 04/12/2021    PROT 7.9 04/12/2021    LABALBU 3.1 04/12/2021    CALCIUM 8.4 04/12/2021    BILITOT 0.3 04/12/2021    ALKPHOS 102 04/12/2021    AST 45 04/12/2021    ALT 16 04/12/2021         ASSESSMENT:      Principal Problem:    COVID-19  Active Problems:    HTN (hypertension)    Type 2 diabetes mellitus (HCC)    Respiratory failure (HCC)  Resolved Problems:    * No resolved hospital problems.  *      PLAN:    Per orders        Please note that over 50 minutes was spent in evaluating the patient, review of records and results, discussion with staff/family, etc.      Electronically signed by Claudia Menezes MD on 4/12/2021 at 6:05 PM

## 2021-04-12 NOTE — ED NOTES
Central Line Placement Procedure Note    Indication: vascular access    Consent: The patient provided verbal consent for this procedure. Procedure: The patient was positioned appropriately and the skin over the left femoral vein was prepped with betadine and draped in a sterile fashion. Local anesthesia was obtained by infiltration using 1% Lidocaine without epinephrine. A large bore needle was used to identify the vein. A guide wire was then inserted into the vein through the needle. A triple lumen catheter was then inserted into the vessel over the guide wire using the Seldinger technique. All ports showed good, free flowing blood return and were flushed with saline solution. The catheter was then securely fastened to the skin with suture at 1 cm. Two sutures were placed into the proximal eyelets. An antibiotic disk was placed and the site was then covered with a sterile dressing. A post procedure X-ray was not indicated. The patient tolerated the procedure well.     Complications: None    Arturo Rios MD PGY-1  4/12/2021 6:05 PM         Fly Her MD  Resident  04/12/21 [id number]

## 2021-04-12 NOTE — PROGRESS NOTES
Name: Norma Amaya  : 1950  MRN: [id number]    Date: 2021    Benefits of immediately proceeding with Radiology exam outweigh the risks and therefore the following is being waived:      [] Pregnancy test    [] Protocol for Iodine allergy    [] MRI questionnaire    [x] BUN/Creatinine        Bolivar Bailey, DO

## 2021-04-13 NOTE — PROGRESS NOTES
°F (38.4 °C)  Constitutional:  The patient is awake, alert, and oriented. Skin:    Warm and dry. No rashes were noted. HEENT:   Round and reactive pupils. AT/NC   Neck:    Supple to movements. Chest:   No use of accessory muscles to breathe. Symmetrical expansion. bipap  Cardiovascular:  S1 and S2 are rhythmic and regular. No murmurs appreciated. Abdomen:   Positive bowel sounds to auscultation. Benign to palpation. Extremities:   No clubbing, no cyanosis, no edema.  Trace rle pink   CNS    lethargic   Lines: fem tlc    DIAGNOSTIC RESULTS   Radiology:  Laboratory and Tests Review:  Lab Results   Component Value Date    WBC 9.8 04/13/2021    WBC 17.0 (H) 04/12/2021    HGB 7.8 (L) 04/13/2021    HCT 24.7 (L) 04/13/2021    MCV 95.7 04/13/2021     (H) 04/13/2021     No results found for: CRPHS  Lab Results   Component Value Date    ALT 13 04/13/2021    AST 29 04/13/2021    ALKPHOS 63 04/13/2021    BILITOT <0.2 04/13/2021     Lab Results   Component Value Date     04/13/2021    K 4.0 04/13/2021    K 4.1 04/12/2021     04/13/2021    CO2 18 04/13/2021    BUN 21 04/13/2021    CREATININE 0.7 04/13/2021    CREATININE 1.4 04/13/2021    CREATININE 1.4 04/12/2021    GFRAA >60 04/13/2021    LABGLOM >60 04/13/2021    GLUCOSE 167 04/13/2021    PROT 6.9 04/13/2021    LABALBU 2.6 04/13/2021    CALCIUM 8.0 04/13/2021    BILITOT <0.2 04/13/2021    ALKPHOS 63 04/13/2021    AST 29 04/13/2021    ALT 13 04/13/2021     Lab Results   Component Value Date    CRP 31.4 (H) 04/13/2021     No results found for: SEDRATE  Recent Labs     04/12/21  0901 04/13/21  0457   CRP  --  31.4*   PROCAL 1.75*  --    FERRITIN  --  508   LDH  --  441*   TROPONINI <0.01 <0.01   DDIMER  --  771   FIBRINOGEN  --  >700*   INR  --  1.2   PROTIME  --  14.3*   AST 45* 29   ALT 16 13     No results found for: CHOL, TRIG, HDL, LDLCALC, LABVLDL  Lab Results   Component Value Date/Time    VITD25 11 (L) 04/13/2021 04:57 AM       Microbiology:

## 2021-04-13 NOTE — PROGRESS NOTES
Pharmacy Consultation Note  (Antibiotic Dosing and Monitoring)    Initial consult date:   Consulting physician: Dr Kellie Mcleod  Drug(s): Vancomycin IV  Indication: Cellulitis    Ht Readings from Last 1 Encounters:   21 5' 2\" (1.575 m)     Wt Readings from Last 1 Encounters:   21 210 lb (95.3 kg)       Age/  Gender Actual BW IBW DW  Allergy Information   69 y.o.     female 95.3 kg 50.1 kg 68.2 kg  Patient has no known allergies. Date  WBC BUN/CR UOP Drug/Dose Time   Given Level(s)   (Time) Comments     (#1) 17.0 37/1.4 -- Vancomycin 1250 mg IV x 1 2230       (#2) 9.8 34/1.2 0.7 Vancomycin 1000 mg IV Q24H <2230>  Pharmacy Consulted     (#3)            (#4)            (#5)            (#6)            (#7)            Estimated Creatinine Clearance: 46 mL/min (A) (based on SCr of 1.2 mg/dL (H)). UOP over the past 24 hours:       Intake/Output Summary (Last 24 hours) at 2021 1349  Last data filed at 2021 1241  Gross per 24 hour   Intake 5806 ml   Output 1495 ml   Net 4311 ml       Temp max: Temp (24hrs), Av.7 °F (36.5 °C), Min:96.1 °F (35.6 °C), Max:98.6 °F (37 °C)      Antibiotic Regimen:  Antibiotic Dose Date Initiated   Rocephin 1 g IV Q24H    Zithromax 500 mg IV Q24H      Cultures:  available culture and sensitivity results were reviewed in EPIC  Cultures sent and are pending.   Culture Date Result    Blood cx #1  NGTD   Blood cx #2  NGTD   Covid-19  Detected   Legionella Antigen  Negative     Assessment:  · Consulted by Dr. Kellie Mcleod to dose/monitor vancomycin  · Goal trough level:  15-20 mcg/mL  · Pt is a 70 y/o F who presented with worsening SOB with oxygen saturation reportedly 70% at EMS arrival. No supplemental oxygen at baseline  · Serum creatinine today: 1.2; CrCl ~ 46 mL/min; baseline Scr n/a    Plan:  · S/p vancomycin 1250 mg IV x 1 last night; follow with vancomycin 1000 mg IV Q24H  · Level prior to fourth dose  · Follow renal function  · Pharmacist will follow and monitor/adjust dosing as necessary      Thank you for the consult,    Mere Sorensen, PharmD 4/13/2021 1:58 PM   [phone]

## 2021-04-13 NOTE — PROGRESS NOTES
Chronic Kidney Disease: less than 60 ml/min/1.73 sq.m. Kidney Failure: less than 15 ml/min/1.73 sq.m. Results valid for patients 18 years and older. GFR    Date Value Ref Range Status   04/13/2021 53  Final   04/13/2021 See Below (AA)  Corrected     Comment:     Corrected result; previously reported as >60 on 04/13/2021 at 09:04 by I/AUT   04/13/2021 45  Final     Magnesium   Date Value Ref Range Status   04/13/2021 1.9 1.6 - 2.6 mg/dL Final   04/13/2021 See Below (AA) 1.6 - 2.6 mg/dL Corrected     Comment:     Please note: this is a corrected report. Incorrect patient; wrong  patient. Results to be removed from [facility]. Corrected result; previously reported as 2.1 on 04/13/2021 at 09:04 by I/AUT     04/13/2021 2.0 1.6 - 2.6 mg/dL Final     Phosphorus   Date Value Ref Range Status   04/13/2021 2.3 (L) 2.5 - 4.5 mg/dL Final   04/13/2021 See Below (AA) 2.5 - 4.5 mg/dL Corrected     Comment:     Please note: this is a corrected report. Incorrect patient; wrong  patient. Results to be removed from [facility]. Corrected result; previously reported as 2.1 on 04/13/2021 at 09:04 by I/AUT     04/13/2021 2.2 (L) 2.5 - 4.5 mg/dL Final     Recent Labs     04/12/21  0853 04/13/21  0507   PH 7.396  --    BE -8.4* -2.9   O2SAT 97.7 94.0       RADIOLOGY:  XR CHEST PORTABLE   Final Result   Moderate bilateral pulmonary opacities favored to be due to multifocal   pneumonia unchanged since yesterday. US DUP LOWER EXTREMITIES BILATERAL VENOUS   Final Result   No evidence of DVT in either lower extremity given the limitations detailed   above. CTA CHEST W CONTRAST   Final Result   Extensive bilateral pulmonary infiltrates may reflect pneumonia or pulmonary   edema         XR CHEST PORTABLE   Final Result   Multifocal bilateral pneumonia.                  PROBLEM LIST:  Principal Problem:    COVID-19  Active Problems:    HTN (hypertension)    Type 2 diabetes mellitus (HCC)    Respiratory failure [facility])  Resolved Problems:    * No resolved hospital problems. *      IMPRESSION:  1. Acute hypoxemic respiratory failure  2. COVID-19 pneumonitis  3. LEIGHTON-improved  4. Dehydration  5. Morbid obesity  6. Obstructive sleep apnea  7. Diabetes mellitus type 2  8. Suspect diabetic ketoacidosis    PLAN:  1. BiPAP 14/8 with FiO2 to titrate to saturation over 90%  2. Glargine insulin and coverage with short acting insulin  3. Transition to high humidity high velocity nasal cannula tomorrow so the patient can take her ivermectin and at least try to drink oral fluids  4. Continue remdesivir  5. Continue IV dexamethasone  6. Continue anti-inflammatory vitamins and minerals  7. Increase Lovenox to 40 mg twice daily    ATTESTATION:  ICU Staff Physician note of personal involvement in Care  As the attending physician, I certify that I personally reviewed the patients history and personally examined the patient to confirm the physical findings described above,  And that I reviewed the relevant imaging studies and available reports. I also discussed the differential diagnosis and all of the proposed management plans with the patient and individuals accompanying the patient to this visit. They had the opportunity to ask questions about the proposed management plans and to have those questions answered. This patient has a high probability of sudden, clinically significant deterioration, which requires the highest level of physician preparedness to intervene urgently. I managed/supervised life or organ supporting interventions that required frequent physician assessment. I devoted my full attention to the direct care of this patient for the amount of time indicated below.   Time I spent with the family or surrogate(s) is included only if the patient was incapable of providing the necessary information or participating in medical decisions - Time devoted to teaching and to any procedures I billed separately is not included.     CRITICAL CARE TIME:  36 minutes    Electronically signed by Regis Morley MD on 4/13/2021 at 2:31 PM

## 2021-04-13 NOTE — PROGRESS NOTES
Subjective:     The patient is still on bipap with fio2 95    Objective:    /64   Pulse 73   Temp 97.3 °F (36.3 °C) (Core)   Resp (!) 32   Ht 5' 2\" (1.575 m)   Wt 210 lb (95.3 kg)   SpO2 92%   BMI 38.41 kg/m²   Head and Neck: normal atraumatic, no neck masses, normal thyroid, no jvd  Heart:  regular rate and rhythm, S1, S2 normal, no murmur, click, rub or gallop  Lungs:  chest wheezing, rales, normal symmetric air entry, pulse oximetry on room air is 92%, no chest wall deformities or tenderness  Abd: soft, non-tender, without masses or organomegaly  Extrem:  No clubbing, cyanosis, or edema  Neuro:Normal, no focal neurological deficit and DTRs Hypoactive    CBC:   Lab Results   Component Value Date    WBC 9.8 04/13/2021    RBC 2.58 04/13/2021    HGB 7.8 04/13/2021    HCT 24.7 04/13/2021    MCV 95.7 04/13/2021    MCH 30.2 04/13/2021    MCHC 31.6 04/13/2021    RDW 14.9 04/13/2021     04/13/2021    MPV 8.8 04/13/2021     CMP:    Lab Results   Component Value Date     04/13/2021    K 3.9 04/13/2021    K 4.1 04/12/2021     04/13/2021    CO2 22 04/13/2021    BUN 34 04/13/2021    CREATININE 1.2 04/13/2021    GFRAA 53 04/13/2021    LABGLOM 44 04/13/2021    GLUCOSE 221 04/13/2021    PROT 6.9 04/13/2021    LABALBU 2.6 04/13/2021    CALCIUM 8.4 04/13/2021    BILITOT <0.2 04/13/2021    ALKPHOS 63 04/13/2021    AST 29 04/13/2021    ALT 13 04/13/2021        Assessment:    Patient Active Problem List   Diagnosis Code    COVID-19 U07.1    HTN (hypertension) I10    Type 2 diabetes mellitus ([facility] [address] E11.9    Respiratory failure ([facility] [address] J96.90   anemia      Plan:  Per icu team    Caitlin Grady  11:59 AM  4/13/2021

## 2021-04-13 NOTE — ACP (ADVANCE CARE PLANNING)
patient; no changes to patient's previously recorded wishes  []? New Advance Directive completed  []? Portable Do Not Rescitate prepared for Provider review and signature  []? POLST/POST/MOLST/MOST prepared for Provider review and signature        Follow-up plan:    []? Schedule follow-up conversation to continue planning  []? Referred individual to Provider for additional questions/concerns   []? Advised patient/agent/surrogate to review completed ACP document and update if needed with changes in condition, patient preferences or care setting     [x]?  This note routed to one or more involved healthcare providers     Electronically signed by Shagufta Barrera RN-BC on 4/13/2021 at 2:03 PM

## 2021-04-13 NOTE — PLAN OF CARE
Problem: Non-Violent Restraints  Goal: No harm/injury to patient while restraints in use  4/13/2021 0151 by Yoana Cali RN  Outcome: Met This Shift  4/12/2021 2133 by Manda Yusuf RN  Outcome: Met This Shift     Problem: Non-Violent Restraints  Goal: Patient's dignity will be maintained  4/13/2021 0151 by Yoana Cali RN  Outcome: Met This Shift  4/12/2021 2133 by Manda Yusuf RN  Outcome: Met This Shift     Problem: Non-Violent Restraints  Goal: Removal from restraints as soon as assessed to be safe  4/13/2021 0151 by Yoana Cali RN  Outcome: Not Met This Shift  4/12/2021 2133 by Manda Yusuf RN  Outcome: Not Met This Shift

## 2021-04-13 NOTE — PLAN OF CARE
Problem: Airway Clearance - Ineffective  Goal: Achieve or maintain patent airway  Outcome: Met This Shift     Problem: Gas Exchange - Impaired  Goal: Absence of hypoxia  Outcome: Met This Shift  Goal: Promote optimal lung function  Outcome: Met This Shift     Problem: Breathing Pattern - Ineffective  Goal: Ability to achieve and maintain a regular respiratory rate  Outcome: Met This Shift     Problem:  Body Temperature -  Risk of, Imbalanced  Goal: Ability to maintain a body temperature within defined limits  Outcome: Completed  Goal: Will regain or maintain usual level of consciousness  Outcome: Completed  Goal: Complications related to the disease process, condition or treatment will be avoided or minimized  Outcome: Completed     Problem: Isolation Precautions - Risk of Spread of Infection  Goal: Prevent transmission of infection  Outcome: Met This Shift     Problem: Nutrition Deficits  Goal: Optimize nutritional status  Outcome: Not Met This Shift     Problem: Risk for Fluid Volume Deficit  Goal: Maintain normal heart rhythm  Outcome: Completed  Goal: Maintain absence of muscle cramping  Outcome: Completed  Goal: Maintain normal serum potassium, sodium, calcium, phosphorus, and pH  Outcome: Completed     Problem: Falls - Risk of:  Goal: Will remain free from falls  Description: Will remain free from falls  Outcome: Met This Shift  Goal: Absence of physical injury  Description: Absence of physical injury  Outcome: Met This Shift     Problem: Skin Integrity:  Goal: Will show no infection signs and symptoms  Description: Will show no infection signs and symptoms  Outcome: Met This Shift  Goal: Absence of new skin breakdown  Description: Absence of new skin breakdown  Outcome: Met This Shift     Problem: Non-Violent Restraints  Goal: Removal from restraints as soon as assessed to be safe  Outcome: Not Met This Shift  Goal: No harm/injury to patient while restraints in use  Outcome: Met This Shift  Goal: Patient's dignity will be maintained  Outcome: Met This Shift

## 2021-04-13 NOTE — CARE COORDINATION
Advance Care Planning     Advance Care Planning Activator (Inpatient)  Conversation Note      Date of ACP Conversation: 4/12/2021    [employer] with: Son Yuan Duran      ACP Activator: Jignesh 44 Decision Maker:     Current Designated Health Care Decision Maker: spouse Jose Angel Jules is on the 6th floor per son Manjit Meza. Attempted to reach  Jose Angel Jules- voice mail messages full unable to leave a message. Called son- listed secondary nok. Primary Decision Maker: Hay Justice - Spouse - [phone]    Secondary Decision Maker: Anselmo Costa Child - [phone]         Care Preferences    Ventilation: \"If you were in your present state of health and suddenly became very ill and were unable to breathe on your own, what would your preference be about the use of a ventilator (breathing machine) if it were available to you? \"      Would the patient desire the use of ventilator (breathing machine)?: yes    \"If your health worsens and it becomes clear that your chance of recovery is unlikely, what would your preference be about the use of a ventilator (breathing machine) if it were available to you? \"     Would the patient desire the use of ventilator (breathing machine)?: Yes      Resuscitation  \"CPR works best to restart the heart when there is a sudden event, like a heart attack, in someone who is otherwise healthy. Unfortunately, CPR does not typically restart the heart for people who have serious health conditions or who are very sick. \"    \"In the event your heart stopped as a result of an underlying serious health condition, would you want attempts to be made to restart your heart ? Yes     [] Yes   [] No   Educated Patient / Aminah [place] regarding differences between Advance Directives and portable DNR orders.     Length of ACP Conversation in minutes:      Conversation Outcomes:  [x] ACP discussion completed  [] Existing advance directive reviewed with patient; no changes to

## 2021-04-14 NOTE — PROGRESS NOTES
Subjective:     The patient is still on bipap on fio2 of 90    Objective:    BP (!) 140/81   Pulse 71   Temp 97 °F (36.1 °C) (Core)   Resp 28   Ht 5' 2\" (1.575 m)   Wt 215 lb (97.5 kg)   SpO2 95%   BMI 39.32 kg/m²   Head and Neck: normal atraumatic, no neck masses, normal thyroid, no jvd  Heart:  regular rate and rhythm, S1, S2 normal, no murmur, click, rub or gallop  Lungs:  chest  wheezing,no rales, normal symmetric air entry, pulse oximetry on fio2 of 90 is 95%, no chest wall deformities or tenderness  Abd: soft, non-tender, without masses or organomegaly  Extrem:  edema  Neuro:Normal, no focal neurological deficit and DTRs Hypoactive    CBC:   Lab Results   Component Value Date    WBC 13.5 04/14/2021    RBC 2.73 04/14/2021    HGB 8.5 04/14/2021    HCT 26.7 04/14/2021    MCV 97.8 04/14/2021    MCH 31.1 04/14/2021    MCHC 31.8 04/14/2021    RDW 15.3 04/14/2021     04/14/2021    MPV 9.0 04/14/2021     CMP:    Lab Results   Component Value Date     04/14/2021    K 4.4 04/14/2021    K 4.4 04/14/2021     04/14/2021    CO2 22 04/14/2021    BUN 29 04/14/2021    CREATININE 1.1 04/14/2021    GFRAA 59 04/14/2021    LABGLOM 49 04/14/2021    GLUCOSE 318 04/14/2021    PROT 6.4 04/14/2021    LABALBU 2.6 04/14/2021    CALCIUM 8.4 04/14/2021    BILITOT <0.2 04/14/2021    ALKPHOS 66 04/14/2021    AST 32 04/14/2021    ALT 12 04/14/2021        Assessment:    Patient Active Problem List   Diagnosis Code    COVID-19 U07.1    HTN (hypertension) I10    Type 2 diabetes mellitus ([facility] [address] E11.9    Respiratory failure ([facility] [address] J96.90         Plan:  Per icu team  Increase lantus to 20 units bid    Caitlin Grady  6:00 PM  4/14/2021

## 2021-04-14 NOTE — PROGRESS NOTES
[facility]  [facility]  Progress Note    NAME: Francesco Tyson  MR:  [id number]  :   1950  DATE OF SERVICE:21    This is a face to face encounter with Francesco Tyson 69 y.o. female on 21    CHIEF COMPLAINT     ID following for   Chief Complaint   Patient presents with    Respiratory Distress     RECENT HX OF COVID     HISTORYOF PRESENT ILLNESS   Pt in icu   On bipap   She is comfortable   has no c/o   Patient is tolerating medications. No reported adverse drug reactions. REVIEW OF SYSTEMS     As stated above in the chief complaint, otherwise negative.   CURRENT MEDICATIONS     Scheduled Meds:   insulin lispro  0-12 Units Subcutaneous Q6H    Vitamin D  5,000 Units Oral Daily    insulin glargine  15 Units Subcutaneous BID    enoxaparin  40 mg Subcutaneous BID    vancomycin  1,000 mg Intravenous Q24H    remdesivir IVPB  100 mg Intravenous Q24H    ascorbic acid  1,500 mg Intravenous Q6H    zinc sulfate  50 mg Oral Daily    ivermectin  200 mcg/kg Oral Daily    cefTRIAXone (ROCEPHIN) IV  1,000 mg Intravenous Q24H    azithromycin  500 mg Intravenous Q24H    sodium chloride flush  5-40 mL Intravenous BID    pantoprazole  40 mg Intravenous Daily    And    sodium chloride (PF)  10 mL Intravenous Daily    dexamethasone  6 mg Intravenous Daily     Continuous Infusions:   lactated ringers 75 mL/hr at 21 0058    dextrose      dexmedetomidine HCl in NaCl 0.4 mcg/kg/hr (21 1125)     PRN Meds:polyvinyl alcohol, glucose, dextrose, glucagon (rDNA), dextrose, acetaminophen **OR** acetaminophen, sodium chloride, sodium chloride flush, dextrose, trimethobenzamide    PHYSICAL EXAM     BP (!) 150/83   Pulse 67   Temp 97.1 °F (36.2 °C)   Resp 26   Ht 5' 2\" (1.575 m)   Wt 215 lb (97.5 kg)   SpO2 96%   BMI 39.32 kg/m²   Temp  Av.8 °F (36.6 °C)  Min: 97.1 °F (36.2 °C)  Max: 98.4 °F (36.9 °C)  Constitutional:  The patient is arousable   Skin:    Warm and dry. HEENT:    AT/NC   Chest:   No use of accessory muscles to breathe. Symmetrical expansion. bipap  Cardiovascular:  S1 and S2 are rhythmic and regular. Abdomen:   Positive bowel sounds to auscultation. Benign to palpation. Extremities:    no edema.  Trace rle pink   CNS    lethargic   Lines: fem tlc 4/12  [place]     DIAGNOSTIC RESULTS   Radiology:  Laboratory and Tests Review:  Lab Results   Component Value Date    WBC 13.5 (H) 04/14/2021    WBC 9.8 04/13/2021    WBC 17.0 (H) 04/12/2021    HGB 8.5 (L) 04/14/2021    HCT 26.7 (L) 04/14/2021    MCV 97.8 04/14/2021     (H) 04/14/2021     No results found for: CRPHS  Lab Results   Component Value Date    ALT 12 04/14/2021    AST 32 (H) 04/14/2021    ALKPHOS 66 04/14/2021    BILITOT <0.2 04/14/2021     Lab Results   Component Value Date     04/14/2021    K 4.4 04/14/2021    K 4.4 04/14/2021     04/14/2021    CO2 22 04/14/2021    BUN 29 04/14/2021    CREATININE 1.1 04/14/2021    CREATININE 1.2 04/13/2021    CREATININE See Below 04/13/2021    GFRAA 59 04/14/2021    LABGLOM 49 04/14/2021    GLUCOSE 318 04/14/2021    PROT 6.4 04/14/2021    LABALBU 2.6 04/14/2021    CALCIUM 8.4 04/14/2021    BILITOT <0.2 04/14/2021    ALKPHOS 66 04/14/2021    AST 32 04/14/2021    ALT 12 04/14/2021     Lab Results   Component Value Date    CRP 14.9 (H) 04/14/2021    CRP 31.4 (H) 04/13/2021     No results found for: SEDRATE  Recent Labs     04/12/21  0901 04/13/21  0457 04/14/21  0455   CRP  --  31.4* 14.9*   PROCAL 1.75*  --   --    FERRITIN  --  508  --    LDH  --  441*  --    TROPONINI <0.01 <0.01  --    DDIMER  --  771 971   FIBRINOGEN  --  >700*  --    INR  --  1.2  --    PROTIME  --  14.3*  --    AST 45* 29 32*   ALT 16 13 12     No results found for: CHOL, TRIG, HDL, LDLCALC, LABVLDL  Lab Results   Component Value Date/Time    VITD25 11 (L) 04/13/2021 04:57 AM       Microbiology:   Recent Labs     04/12/21  0901   COVID19 DETECTED*         FINAL IMPRESSION Leukocytosis/fevers  sepsis  covid pneumonia severe with poss bacterial pneumonia   -s/p TOCI  RLEcellulitis better  Hyperglycemia   Vitamin D deficiency  -inc dose    pennie better cr1.1        Encourage side postioning      enoxaparin (LOVENOX) injection 40 mg, BID  vancomycin (VANCOCIN) 1,000 mg in dextrose 5 % 250 mL IVPB, Q24H  remdesivir 100 mg in sodium chloride 0.9 % 250 mL IVPB, Q24H  ivermectin tablet 19.5 mg, Daily  cefTRIAXone (ROCEPHIN) 1,000 mg in sterile water 10 mL IV syringe, Q24H  azithromycin (ZITHROMAX) 500 mg in D5W 250ml Vial Mate, Q24H         · Monitor labs    Imaging and labs were reviewed per medical records. The patient was educated about the diagnosis, prognosis, indications, risks and benefits of treatment. An opportunity to ask questions was given to the patient/FAMILY. Thank you for involving me in the care of Mitra Leelee will continue to follow. Please do not hesitate to call for any questions or concerns.     Electronically signed by Bg Zaidi MD on 4/14/2021 at 12:59 PM

## 2021-04-14 NOTE — PROGRESS NOTES
Pulmonary/Critical Care Progress Note    We are following patient for COVID-19 pneumonitis, acute hypoxemic respiratory failure, diabetes mellitus, diabetic ketoacidosis, dehydration-resolved    SUBJECTIVE:  Patient is comfortable on BiPAP with an FiO2 of 0.90 and pressures of 14/8. She is still not able to come off BiPAP without desaturating markedly. We will try not to intubate her if at all possible. Blood pressure is slightly elevated and may require better control. Also, we are unable to give her ivermectin while she is on BiPAP. She is tolerating other medications such as multiple antibiotics remdesivir and insulin without difficulty. The patient has become somewhat hypertensive today and will require treatment; will add hydralazine at appropriate doses and intervals. She remains appropriate and responsive to verbal prompts and stimuli.       MEDICATIONS:   insulin lispro  0-12 Units Subcutaneous Q6H    Vitamin D  5,000 Units Oral Daily    insulin glargine  15 Units Subcutaneous BID    enoxaparin  40 mg Subcutaneous BID    vancomycin  1,000 mg Intravenous Q24H    remdesivir IVPB  100 mg Intravenous Q24H    ascorbic acid  1,500 mg Intravenous Q6H    zinc sulfate  50 mg Oral Daily    ivermectin  200 mcg/kg Oral Daily    cefTRIAXone (ROCEPHIN) IV  1,000 mg Intravenous Q24H    azithromycin  500 mg Intravenous Q24H    sodium chloride flush  5-40 mL Intravenous BID    pantoprazole  40 mg Intravenous Daily    And    sodium chloride (PF)  10 mL Intravenous Daily    dexamethasone  6 mg Intravenous Daily      lactated ringers 75 mL/hr at 04/14/21 0058    dextrose      dexmedetomidine HCl in NaCl 0.4 mcg/kg/hr (04/14/21 1125)     polyvinyl alcohol, glucose, dextrose, glucagon (rDNA), dextrose, acetaminophen **OR** acetaminophen, sodium chloride, sodium chloride flush, dextrose, trimethobenzamide      REVIEW OF SYSTEMS:  Constitutional: Denies fever, weight loss, night sweats, and fatigue  Skin: Denies pigmentation, dark lesions, and rashes   HEENT: Denies hearing loss, tinnitus, ear drainage, epistaxis, sore throat, and hoarseness. Cardiovascular: Denies palpitations, chest pain, and chest pressure. Respiratory: Denies cough, dyspnea at rest, hemoptysis, apnea, and choking. Gastrointestinal: Denies nausea, vomiting, poor appetite, diarrhea, heartburn or reflux  Genitourinary: Denies dysuria, frequency, urgency or hematuria  Musculoskeletal: Denies myalgias, muscle weakness, and bone pain  Neurological: Denies dizziness, vertigo, headache, and focal weakness  Psychological: Denies anxiety and depression  Endocrine: Denies heat intolerance and cold intolerance  Hematopoietic/Lymphatic: Denies bleeding problems and blood transfusions    OBJECTIVE:  Vitals:    04/14/21 1326   BP:    Pulse: 64   Resp: 29   Temp:    SpO2: 96%     FiO2 : 90 %     O2 Device: PAP (positive airway pressure)    PHYSICAL EXAM:  Constitutional: No fever, chills, diaphoresis  Skin: No skin rash, no skin breakdown  HEENT: Unremarkable  Neck: No JVD, lymphadenopathy, thyromegaly  Cardiovascular: S1, S2 normal.  No S3 murmurs rubs present  Respiratory: Inspiratory crackles over both posterior lung fields without wheezes  Gastrointestinal: Soft, obese, nontender  Genitourinary: No grossly bloody urine  Extremities: No clubbing, cyanosis, or edema  Neurological: Awake, alert, oriented x3. Moves all extremities.   No obvious focal deficits  Psychological: Seems to have appropriate affect    LABS:  WBC   Date Value Ref Range Status   04/14/2021 13.5 (H) 4.5 - 11.5 E9/L Final   04/13/2021 9.8 4.5 - 11.5 E9/L Final   04/12/2021 17.0 (H) 4.5 - 11.5 E9/L Final     Hemoglobin   Date Value Ref Range Status   04/14/2021 8.5 (L) 11.5 - 15.5 g/dL Final   04/13/2021 7.8 (L) 11.5 - 15.5 g/dL Final   04/12/2021 9.5 (L) 11.5 - 15.5 g/dL Final     Hematocrit   Date Value Ref Range Status   04/14/2021 26.7 (L) 34.0 - 48.0 % Final   04/13/2021 24.7 (L) 34.0 - 48.0 % Final   04/12/2021 30.8 (L) 34.0 - 48.0 % Final     MCV   Date Value Ref Range Status   04/14/2021 97.8 80.0 - 99.9 fL Final   04/13/2021 95.7 80.0 - 99.9 fL Final   04/12/2021 98.1 80.0 - 99.9 fL Final     Platelets   Date Value Ref Range Status   04/14/2021 644 (H) 130 - 450 E9/L Final   04/13/2021 541 (H) 130 - 450 E9/L Final   04/12/2021 652 (H) 130 - 450 E9/L Final     Sodium   Date Value Ref Range Status   04/14/2021 138 132 - 146 mmol/L Final   04/13/2021 139 132 - 146 mmol/L Final   04/13/2021 See Below (AA) 132 - 146 mmol/L Corrected     Comment:     Please note: this is a corrected report. Incorrect patient; wrong  patient. Results to be removed from [facility].   Corrected result; previously reported as 134 on 04/13/2021 at 09:04 by I/AUT       Potassium   Date Value Ref Range Status   04/14/2021 4.4 3.5 - 5.0 mmol/L Final   04/13/2021 3.9 3.5 - 5.0 mmol/L Final   04/13/2021 See Below (AA) 3.5 - 5.0 mmol/L Corrected     Comment:     Corrected result; previously reported as 4.0 on 04/13/2021 at 09:04 by I/AUT     Potassium reflex Magnesium   Date Value Ref Range Status   04/14/2021 4.4 3.5 - 5.0 mmol/L Final   04/12/2021 4.1 3.5 - 5.0 mmol/L Final     Chloride   Date Value Ref Range Status   04/14/2021 105 98 - 107 mmol/L Final   04/13/2021 107 98 - 107 mmol/L Final   04/13/2021 See Below (AA) 98 - 107 mmol/L Corrected     Comment:     Corrected result; previously reported as 104 on 04/13/2021 at 09:04 by I/AUT     CO2   Date Value Ref Range Status   04/14/2021 22 22 - 29 mmol/L Final   04/13/2021 22 22 - 29 mmol/L Final   04/13/2021 See Below (AA) 22 - 29 mmol/L Corrected     Comment:     Corrected result; previously reported as 18 on 04/13/2021 at 09:04 by I/AUT     BUN   Date Value Ref Range Status   04/14/2021 29 (H) 8 - 23 mg/dL Final   04/13/2021 34 (H) 8 - 23 mg/dL Final   04/13/2021 See Below (AA) 8 - 23 mg/dL Corrected     Comment:     Corrected result; previously reported as 21 on 04/13/2021 at 09:04 by I/AUT     CREATININE   Date Value Ref Range Status   04/14/2021 1.1 (H) 0.5 - 1.0 mg/dL Final   04/13/2021 1.2 (H) 0.5 - 1.0 mg/dL Final   04/13/2021 See Below (AA) 0.5 - 1.0 mg/dL Corrected     Comment:     Corrected result; previously reported as 0.7 on 04/13/2021 at 09:04 by I/AUT     Glucose   Date Value Ref Range Status   04/14/2021 318 (H) 74 - 99 mg/dL Final   04/13/2021 221 (H) 74 - 99 mg/dL Final   04/13/2021 See Below (AA) 74 - 99 mg/dL Corrected     Comment:     Corrected result; previously reported as 167 on 04/13/2021 at 09:04 by I/AUT     Calcium   Date Value Ref Range Status   04/14/2021 8.4 (L) 8.6 - 10.2 mg/dL Final   04/13/2021 8.4 (L) 8.6 - 10.2 mg/dL Final   04/13/2021 See Below (AA) 8.6 - 10.2 mg/dL Corrected     Comment:     Corrected result; previously reported as 8.0 on 04/13/2021 at 09:04 by I/AUT     Total Protein   Date Value Ref Range Status   04/14/2021 6.4 6.4 - 8.3 g/dL Final   04/13/2021 6.9 6.4 - 8.3 g/dL Final   04/12/2021 7.9 6.4 - 8.3 g/dL Final     Albumin   Date Value Ref Range Status   04/14/2021 2.6 (L) 3.5 - 5.2 g/dL Final   04/13/2021 2.6 (L) 3.5 - 5.2 g/dL Final   04/12/2021 3.1 (L) 3.5 - 5.2 g/dL Final     Total Bilirubin   Date Value Ref Range Status   04/14/2021 <0.2 0.0 - 1.2 mg/dL Final   04/13/2021 <0.2 0.0 - 1.2 mg/dL Final   04/12/2021 0.3 0.0 - 1.2 mg/dL Final     Alkaline Phosphatase   Date Value Ref Range Status   04/14/2021 66 35 - 104 U/L Final   04/13/2021 63 35 - 104 U/L Final   04/12/2021 102 35 - 104 U/L Final     AST   Date Value Ref Range Status   04/14/2021 32 (H) 0 - 31 U/L Final   04/13/2021 29 0 - 31 U/L Final     Comment:     Specimen is slightly Hemolyzed. Result may be artificially increased.    04/12/2021 45 (H) 0 - 31 U/L Final     ALT   Date Value Ref Range Status   04/14/2021 12 0 - 32 U/L Final   04/13/2021 13 0 - 32 U/L Final   04/12/2021 16 0 - 32 U/L Final     GFR Non-   Date Value Ref Range Status 04/14/2021 49 >=60 mL/min/1.73 Final     Comment:     Chronic Kidney Disease: less than 60 ml/min/1.73 sq.m. Kidney Failure: less than 15 ml/min/1.73 sq.m. Results valid for patients 18 years and older. 04/13/2021 44 >=60 mL/min/1.73 Final     Comment:     Chronic Kidney Disease: less than 60 ml/min/1.73 sq.m. Kidney Failure: less than 15 ml/min/1.73 sq.m. Results valid for patients 18 years and older. 04/13/2021 See Below (AA) >=60 mL/min/1.73 Corrected     Comment:     Corrected result; previously reported as >60 on 04/13/2021 at 09:04 by I/AUT  Chronic Kidney Disease: less than 60 ml/min/1.73 sq.m. Kidney Failure: less than 15 ml/min/1.73 sq.m. Results valid for patients 18 years and older. GFR    Date Value Ref Range Status   04/14/2021 59  Final   04/13/2021 53  Final   04/13/2021 See Below (AA)  Corrected     Comment:     Corrected result; previously reported as >60 on 04/13/2021 at 09:04 by I/AUT     Magnesium   Date Value Ref Range Status   04/14/2021 2.0 1.6 - 2.6 mg/dL Final   04/13/2021 1.9 1.6 - 2.6 mg/dL Final   04/13/2021 See Below (AA) 1.6 - 2.6 mg/dL Corrected     Comment:     Please note: this is a corrected report. Incorrect patient; wrong  patient. Results to be removed from [facility]. Corrected result; previously reported as 2.1 on 04/13/2021 at 09:04 by I/AUT       Phosphorus   Date Value Ref Range Status   04/14/2021 2.5 2.5 - 4.5 mg/dL Final   04/13/2021 2.3 (L) 2.5 - 4.5 mg/dL Final   04/13/2021 See Below (AA) 2.5 - 4.5 mg/dL Corrected     Comment:     Please note: this is a corrected report. Incorrect patient; wrong  patient. Results to be removed from [facility].   Corrected result; previously reported as 2.1 on 04/13/2021 at 09:04 by I/AUT       Recent Labs     04/14/21  0505   PH 7.407   PO2 74.3*   PCO2 36.1   HCO3 22.2   BE -2.1   O2SAT 94.3       RADIOLOGY:  XR CHEST PORTABLE   Final Result   Moderate bilateral pulmonary opacities favored to be due to multifocal   pneumonia unchanged since yesterday. US DUP LOWER EXTREMITIES BILATERAL VENOUS   Final Result   No evidence of DVT in either lower extremity given the limitations detailed   above. CTA CHEST W CONTRAST   Final Result   Extensive bilateral pulmonary infiltrates may reflect pneumonia or pulmonary   edema         XR CHEST PORTABLE   Final Result   Multifocal bilateral pneumonia. XR CHEST PORTABLE    (Results Pending)           PROBLEM LIST:  Principal Problem:    COVID-19  Active Problems:    HTN (hypertension)    Type 2 diabetes mellitus ([address]    Respiratory failure ([address]  Resolved Problems:    * No resolved hospital problems. *      IMPRESSION:  1. Acute hypoxemic respiratory failure  2. COVID-19 pneumonitis  3. Diabetes mellitus, probably type II  4. Suspect obstructive sleep apnea  5. Morbid obesity  6. Dehydration, resolved  7. Systemic hypertension    PLAN:  1. Continue on BiPAP until the morning. Hopefully, we will be able to change her over to air Vo or Vapotherm high humidity/high velocity oxygen generators  2. Continue Lantus insulin with coverage  3. Start ivermectin if able to go to high flow nasal cannula  4. Continue IV dexamethasone and other anti-inflammatory measures  5. Continue Lovenox 40 mg twice daily but watch for further increases in D-dimer levels  6. Continue remdesivir to completion    ATTESTATION:  ICU Staff Physician note of personal involvement in Care  As the attending physician, I certify that I personally reviewed the patients history and personally examined the patient to confirm the physical findings described above,  And that I reviewed the relevant imaging studies and available reports. I also discussed the differential diagnosis and all of the proposed management plans with the patient and individuals accompanying the patient to this visit.   They had the opportunity to ask questions about the proposed management plans and to have those questions answered. This patient has a high probability of sudden, clinically significant deterioration, which requires the highest level of physician preparedness to intervene urgently. I managed/supervised life or organ supporting interventions that required frequent physician assessment. I devoted my full attention to the direct care of this patient for the amount of time indicated below. Time I spent with the family or surrogate(s) is included only if the patient was incapable of providing the necessary information or participating in medical decisions - Time devoted to teaching and to any procedures I billed separately is not included.     CRITICAL CARE TIME:  38 minutes    Electronically signed by Sola Scott MD on 4/14/2021 at 1:51 PM

## 2021-04-14 NOTE — PROGRESS NOTES
Pharmacy Consultation Note  (Antibiotic Dosing and Monitoring)    Initial consult date:   Consulting physician: Dr Paige Dalal  Drug(s): Vancomycin IV  Indication: Cellulitis    Ht Readings from Last 1 Encounters:   21 5' 2\" (1.575 m)     Wt Readings from Last 1 Encounters:   21 215 lb (97.5 kg)       Age/  Gender Actual BW IBW DW  Allergy Information   69 y.o.     female 95.3 kg 50.1 kg 68.2 kg  Patient has no known allergies. Date  WBC BUN/CR UOP Drug/Dose Time   Given Level(s)   (Time) Comments     (#1) 17.0 37/1.4 -- Vancomycin 1250 mg IV x 1 2230       (#2) 9.8 34/1.2 0.7 Vancomycin 1000 mg IV Q24H 2203  Pharmacy Consulted     (#3) 13.5 29/1.1 0.6 Vancomycin 1000 mg IV Q24H <2230>       (#4)            (#5)            (#6)            (#7)            Estimated Creatinine Clearance: 51 mL/min (A) (based on SCr of 1.1 mg/dL (H)). UOP over the past 24 hours:       Intake/Output Summary (Last 24 hours) at 2021 1206  Last data filed at 2021 1120  Gross per 24 hour   Intake 4581 ml   Output 1425 ml   Net 3156 ml       Temp max: Temp (24hrs), Av °F (36.7 °C), Min:97.2 °F (36.2 °C), Max:98.4 °F (36.9 °C)      Antibiotic Regimen:  Antibiotic Dose Date Initiated   Rocephin 1 g IV Q24H    Zithromax 500 mg IV Q24H      Cultures:  available culture and sensitivity results were reviewed in EPIC  Cultures sent and are pending.   Culture Date Result    Blood cx #1  NGTD   Blood cx #2  NGTD   Covid-19  Detected   Legionella Antigen  Negative     Assessment:  · Consulted by Dr. Paige Dalal to dose/monitor vancomycin  · Goal trough level:  15-20 mcg/mL  · Pt is a 70 y/o F who presented with worsening SOB with oxygen saturation reportedly 70% at EMS arrival. No supplemental oxygen at baseline  · Serum creatinine today: 1.1; CrCl ~ 51 mL/min; baseline Scr n/a    Plan:  · Vancomycin 1000 mg IV Q24H  · Level prior to fourth dose  · Follow renal

## 2021-04-14 NOTE — CARE COORDINATION
4/14/21 Positive covid 4/13/21. CM transition of care: icu/isolation/continuous bipap- pt is a full code- need code status orders. ACP planning completed with son see notes. Pts family positive covid, except her son Jomar Haider,  Brenden Jones is on the 6th floor with covid. Therapies when pt can participate for discharge planning needs. CM/SS following.  Electronically signed by Radha Briscoe RN-BC on 4/14/2021 at 9:30 AM

## 2021-04-15 NOTE — PROGRESS NOTES
epistaxis, sore throat, and hoarseness. Cardiovascular: Denies palpitations, chest pain, and chest pressure. Respiratory: Denies cough, dyspnea at rest, hemoptysis, apnea, and choking. Gastrointestinal: Denies nausea, vomiting, poor appetite, diarrhea, heartburn or reflux  Genitourinary: Denies dysuria, frequency, urgency or hematuria  Musculoskeletal: Denies myalgias, muscle weakness, and bone pain  Neurological: Denies dizziness, vertigo, headache, and focal weakness  Psychological: Denies anxiety and depression  Endocrine: Denies heat intolerance and cold intolerance  Hematopoietic/Lymphatic: Denies bleeding problems and blood transfusions    OBJECTIVE:  Vitals:    04/15/21 1300   BP: 118/72   Pulse: 66   Resp: (!) 38   Temp:    SpO2: 97%     FiO2 : (S) 80 %     O2 Device: PAP (positive airway pressure)    PHYSICAL EXAM:  Constitutional: No fever, chills, diaphoresis  Skin: No skin rash, no skin breakdown  HEENT: Unremarkable  Neck: No JVD, lymphadenopathy, thyromegaly  Cardiovascular: S1, S2 regular. No S3 murmurs rubs present  Respiratory: Pittore crackles over both posterior lung fields  Gastrointestinal: Soft, obese, nontender  Genitourinary: No CVA tenderness  Extremities: No clubbing, cyanosis, or edema  Neurological: Awake, alert, oriented x3.   No evidence of focal motor or sensory deficits  Psychological: Quite anxious but otherwise intact    LABS:  WBC   Date Value Ref Range Status   04/15/2021 10.6 4.5 - 11.5 E9/L Final   04/14/2021 13.5 (H) 4.5 - 11.5 E9/L Final   04/13/2021 9.8 4.5 - 11.5 E9/L Final     Hemoglobin   Date Value Ref Range Status   04/15/2021 9.1 (L) 11.5 - 15.5 g/dL Final   04/14/2021 8.5 (L) 11.5 - 15.5 g/dL Final   04/13/2021 7.8 (L) 11.5 - 15.5 g/dL Final     Hematocrit   Date Value Ref Range Status   04/15/2021 28.6 (L) 34.0 - 48.0 % Final   04/14/2021 26.7 (L) 34.0 - 48.0 % Final   04/13/2021 24.7 (L) 34.0 - 48.0 % Final     MCV   Date Value Ref Range Status   04/15/2021 95.3 80.0 - 99.9 fL Final   04/14/2021 97.8 80.0 - 99.9 fL Final   04/13/2021 95.7 80.0 - 99.9 fL Final     Platelets   Date Value Ref Range Status   04/15/2021 644 (H) 130 - 450 E9/L Final   04/14/2021 644 (H) 130 - 450 E9/L Final   04/13/2021 541 (H) 130 - 450 E9/L Final     Sodium   Date Value Ref Range Status   04/15/2021 140 132 - 146 mmol/L Final   04/14/2021 138 132 - 146 mmol/L Final   04/13/2021 139 132 - 146 mmol/L Final     Potassium   Date Value Ref Range Status   04/15/2021 3.8 3.5 - 5.0 mmol/L Final   04/14/2021 4.4 3.5 - 5.0 mmol/L Final   04/13/2021 3.9 3.5 - 5.0 mmol/L Final     Potassium reflex Magnesium   Date Value Ref Range Status   04/15/2021 3.8 3.5 - 5.0 mmol/L Final   04/14/2021 4.4 3.5 - 5.0 mmol/L Final   04/12/2021 4.1 3.5 - 5.0 mmol/L Final     Chloride   Date Value Ref Range Status   04/15/2021 107 98 - 107 mmol/L Final   04/14/2021 105 98 - 107 mmol/L Final   04/13/2021 107 98 - 107 mmol/L Final     CO2   Date Value Ref Range Status   04/15/2021 23 22 - 29 mmol/L Final   04/14/2021 22 22 - 29 mmol/L Final   04/13/2021 22 22 - 29 mmol/L Final     BUN   Date Value Ref Range Status   04/15/2021 28 (H) 8 - 23 mg/dL Final   04/14/2021 29 (H) 8 - 23 mg/dL Final   04/13/2021 34 (H) 8 - 23 mg/dL Final     CREATININE   Date Value Ref Range Status   04/15/2021 1.0 0.5 - 1.0 mg/dL Final   04/14/2021 1.1 (H) 0.5 - 1.0 mg/dL Final   04/13/2021 1.2 (H) 0.5 - 1.0 mg/dL Final     Glucose   Date Value Ref Range Status   04/15/2021 272 (H) 74 - 99 mg/dL Final   04/14/2021 318 (H) 74 - 99 mg/dL Final   04/13/2021 221 (H) 74 - 99 mg/dL Final     Calcium   Date Value Ref Range Status   04/15/2021 8.4 (L) 8.6 - 10.2 mg/dL Final   04/14/2021 8.4 (L) 8.6 - 10.2 mg/dL Final   04/13/2021 8.4 (L) 8.6 - 10.2 mg/dL Final     Total Protein   Date Value Ref Range Status   04/15/2021 6.1 (L) 6.4 - 8.3 g/dL Final   04/14/2021 6.4 6.4 - 8.3 g/dL Final   04/13/2021 6.9 6.4 - 8.3 g/dL Final     Albumin   Date Value Ref Range Status   04/15/2021 2.5 (L) 3.5 - 5.2 g/dL Final   04/14/2021 2.6 (L) 3.5 - 5.2 g/dL Final   04/13/2021 2.6 (L) 3.5 - 5.2 g/dL Final     Total Bilirubin   Date Value Ref Range Status   04/15/2021 <0.2 0.0 - 1.2 mg/dL Final   04/14/2021 <0.2 0.0 - 1.2 mg/dL Final   04/13/2021 <0.2 0.0 - 1.2 mg/dL Final     Alkaline Phosphatase   Date Value Ref Range Status   04/15/2021 82 35 - 104 U/L Final   04/14/2021 66 35 - 104 U/L Final   04/13/2021 63 35 - 104 U/L Final     AST   Date Value Ref Range Status   04/15/2021 29 0 - 31 U/L Final   04/14/2021 32 (H) 0 - 31 U/L Final   04/13/2021 29 0 - 31 U/L Final     Comment:     Specimen is slightly Hemolyzed. Result may be artificially increased. ALT   Date Value Ref Range Status   04/15/2021 11 0 - 32 U/L Final   04/14/2021 12 0 - 32 U/L Final   04/13/2021 13 0 - 32 U/L Final     GFR Non-   Date Value Ref Range Status   04/15/2021 55 >=60 mL/min/1.73 Final     Comment:     Chronic Kidney Disease: less than 60 ml/min/1.73 sq.m. Kidney Failure: less than 15 ml/min/1.73 sq.m. Results valid for patients 18 years and older. 04/14/2021 49 >=60 mL/min/1.73 Final     Comment:     Chronic Kidney Disease: less than 60 ml/min/1.73 sq.m. Kidney Failure: less than 15 ml/min/1.73 sq.m. Results valid for patients 18 years and older. 04/13/2021 44 >=60 mL/min/1.73 Final     Comment:     Chronic Kidney Disease: less than 60 ml/min/1.73 sq.m. Kidney Failure: less than 15 ml/min/1.73 sq.m. Results valid for patients 18 years and older.        GFR    Date Value Ref Range Status   04/15/2021 >60  Final   04/14/2021 59  Final   04/13/2021 53  Final     Magnesium   Date Value Ref Range Status   04/15/2021 1.8 1.6 - 2.6 mg/dL Final   04/14/2021 2.0 1.6 - 2.6 mg/dL Final   04/13/2021 1.9 1.6 - 2.6 mg/dL Final     Phosphorus   Date Value Ref Range Status   04/15/2021 1.7 (L) 2.5 - 4.5 mg/dL Final   04/14/2021 2.5 2.5 - 4.5 mg/dL Final   04/13/2021 2.3 (L) 2.5 - 4.5 mg/dL Final     Recent Labs     04/14/21  0505   PH 7.407   PO2 74.3*   PCO2 36.1   HCO3 22.2   BE -2.1   O2SAT 94.3       RADIOLOGY:  XR CHEST PORTABLE   Final Result   1. There is no interval change in extensive multifocal bilateral airspace   disease. XR CHEST PORTABLE   Final Result   Moderate bilateral pulmonary opacities favored to be due to multifocal   pneumonia unchanged since yesterday. US DUP LOWER EXTREMITIES BILATERAL VENOUS   Final Result   No evidence of DVT in either lower extremity given the limitations detailed   above. CTA CHEST W CONTRAST   Final Result   Extensive bilateral pulmonary infiltrates may reflect pneumonia or pulmonary   edema         XR CHEST PORTABLE   Final Result   Multifocal bilateral pneumonia. PROBLEM LIST:  Principal Problem:    COVID-19  Active Problems:    HTN (hypertension)    Type 2 diabetes mellitus ([address]    Respiratory failure ([address]  Resolved Problems:    * No resolved hospital problems. *      IMPRESSION:  1. COVID-19 pneumonitis  2. Acute hypoxemic respiratory failure  3. Diabetes mellitus type 2  4. Suspect obstructive sleep apnea  5. Morbid obesity  6. Systemic hypertension  7. Must rule out systolic and/or diastolic dysfunction    PLAN:  1. Echocardiogram-today if possible  2. Try to transition to high humidity, high velocity nasal cannula, at least for several hours per day  3. Begin oral ivermectin if able to do the above  4. Continue Lovenox twice daily  5. Continue IV dexamethasone daily  6. Complete 4-day course of remdesivir today  7. Continue Lantus insulin with coverage  8.   Echocardiogram today  ATTESTATION:  ICU Staff Physician note of personal involvement in Care  As the attending physician, I certify that I personally reviewed the patients history and personally examined the patient to confirm the physical findings described above,  And that I reviewed the relevant imaging studies and available reports. I also discussed the differential diagnosis and all of the proposed management plans with the patient and individuals accompanying the patient to this visit. They had the opportunity to ask questions about the proposed management plans and to have those questions answered. This patient has a high probability of sudden, clinically significant deterioration, which requires the highest level of physician preparedness to intervene urgently. I managed/supervised life or organ supporting interventions that required frequent physician assessment. I devoted my full attention to the direct care of this patient for the amount of time indicated below. Time I spent with the family or surrogate(s) is included only if the patient was incapable of providing the necessary information or participating in medical decisions - Time devoted to teaching and to any procedures I billed separately is not included.     CRITICAL CARE TIME:  35 minutes    Electronically signed by Regis Morley MD on 4/15/2021 at 1:41 PM

## 2021-04-15 NOTE — CARE COORDINATION
4/15/21 Positive covid 4/13/21. CM transition of care: icu/precedex gtt/bipap at 80%/isolation. Code status obtained. pts  admitted to hospital with covid as well. Will need therapies to assist with discharge direction of care. Watch for iv antibiotics and oxygen needs when closer to discharge. CM/SS following.  Electronically signed by ELEAZAR Gurrola on 4/15/2021 at 1:33 PM

## 2021-04-15 NOTE — PROGRESS NOTES
[facility]  [facility]  Progress Note    NAME: Loreto Owen  MR:  [id number]  :   1950  DATE OF SERVICE:04/15/21    This is a face to face encounter with Loreto Owen 69 y.o. female on 04/15/21    CHIEF COMPLAINT     ID following for   Chief Complaint   Patient presents with    Respiratory Distress     RECENT HX OF COVID     HISTORYOF PRESENT ILLNESS   Pt in icu   On bipap 70%  She is comfortable   has no c/o   Patient is tolerating medications. No reported adverse drug reactions. REVIEW OF SYSTEMS     As stated above in the chief complaint, otherwise negative.   CURRENT MEDICATIONS     Scheduled Meds:   insulin lispro  0-12 Units Subcutaneous Q6H    insulin glargine  20 Units Subcutaneous BID    Vitamin D  5,000 Units Oral Daily    enoxaparin  40 mg Subcutaneous BID    vancomycin  1,000 mg Intravenous Q24H    remdesivir IVPB  100 mg Intravenous Q24H    ascorbic acid  1,500 mg Intravenous Q6H    zinc sulfate  50 mg Oral Daily    ivermectin  200 mcg/kg Oral Daily    cefTRIAXone (ROCEPHIN) IV  1,000 mg Intravenous Q24H    azithromycin  500 mg Intravenous Q24H    sodium chloride flush  5-40 mL Intravenous BID    pantoprazole  40 mg Intravenous Daily    And    sodium chloride (PF)  10 mL Intravenous Daily    dexamethasone  6 mg Intravenous Daily     Continuous Infusions:   lactated ringers 75 mL/hr at 04/15/21 0344    dextrose      dexmedetomidine HCl in NaCl 1 mcg/kg/hr (04/15/21 0430)     PRN Meds:hydrALAZINE, polyvinyl alcohol, glucose, dextrose, glucagon (rDNA), dextrose, acetaminophen **OR** acetaminophen, sodium chloride, sodium chloride flush, dextrose, trimethobenzamide    PHYSICAL EXAM     /73   Pulse 58   Temp 96.4 °F (35.8 °C) (Core)   Resp 25   Ht 5' 2\" (1.575 m)   Wt 208 lb (94.3 kg)   SpO2 98%   BMI 38.04 kg/m²   Temp  Av.1 °F (36.2 °C)  Min: 96.4 °F (35.8 °C)  Max: 97.5 °F (36.4 °C)  Constitutional:  The patient is awake  Skin:    Warm and dry. HEENT:    AT/NC   Chest:   No use of accessory muscles to breathe. Symmetrical expansion. bipap  Cardiovascular:  S1 and S2 are rhythmic and regular. Abdomen:   Positive bowel sounds to auscultation. Benign to palpation. Distended  Extremities:    no edema.    CNS    More awake  Lines: fem tlc 4/12  [place] yellow     DIAGNOSTIC RESULTS   Radiology:  Laboratory and Tests Review:  Lab Results   Component Value Date    WBC 10.6 04/15/2021    WBC 13.5 (H) 04/14/2021    WBC 9.8 04/13/2021    HGB 9.1 (L) 04/15/2021    HCT 28.6 (L) 04/15/2021    MCV 95.3 04/15/2021     (H) 04/15/2021     No results found for: CRPHS  Lab Results   Component Value Date    ALT 11 04/15/2021    AST 29 04/15/2021    ALKPHOS 82 04/15/2021    BILITOT <0.2 04/15/2021     Lab Results   Component Value Date     04/15/2021    K 3.8 04/15/2021    K 3.8 04/15/2021     04/15/2021    CO2 23 04/15/2021    BUN 28 04/15/2021    CREATININE 1.0 04/15/2021    CREATININE 1.1 04/14/2021    CREATININE 1.2 04/13/2021    GFRAA >60 04/15/2021    LABGLOM 55 04/15/2021    GLUCOSE 272 04/15/2021    PROT 6.1 04/15/2021    LABALBU 2.5 04/15/2021    CALCIUM 8.4 04/15/2021    BILITOT <0.2 04/15/2021    ALKPHOS 82 04/15/2021    AST 29 04/15/2021    ALT 11 04/15/2021     Lab Results   Component Value Date    CRP 14.9 (H) 04/14/2021    CRP 31.4 (H) 04/13/2021     No results found for: SEDRATE  Recent Labs     04/12/21  0901 04/13/21  0457 04/14/21  0455 04/15/21  0541   CRP  --  31.4* 14.9*  --    PROCAL 1.75*  --   --   --    FERRITIN  --  508  --   --    LDH  --  441*  --   --    TROPONINI <0.01 <0.01  --   --    DDIMER  --     FIBRINOGEN  --  >700*  --   --    INR  --  1.2  --   --    PROTIME  --  14.3*  --   --    AST 45* 29 32* 29   ALT 16 13 12 11     No results found for: CHOL, TRIG, HDL, LDLCALC, LABVLDL  Lab Results   Component Value Date/Time    VITD25 11 (L) 04/13/2021 04:57 AM       Microbiology: Recent Labs     04/12/21  0901   COVID19 DETECTED*         FINAL IMPRESSION    Leukocytosis/fevers currently afebrile  sepsis  covid pneumonia severe with poss bacterial pneumonia   -s/p TOCI  RLE cellulitis better  Vitamin D deficiency  pennie better cr1.1        Encourage side postioning      enoxaparin (LOVENOX) injection 40 mg, BID  vancomycin (VANCOCIN) 1,000 mg in dextrose 5 % 250 mL IVPB, Q24H  remdesivir 100 mg in sodium chloride 0.9 % 250 mL IVPB, Q24H  ivermectin tablet 19.5 mg, Daily  cefTRIAXone (ROCEPHIN) 1,000 mg in sterile water 10 mL IV syringe, Q24H  azithromycin (ZITHROMAX) 500 mg in D5W 250ml Vial Mate, Q24H         · Monitor labs    Imaging and labs were reviewed per medical records.         Electronically signed by Katie Krishna MD on 4/15/2021 at 8:51 AM

## 2021-04-15 NOTE — PROGRESS NOTES
Subjective:     The patient is still on 70%,still on bipap    Objective:    /79   Pulse 59   Temp 96.3 °F (35.7 °C) (Core)   Resp 27   Ht 5' 2\" (1.575 m)   Wt 208 lb (94.3 kg)   SpO2 93%   BMI 38.04 kg/m²   Head and Neck: normal atraumatic, no neck masses, normal thyroid, no jvd  Heart:  regular rate and rhythm, S1, S2 normal, no murmur, click, rub or gallop  Lungs:  chest clear, no wheezing, rales, normal symmetric air entry, pulse oximetry on 70 is 93%, no chest wall deformities or tenderness  Abd: soft, non-tender, without masses or organomegaly  Extrem:  edema  Neuro:Normal, no focal neurological deficit and DTRs Hypoactive    CBC:   Lab Results   Component Value Date    WBC 10.6 04/15/2021    RBC 3.00 04/15/2021    HGB 9.1 04/15/2021    HCT 28.6 04/15/2021    MCV 95.3 04/15/2021    MCH 30.3 04/15/2021    MCHC 31.8 04/15/2021    RDW 15.3 04/15/2021     04/15/2021    MPV 8.8 04/15/2021     CMP:    Lab Results   Component Value Date     04/15/2021    K 3.8 04/15/2021    K 3.8 04/15/2021     04/15/2021    CO2 23 04/15/2021    BUN 28 04/15/2021    CREATININE 1.0 04/15/2021    GFRAA >60 04/15/2021    LABGLOM 55 04/15/2021    GLUCOSE 272 04/15/2021    PROT 6.1 04/15/2021    LABALBU 2.5 04/15/2021    CALCIUM 8.4 04/15/2021    BILITOT <0.2 04/15/2021    ALKPHOS 82 04/15/2021    AST 29 04/15/2021    ALT 11 04/15/2021        Assessment:    Patient Active Problem List   Diagnosis Code    COVID-19 U07.1    HTN (hypertension) I10    Type 2 diabetes mellitus ([facility] [address] E11.9    Respiratory failure ([facility] [address] J96.90         Plan:  Per icu team    Caitlin Grady  3:12 PM  4/15/2021

## 2021-04-15 NOTE — PROGRESS NOTES
Pharmacy Consultation Note  (Antibiotic Dosing and Monitoring)    Initial consult date:   Consulting physician: Dr Rian Parsons  Drug(s): Vancomycin IV  Indication: Cellulitis    Ht Readings from Last 1 Encounters:   21 5' 2\" (1.575 m)     Wt Readings from Last 1 Encounters:   04/15/21 208 lb (94.3 kg)       Age/  Gender Actual BW IBW DW  Allergy Information   69 y.o.     female 95.3 kg 50.1 kg 68.2 kg  Patient has no known allergies. Date  WBC BUN/CR UOP Drug/Dose Time   Given Level(s)   (Time) Comments     (#1) 17.0 37/1.4 -- Vancomycin 1250 mg IV x 1 2230       (#2) 9.8 34/1.2 0.7 Vancomycin 1000 mg IV Q24H 2203  Pharmacy Consulted     (#3) 13.5 29/1.1 0.6 Vancomycin 1000 mg IV Q24H 3     4/15  (#4) 10.6 28/1.0 0.8 Vancomycin 1000 mg IV Q24H <2230> Trough @ 2200 Please hold dose if level is >20 mcg/mL     (#5)            (#6)            (#7)            Estimated Creatinine Clearance: 55 mL/min (based on SCr of 1 mg/dL). UOP over the past 24 hours:       Intake/Output Summary (Last 24 hours) at 4/15/2021 1154  Last data filed at 4/15/2021 0602  Gross per 24 hour   Intake 2541.37 ml   Output 1825 ml   Net 716.37 ml       Temp max: Temp (24hrs), Av.1 °F (36.2 °C), Min:96.4 °F (35.8 °C), Max:97.5 °F (36.4 °C)      Antibiotic Regimen:  Antibiotic Dose Date Initiated   Rocephin 1 g IV Q24H    Zithromax 500 mg IV Q24H      Cultures:  available culture and sensitivity results were reviewed in EPIC  Cultures sent and are pending.   Culture Date Result    Blood cx #1  NGTD   Blood cx #2  NGTD   Covid-19  Detected   Legionella Antigen  Negative     Assessment:  · Consulted by Dr. Vickye  to dose/monitor vancomycin  · Goal trough level:  15-20 mcg/mL  · Pt is a 68 y/o F who presented with worsening SOB with oxygen saturation reportedly 70% at EMS arrival. No supplemental oxygen at baseline  · Serum creatinine today: 1.0; CrCl ~ 55 mL/min; baseline Scr n/a    Plan:  · Vancomycin 1000 mg IV Q24H  · Trough tonight @ 2200; Please hold dose if level is >20 mcg/mL  · Follow renal function  · Pharmacist will follow and monitor/adjust dosing as necessary      Thank you for the consult,    Grzegorz May, PharmD 4/15/2021 11:54 AM   [phone]

## 2021-04-15 NOTE — PROGRESS NOTES
Comprehensive Nutrition Assessment    Type and Reason for Visit:  Initial, RD Nutrition Re-Screen/LOS    Nutrition Recommendations/Plan: Monitor Nutrition Progression. Consult for TF if needed. Nutrition Assessment:  Pt at nutritional compromise AEB NPO x4days 2/2 admits w/ COVID 19 PNA and on BiPAP, H/o DM , Morbid Obsesity and HTN. Will monitor nutrition progression. Consult for Tube Feeding if needed. Malnutrition Assessment:  Malnutrition Status: At risk for malnutrition (Comment)    Context:  Acute Illness     Findings of the 6 clinical characteristics of malnutrition:  Energy Intake:     Weight Loss:  Unable to assess(STEPAHNIE wt status d/t fluid status, COVID+)     Body Fat Loss:  No significant body fat loss     Muscle Mass Loss:  No significant muscle mass loss    Fluid Accumulation:  No significant fluid accumulation     Strength:  Not Performed    Estimated Daily Nutrient Needs:  Energy (kcal):  ; Weight Used for Energy Requirements:  Current     Protein (g):  (x1.8-2.0gm/kg);  Weight Used for Protein Requirements:  Ideal        Fluid (ml/day):  per critical care; Method Used for Fluid Requirements:         Nutrition Related Findings:  Alert, Continous BiPAP, missing teeth,Abd round, Hypoactive, +BS, +I/O +6.4L, BUE +1 non-pitting edema, BGL elevated      Wounds:  None       Current Nutrition Therapies:    Diet NPO Effective Now    Anthropometric Measures:  · Height: 5' 2\" (157.5 cm)  · Current Body Weight: 208 lb (94.3 kg)(4/15)     · Usual Body Weight: (No weight hx)     · Ideal Body Weight: 110 lbs; % Ideal Body Weight 189.1 %   · BMI: 38  · Adjusted Body Weight:  ; No Adjustment    · BMI Categories: Obese Class 2 (BMI 35.0 -39.9)       Nutrition Diagnosis:   · Inadequate energy intake related to impaired respiratory function as evidenced by NPO or clear liquid status due to medical condition      Nutrition Interventions:   Food and/or Nutrient Delivery:  Continue NPO(Consult for TF as needed)  Nutrition Education/Counseling:  Education not indicated   Coordination of Nutrition Care:  Continue to monitor while inpatient    Goals:  Nutrition Progression       Nutrition Monitoring and Evaluation:   Behavioral-Environmental Outcomes:  None Identified   Food/Nutrient Intake Outcomes:  Diet Advancement/Tolerance  Physical Signs/Symptoms Outcomes:  Biochemical Data, Chewing or Swallowing, Fluid Status or Edema, Hemodynamic Status, Nutrition Focused Physical Findings, Skin, Weight     Discharge Planning:     Too soon to determine     Electronically signed by Darrion Wright RD, LD on 4/15/21 at 3:11 PM EDT    Contact: [phone]

## 2021-04-16 NOTE — CARE COORDINATION
4/16/21 Positive covid 4/13/21. Cm transition of care: Icu/vent 4/15/21/sedation/paralytic-prone/pressor. pts  is hospitalized at [facility], as well. Therapies when pt can participate. CM/SS to continue to follow.  Electronically signed by ELEAZAR Edouard on 4/16/2021 at 11:45 AM

## 2021-04-16 NOTE — PLAN OF CARE
Problem: Non-Violent Restraints  Goal: No harm/injury to patient while restraints in use  Outcome: Met This Shift  Goal: Patient's dignity will be maintained  Outcome: Met This Shift     Problem: Inadequate energy intake (NI-1.4)  Goal: Food and/or Nutrient Delivery  Description: Individualized approach for food/nutrient provision. 4/15/2021 1455 by Massiel Mcdonald RD, SAVITA  Outcome: Ongoing     Problem: Inadequate energy intake (NI-1.4)  Goal: Food and/or Nutrient Delivery  Description: Individualized approach for food/nutrient provision.   4/15/2021 1455 by Massiel Mcdonald RD, SAVITA  Outcome: Ongoing     Problem: Non-Violent Restraints  Goal: Removal from restraints as soon as assessed to be safe  Outcome: Not Met This Shift

## 2021-04-16 NOTE — PROGRESS NOTES
Patient's son Levert Boxer was notified that RRT called and patient will be intubated per recommendation of intensivist. Patient's son is understanding and in agreement.

## 2021-04-16 NOTE — PROGRESS NOTES
450 E9/L Final   04/14/2021 644 (H) 130 - 450 E9/L Final     Sodium   Date Value Ref Range Status   04/16/2021 138 132 - 146 mmol/L Final   04/15/2021 140 132 - 146 mmol/L Final   04/14/2021 138 132 - 146 mmol/L Final     Potassium   Date Value Ref Range Status   04/15/2021 3.8 3.5 - 5.0 mmol/L Final   04/14/2021 4.4 3.5 - 5.0 mmol/L Final     Potassium reflex Magnesium   Date Value Ref Range Status   04/16/2021 3.8 3.5 - 5.0 mmol/L Final   04/15/2021 3.8 3.5 - 5.0 mmol/L Final   04/14/2021 4.4 3.5 - 5.0 mmol/L Final     Chloride   Date Value Ref Range Status   04/16/2021 106 98 - 107 mmol/L Final   04/15/2021 107 98 - 107 mmol/L Final   04/14/2021 105 98 - 107 mmol/L Final     CO2   Date Value Ref Range Status   04/16/2021 20 (L) 22 - 29 mmol/L Final   04/15/2021 23 22 - 29 mmol/L Final   04/14/2021 22 22 - 29 mmol/L Final     BUN   Date Value Ref Range Status   04/16/2021 28 (H) 8 - 23 mg/dL Final   04/15/2021 28 (H) 8 - 23 mg/dL Final   04/14/2021 29 (H) 8 - 23 mg/dL Final     CREATININE   Date Value Ref Range Status   04/16/2021 0.9 0.5 - 1.0 mg/dL Final   04/15/2021 1.0 0.5 - 1.0 mg/dL Final   04/14/2021 1.1 (H) 0.5 - 1.0 mg/dL Final     Glucose   Date Value Ref Range Status   04/16/2021 244 (H) 74 - 99 mg/dL Final   04/15/2021 272 (H) 74 - 99 mg/dL Final   04/14/2021 318 (H) 74 - 99 mg/dL Final     Calcium   Date Value Ref Range Status   04/16/2021 8.1 (L) 8.6 - 10.2 mg/dL Final   04/15/2021 8.4 (L) 8.6 - 10.2 mg/dL Final   04/14/2021 8.4 (L) 8.6 - 10.2 mg/dL Final     Total Protein   Date Value Ref Range Status   04/16/2021 5.7 (L) 6.4 - 8.3 g/dL Final   04/15/2021 6.1 (L) 6.4 - 8.3 g/dL Final   04/14/2021 6.4 6.4 - 8.3 g/dL Final     Albumin   Date Value Ref Range Status   04/16/2021 2.3 (L) 3.5 - 5.2 g/dL Final   04/15/2021 2.5 (L) 3.5 - 5.2 g/dL Final   04/14/2021 2.6 (L) 3.5 - 5.2 g/dL Final     Total Bilirubin   Date Value Ref Range Status   04/16/2021 <0.2 0.0 - 1.2 mg/dL Final   04/15/2021 <0.2 0.0 - 1.2 mg/dL Final   04/14/2021 <0.2 0.0 - 1.2 mg/dL Final     Alkaline Phosphatase   Date Value Ref Range Status   04/16/2021 82 35 - 104 U/L Final   04/15/2021 82 35 - 104 U/L Final   04/14/2021 66 35 - 104 U/L Final     AST   Date Value Ref Range Status   04/16/2021 33 (H) 0 - 31 U/L Final   04/15/2021 29 0 - 31 U/L Final   04/14/2021 32 (H) 0 - 31 U/L Final     ALT   Date Value Ref Range Status   04/16/2021 12 0 - 32 U/L Final   04/15/2021 11 0 - 32 U/L Final   04/14/2021 12 0 - 32 U/L Final     GFR Non-   Date Value Ref Range Status   04/16/2021 >60 >=60 mL/min/1.73 Final     Comment:     Chronic Kidney Disease: less than 60 ml/min/1.73 sq.m. Kidney Failure: less than 15 ml/min/1.73 sq.m. Results valid for patients 18 years and older. 04/15/2021 55 >=60 mL/min/1.73 Final     Comment:     Chronic Kidney Disease: less than 60 ml/min/1.73 sq.m. Kidney Failure: less than 15 ml/min/1.73 sq.m. Results valid for patients 18 years and older. 04/14/2021 49 >=60 mL/min/1.73 Final     Comment:     Chronic Kidney Disease: less than 60 ml/min/1.73 sq.m. Kidney Failure: less than 15 ml/min/1.73 sq.m. Results valid for patients 18 years and older. GFR    Date Value Ref Range Status   04/16/2021 >60  Final   04/15/2021 >60  Final   04/14/2021 59  Final     Magnesium   Date Value Ref Range Status   04/15/2021 1.8 1.6 - 2.6 mg/dL Final   04/14/2021 2.0 1.6 - 2.6 mg/dL Final   04/13/2021 1.9 1.6 - 2.6 mg/dL Final     Phosphorus   Date Value Ref Range Status   04/15/2021 1.7 (L) 2.5 - 4.5 mg/dL Final   04/14/2021 2.5 2.5 - 4.5 mg/dL Final   04/13/2021 2.3 (L) 2.5 - 4.5 mg/dL Final     Recent Labs     04/16/21  0739   PH 7.311*   PO2 119.9*   PCO2 46.4*   HCO3 22.9   BE -3.3*   O2SAT 97.2       RADIOLOGY:  XR CHEST PORTABLE   Final Result   1. NG tube is present. Distal aspect of NG tube is not visualized. Short-term follow-up recommended.    2. Endotracheal tube is approximately 3 cm above the zaynab. 3. Stable airspace opacities throughout both lungs suggesting bilateral   pneumonia. XR CHEST PORTABLE   Final Result   Nasogastric tube terminates in the distal esophagus and could be further   advanced for more optimal positioning. Status post intubation. Extensive bilateral infiltrates are similar to previous. Continued follow-up   recommended. XR CHEST PORTABLE   Final Result   1. There is no interval change in extensive multifocal bilateral airspace   disease. XR CHEST PORTABLE   Final Result   Moderate bilateral pulmonary opacities favored to be due to multifocal   pneumonia unchanged since yesterday. US DUP LOWER EXTREMITIES BILATERAL VENOUS   Final Result   No evidence of DVT in either lower extremity given the limitations detailed   above. CTA CHEST W CONTRAST   Final Result   Extensive bilateral pulmonary infiltrates may reflect pneumonia or pulmonary   edema         XR CHEST PORTABLE   Final Result   Multifocal bilateral pneumonia. XR CHEST PORTABLE    (Results Pending)           PROBLEM LIST:  Principal Problem:    COVID-19  Active Problems:    HTN (hypertension)    Type 2 diabetes mellitus ([address]    Respiratory failure ([address]  Resolved Problems:    * No resolved hospital problems. *      IMPRESSION:  1. Acute respiratory distress syndrome secondary to COVID-19 pneumonitis  2. Hypoxemic respiratory failure  3. Diabetes mellitus type 2  4. Increase in D-dimer    PLAN:  1. Continue with patient proned for 14-16 hours/day  2. Trickle tube feedings  3. Full dose anticoagulation with Lovenox  4. Insulin coverage per protocol every 6 hours  5. Check magnesium and phosphorus today  6. Protonix IV twice daily  7. Continue all anti-COVID-19 treatments including immune enhancing vitamins and minerals  8.  I will call and speak with family today    ATTESTATION:  ICU Staff Physician note of personal involvement in Care  As the attending physician, I certify that I personally reviewed the patients history and personally examined the patient to confirm the physical findings described above,  And that I reviewed the relevant imaging studies and available reports. I also discussed the differential diagnosis and all of the proposed management plans with the patient and individuals accompanying the patient to this visit. They had the opportunity to ask questions about the proposed management plans and to have those questions answered. This patient has a high probability of sudden, clinically significant deterioration, which requires the highest level of physician preparedness to intervene urgently. I managed/supervised life or organ supporting interventions that required frequent physician assessment. I devoted my full attention to the direct care of this patient for the amount of time indicated below. Time I spent with the family or surrogate(s) is included only if the patient was incapable of providing the necessary information or participating in medical decisions - Time devoted to teaching and to any procedures I billed separately is not included.     CRITICAL CARE TIME:  40 minutes    Electronically signed by Zenon Aquino MD on 4/16/2021 at 11:12 AM

## 2021-04-16 NOTE — PROGRESS NOTES
afebrile  Follow cbc  sepsis  covid pneumonia severe with poss bacterial pneumonia   -s/p TOCI  RLE cellulitis   Vitamin D deficiency  pennie better cr1.1     enoxaparin (LOVENOX) injection 90 mg, Q12H  ivermectin tablet 19.5 mg, Daily     cefTRIAXone (ROCEPHIN) 1,000 mg in sterile water 10 mL IV syringe, Q24H  azithromycin (ZITHROMAX) 500 mg in D5W 250ml Vial Mate, Q24H       Check cultures    · Monitor labs    Imaging and labs were reviewed per medical records.         Electronically signed by Bart Newsome MD on 4/16/2021 at 12:35 PM

## 2021-04-16 NOTE — PROGRESS NOTES
Pharmacy Consultation Note  (Antibiotic Dosing and Monitoring)    Initial consult date:   Consulting physician: Dr Jhon Crowder  Drug(s): Vancomycin IV  Indication: Cellulitis    Ht Readings from Last 1 Encounters:   04/15/21 5' 2\" (1.575 m)     Wt Readings from Last 1 Encounters:   04/15/21 208 lb (94.3 kg)       Age/  Gender Actual BW IBW DW  Allergy Information   69 y.o.     female 95.3 kg 50.1 kg 68.2 kg  Patient has no known allergies. Date  WBC BUN/CR UOP Drug/Dose Time   Given Level(s)   (Time) Comments     (#1) 17.0 37/1.4 -- Vancomycin 1250 mg IV x 1   (#2) 9.8 34/1.2 0.7 Vancomycin 1000 mg IV Q24H   Pharmacy Consulted     (#3) 13.5 29/1.1 0.6 Vancomycin 1000 mg IV Q24H 2123     4/15  (#4) 10.6 28/1.0 0.8 Vancomycin 1000 mg IV Q24H  Trough @ 0006 = 6.5 mcg/mL      (#5) 16.1 28/0.9 0.5 Vancomycin 1000 mg IV Q18H 0256  <2100>       (#6)            (#7)            Estimated Creatinine Clearance: 61 mL/min (based on SCr of 0.9 mg/dL). UOP over the past 24 hours:       Intake/Output Summary (Last 24 hours) at 2021 1400  Last data filed at 2021 1109  Gross per 24 hour   Intake 3372.65 ml   Output 1025 ml   Net 2347.65 ml       Temp max: Temp (24hrs), Av.4 °F (35.8 °C), Min:93.9 °F (34.4 °C), Max:98.2 °F (36.8 °C)      Antibiotic Regimen:  Antibiotic Dose Date Initiated   Rocephin 1 g IV Q24H    Zithromax 500 mg IV Q24H      Cultures:  available culture and sensitivity results were reviewed in EPIC  Cultures sent and are pending.   Culture Date Result    Blood cx #1  NGTD   Blood cx #2  NGTD   Covid-19  Detected   Legionella Antigen  Negative     Assessment:  · Consulted by Dr. Ferreira Precise to dose/monitor vancomycin  · Goal trough level:  15-20 mcg/mL  · Pt is a 70 y/o F who presented with worsening SOB with oxygen saturation reportedly 70% at EMS arrival. No supplemental oxygen at baseline  · Serum creatinine today: 0.9; CrCl ~ 61 mL/min; baseline Scr n/a  · 4/15: Trough @ 0006 = 6.5 mcg/mL  · 4/16: S/p intubation overnight    Plan:  · Increase dosing to Vancomycin 1000 mg IV Q18H  · Repeat trough at steady state or sooner if renal decompensates  · Follow renal function  · Pharmacist will follow and monitor/adjust dosing as necessary      Thank you for the consult,    Grzegorz May, PharmD 4/16/2021 2:00 PM   [phone]

## 2021-04-16 NOTE — PROGRESS NOTES
Intensivist was contacted with ABG results. Provider notified that patient is currently on BiPAP, is breathing anywhere from 35-50 resp per min, and is noticeably using accessory muscles while breathing. Patient is diaphoretic, cool, and clammy. Decision made to intubate patient.  RRT called

## 2021-04-16 NOTE — PROCEDURES
Endotracheal Intubation Procedure Note    Indication for endotracheal intubation: respiratory failure  Sedation: fentanyl and etomidate  Paralytic: succinylcholine  Lidocaine: no  Atropine: no  Equipment: glidescope  Cricoid Pressure: no  Number of attempts: 1  ETT location confirmed by by auscultation, by CXR and ETCO2 monitor.     Rojelio Puentes 11:59 PM 04/15/21

## 2021-04-17 NOTE — PROGRESS NOTES
Pharmacy Consultation Note  (Antibiotic Dosing and Monitoring)    Initial consult date:   Consulting physician: Dr Adenike Chu  Drug(s): Vancomycin IV  Indication: Cellulitis    Ht Readings from Last 1 Encounters:   04/15/21 5' 2\" (1.575 m)     Wt Readings from Last 1 Encounters:   21 215 lb (97.5 kg)       Age/  Gender Actual BW IBW DW  Allergy Information   69 y.o.     female 95.3 kg 50.1 kg 68.2 kg  Patient has no known allergies. Date  WBC BUN/CR UOP (mL/kg/hr) Drug/Dose Time   Given Level(s)   (Time) Comments     (#1) 17.0 37/1.4 -- Vancomycin 1250 mg IV x 1 0       (#2) 9.8 34/1.2 0.7 Vancomycin 1000 mg IV Q24H   Pharmacy Consulted     (#3) 13.5 29/1.1 0.6 Vancomycin 1000 mg IV Q24H 2123     4/15  (#4) 10.6 28/1.0 0.8 Vancomycin 1000 mg IV Q24H  Trough @ 0006 = 6.5 mcg/mL      (#5) 16.1 28/0.9 0.5 Vancomycin 1000 mg IV Q18H 0256  1957       (#6) 15.7 24/1.1 0.5 Vancomycin 1000 mg IV Q18H <1500>       (#7)     <0900> <0830> Hold dose if trough is >20 mcg/mL     Estimated Creatinine Clearance: 51 mL/min (A) (based on SCr of 1.1 mg/dL (H)). UOP over the past 24 hours:       Intake/Output Summary (Last 24 hours) at 2021 1007  Last data filed at 2021 0500  Gross per 24 hour   Intake 4155.42 ml   Output 1200 ml   Net 2955.42 ml       Temp max: Temp (24hrs), Av.9 °F (36.1 °C), Min:95.9 °F (35.5 °C), Max:98.2 °F (36.8 °C)      Antibiotic Regimen:  Antibiotic Dose Date Initiated   Rocephin 1 g IV Q24H    Zithromax 500 mg IV Q24H      Cultures:  available culture and sensitivity results were reviewed in EPIC  Cultures sent and are pending.   Culture Date Result    Blood cx #1  NGTD   Blood cx #2  NGTD   Covid-19  Detected   Legionella Antigen  Negative     Assessment:  · Consulted by Dr. Adenike Chu to dose/monitor vancomycin  · Goal trough level:  15-20 mcg/mL  · Pt is a 68 y/o F who presented with worsening SOB with oxygen saturation reportedly 70% at EMS arrival. No supplemental oxygen at baseline  · Serum creatinine today: 1.1; CrCl ~ 51 mL/min; baseline Scr n/a  · 4/15: Trough @ 0006 = 6.5 mcg/mL  · 4/16: S/p intubation overnight    Plan:  · Continue Vancomycin 1000 mg IV Q18H  · Repeat trough tomorrow @ 0830, hold dose if trough is >20 mcg/mL  · Follow renal function  · Pharmacist will follow and monitor/adjust dosing as necessary      Thank you for the consult,    Lesley Sutherland, PharmD, BCPS 4/17/2021 10:11 AM   [phone]

## 2021-04-17 NOTE — PROGRESS NOTES
Subjective:     The patient is intubated/sedated    Objective:    BP (!) 108/50   Pulse 84   Temp 96.8 °F (36 °C) (Core)   Resp 24   Ht 5' 2\" (1.575 m)   Wt 215 lb (97.5 kg)   SpO2 100%   BMI 39.32 kg/m²   Head and Neck: normal atraumatic, no neck masses, normal thyroid, no jvd  Heart:  regular rate and rhythm, S1, S2 normal, no murmur, click, rub or gallop  Lungs:  chest clear, no wheezing, rales, normal symmetric air entry, pulse oximetry fio2 of 40% is 100%, no chest wall deformities or tenderness  Abd: soft, non-tender, without masses or organomegaly  Extrem:  No clubbing, cyanosis, or edema  Neuro:Not assessed    CBC:   Lab Results   Component Value Date    WBC 15.7 04/17/2021    RBC 3.05 04/17/2021    HGB 9.4 04/17/2021    HCT 30.6 04/17/2021    .3 04/17/2021    MCH 30.8 04/17/2021    MCHC 30.7 04/17/2021    RDW 15.9 04/17/2021     04/17/2021    MPV 8.8 04/17/2021     CMP:    Lab Results   Component Value Date     04/17/2021    K 3.6 04/17/2021     04/17/2021    CO2 22 04/17/2021    BUN 24 04/17/2021    CREATININE 1.1 04/17/2021    GFRAA 59 04/17/2021    LABGLOM 49 04/17/2021    GLUCOSE 199 04/17/2021    PROT 5.5 04/17/2021    LABALBU 2.2 04/17/2021    CALCIUM 8.1 04/17/2021    BILITOT <0.2 04/17/2021    ALKPHOS 108 04/17/2021    AST 37 04/17/2021    ALT 14 04/17/2021        Assessment:    Patient Active Problem List   Diagnosis Code    COVID-19 U07.1    HTN (hypertension) I10    Type 2 diabetes mellitus ([address] E11.9    Respiratory failure ([address] J96.90         Plan:  Per icu team    Caitlin Grady  2:25 PM  4/17/2021

## 2021-04-17 NOTE — PROGRESS NOTES
Assessment and Plan  Patient is a 69 y.o. female with the following medical Problems:   1. COVID-19 pneumonia  2. Acute hypoxic respiratory failure requiring intubation  3. Morbid obesity with obesity hypoventilation syndrome  4. Type 2 diabetes  5. Hypertension  6. Dyslipidemia    Plan Of care:  1. Patient will be prone for 12 to 16 hours a day. Vent settings will be adjusted and will be weaning PEEP while she is supine. 2.  Patient is currently on 40% FiO2. We will start weaning the PEEP slowly. 3.  Continue with antibiotic as per ID recommendation  4. Replete potassium and magnesium  5. Increase Lantus 30 units twice daily for hyperglycemia with insulin sliding scale. 6.  Patient is fully anticoagulated with Lovenox  7. GI prophylaxis    History of Present Illness:  04/17/2021  Patient is 76-year-old woman with above-mentioned medical problems who was admitted on April 12, 2021 with shortness of breath. She was diagnosed with COVID-19 pneumonia on April 1, 2021. She has been prone and mechanically ventilated for the last 14 hours. She has no fever, chills, rigors. Past Medical History:  Past Medical History:   Diagnosis Date    Anxiety     COVID-19 04/12/2021    Diabetes mellitus ([facility] [address]     Hypertension     Irregular heart beat     Thyroid disease         Family History:   History reviewed. No pertinent family history. Allergies:         Patient has no known allergies.     Social history:  Social History     Socioeconomic History    Marital status:      Spouse name: Not on file    Number of children: Not on file    Years of education: Not on file    Highest education level: Not on file   Occupational History    Not on file   Social Needs    Financial resource strain: Not on file    Food insecurity     Worry: Not on file     Inability: Not on file    Transportation needs     Medical: Not on file     Non-medical: Not on file   Tobacco Use    Smoking status: Never Smoker  Smokeless tobacco: Never Used   Substance and Sexual Activity    Alcohol use: Not Currently    Drug use: Never    Sexual activity: Not on file   Lifestyle    Physical activity     Days per week: Not on file     Minutes per session: Not on file    Stress: Not on file   Relationships    Social connections     Talks on phone: Not on file     Gets together: Not on file     Attends L[employer] day service: Not on file     Active member of club or organization: Not on file     Attends meetings of clubs or organizations: Not on file     Relationship status: Not on file    Intimate partner violence     Fear of current or ex partner: Not on file     Emotionally abused: Not on file     Physically abused: Not on file     Forced sexual activity: Not on file   Other Topics Concern    Not on file   Social History Narrative    Not on file       Current Medications:     Current Facility-Administered Medications:     cefepime (MAXIPIME) 2000 mg IVPB extended (mini-bag), 2,000 mg, Intravenous, Q12H, Yani Merida MD    anidulafungin (ERAXIS) 200 mg in dextrose 5 % 260 mL IVPB, 200 mg, Intravenous, Once **AND** [START ON 4/18/2021] anidulafungin (ERAXIS) 100 mg in dextrose 5 % 130 mL IVPB, 100 mg, Intravenous, Q24H, Yani Merida MD    alteplase (CATHFLO) injection 1 mg, 1 mg, Intracatheter, Once, Nilton Wyman MD    propofol 3,000 mg in 300 mL infusion, 5-50 mcg/kg/min, Intravenous, Titrated, Kandy Wolfe MD, Last Rate: 28.3 mL/hr at 04/17/21 0801, 50 mcg/kg/min at 04/17/21 0801    pantoprazole (PROTONIX) injection 40 mg, 40 mg, Intravenous, BID, 40 mg at 04/17/21 0758 **AND** sodium chloride (PF) 0.9 % injection 10 mL, 10 mL, Intravenous, BID, Nilton Wyman MD, 10 mL at 04/17/21 0759    enoxaparin (LOVENOX) injection 90 mg, 1 mg/kg, Subcutaneous, Q12H, Nilton Wyman MD, 90 mg at 04/17/21 0757    ivermectin tablet 19.5 mg, 200 mcg/kg, Per NG tube, Daily, Nilton Wyman MD, 19.5 mg at 04/17/21 0758   vancomycin (VANCOCIN) 1,000 mg in dextrose 5 % 250 mL IVPB, 1,000 mg, Intravenous, Q18H, Alexia Joe MD, Stopped at 04/16/21 2110    perflutren lipid microspheres (DEFINITY) injection 1.65 mg, 1.5 mL, Intravenous, ONCE PRN, Alexia Joe MD    haloperidol lactate (HALDOL) injection 2 mg, 2 mg, Intravenous, Q4H PRN, Alexia Joe MD    ipratropium-albuterol (DUONEB) nebulizer solution 1 ampule, 1 ampule, Inhalation, Q4H, Alexia Joe MD, 1 ampule at 04/17/21 1011    fentaNYL 5 mcg/ml in 0.9%  ml infusion, 12.5-200 mcg/hr, Intravenous, Continuous, Julian Ash MD, Last Rate: 30 mL/hr at 04/17/21 0730, 150 mcg/hr at 04/17/21 0730    norepinephrine (LEVOPHED) 16 mg in dextrose 5 % 250 mL infusion, 2-100 mcg/min, Intravenous, Continuous, Miya Phillips MD, Stopped at 04/17/21 0800    midazolam (VERSED) injection 5 mg, 5 mg, Intravenous, Once, Miya Phillips MD    cisatracurium besylate (NIMBEX) 200 mg in sodium chloride 0.9 % 100 mL infusion, 0.5-10 mcg/kg/min, Intravenous, Continuous, Miya Phillips MD, Last Rate: 2.8 mL/hr at 04/17/21 0745, 1 mcg/kg/min at 04/17/21 0745    insulin lispro (HUMALOG) injection vial 0-12 Units, 0-12 Units, Subcutaneous, Q6H, Alexia Joe MD, 12 Units at 04/17/21 0759    hydrALAZINE (APRESOLINE) injection 10 mg, 10 mg, Intravenous, Q6H PRN, Alexia Joe MD, 10 mg at 04/14/21 1422    insulin glargine (LANTUS) injection vial 20 Units, 20 Units, Subcutaneous, BID, aRkesh Peters MD, 20 Units at 04/17/21 0759    Vitamin D (CHOLECALCIFEROL) tablet 5,000 Units, 5,000 Units, Oral, Daily, Shimon Mcdermott MD, 5,000 Units at 04/17/21 0758    polyvinyl alcohol (LIQUIFILM TEARS) 1.4 % ophthalmic solution 2 drop, 2 drop, Both Eyes, PRN, Alexia Joe MD, 2 drop at 04/13/21 1434    lactated ringers infusion, , Intravenous, Continuous, Alexia Joe MD, Last Rate: 75 mL/hr at 04/17/21 0330, New Bag at 04/17/21 0330    glucose (GLUTOSE) 40 % oral gel 15 g, 15 g, Oral, PRN, Stanley Anderson MD    dextrose 50 % IV solution, 12.5 g, Intravenous, PRN, Stanley Anderson MD    glucagon (rDNA) injection 1 mg, 1 mg, Intramuscular, PRN, Stanley Anderson MD    dextrose 5 % solution, 100 mL/hr, Intravenous, PRN, Stanley Anderson MD    acetaminophen (TYLENOL) tablet 650 mg, 650 mg, Oral, Q6H PRN **OR** acetaminophen (TYLENOL) suppository 650 mg, 650 mg, Rectal, Q6H PRN, Nataliia Meza DO, 650 mg at 04/12/21 1018    0.9 % sodium chloride bolus, 30 mL, Intravenous, PRN, Christin Holliday DO    ascorbic acid 1,500 mg in sodium chloride 0.9 % 100 mL IVPB, 1,500 mg, Intravenous, Q6H, Stanley Anderson MD, 1,500 mg at 04/17/21 0622    zinc sulfate (ZINCATE) capsule 50 mg, 50 mg, Oral, Daily, Stanley Anderson MD, 50 mg at 04/17/21 0758    sodium chloride flush 0.9 % injection 5-40 mL, 5-40 mL, Intravenous, PRN, Joyce Grady MD, 10 mL at 04/12/21 2222    sodium chloride flush 0.9 % injection 5-40 mL, 5-40 mL, Intravenous, BID, Caitlin Grady MD, 10 mL at 04/17/21 0758    dextrose 50 % IV solution, 12.5 g, Intravenous, PRN, Stanley Anderson MD    dexamethasone (PF) (DECADRON) injection 6 mg, 6 mg, Intravenous, Daily, Stanley Anderson MD, 6 mg at 04/17/21 0622    dexmedetomidine (PRECEDEX) 400 mcg in sodium chloride 0.9 % 100 mL infusion, 0.2-1.4 mcg/kg/hr, Intravenous, Continuous, Stanley Anderson MD, Stopped at 04/15/21 2300    trimethobenzamide (TIGAN) injection 200 mg, 200 mg, Intramuscular, Q6H PRN, Stanley Anderson MD, 200 mg at 04/13/21 2204    Review of Systems:   Unable to obtain due to medical condition    Physical Exam:   Vital Signs:  BP (!) 122/48   Pulse 78   Temp 96.3 °F (35.7 °C) (Core)   Resp 24   Ht 5' 2\" (1.575 m)   Wt 215 lb (97.5 kg)   SpO2 100%   BMI 39.32 kg/m²     Input/Output:  In: 2434.1 [I.V.:2064. 1; NG/GT:120]  Out: 750     Oxygen requirements: On MV    Ventilator Information:  Vent Information  $Ventilation: $Subsequent Day  Skin Assessment: Clean, dry, & intact  Equipment ID: [id number]  Vent Type: 980  Vent Mode: AC/VC  Vt Ordered: 400 mL  Rate Set: 24 bmp  Peak Flow: 65 L/min  Pressure Support: 0 cmH20  FiO2 : (S) 40 %(SpO2 is 100% decreased to 40%)  SpO2: 100 %  SpO2/FiO2 ratio: 200  Sensitivity: 3  PEEP/CPAP: 15  Humidification Source: Heated wire  Humidification Temp: (PASSOVER)  Mask Type: Full face mask  Mask Size: Medium  Bonnet size: Medium    General appearance: not in pain or distress, in no respiratory distress    HEENT: Intubated and proned   neck: Supple, no jugular venous distension, lymphadenopathy, thyromegaly or carotid bruits  Chest: Equal normal breath sounds, no wheezing, no crackles and no tenderness over ribs   Cardiovascular: Normal S1 , S2, regular rate and rhythm, no murmur, rub or gallop  Abdomen: Normal sounds present, soft, lax with no tenderness, no hepatosplenomegaly, and no masses  Extremities: +ve edema. Pulses are equally present. Skin: intact, no rashes   Neurologic:  Sedated, intubated and mechanically ventilated. Paralyzed     Investigations:  Labs, radiological imaging and cardiac work up were reviewed        ICU STAFF PHYSICIAN NOTE OF PERSONAL INVOLVEMENT IN CARE  As the attending physician, I certify that I personally reviewed the patient's history and personally examined the patient to confirm the physical findings described above, and that I reviewed the relevant imaging studies and available reports. I also discussed the differential diagnosis and all of the proposed management plans with the patient and individuals accompanying the patient to this visit. They had the opportunity to ask questions about the proposed management plans and to have those questions answered. This patient has a high probability of sudden, clinically significant deterioration, which requires the highest level of physician preparedness to intervene urgently. I managed/supervised life or organ supporting interventions that required frequent physician assessment. I devoted my full attention to the direct care of this patient for the amount of time indicated below. Time I spent with family or surrogate(s) is included only if the patient was incapable of providing the necessary information or participating in medical decision-making. Time devoted to teaching and to any procedures I billed separately is not included.   Critical Care Time: 35 minutes      Electronically signed by Eliza Guevara MD on 4/17/2021 at 11:36 AM

## 2021-04-17 NOTE — PROGRESS NOTES
[address] [facility]  [facility]  Progress Note    NAME: Laurice Angelucci  MR:  [id number]  :   1950  DATE OF SERVICE:21    This is a face to face encounter with Laurice Angelucci 69 y.o. female on 21    CHIEF COMPLAINT     ID following for   Chief Complaint   Patient presents with    Respiratory Distress     RECENT HX OF COVID   PT WAS NOT EXAMINED  HISTORYOF PRESENT ILLNESS   Pt in icu   On vent now  PRONED  Hypothermia  Per nurse minimal secretions  REVIEW OF SYSTEMS     As stated above in the chief complaint, otherwise negative.   CURRENT MEDICATIONS     Scheduled Meds:   alteplase  1 mg Intracatheter Once    pantoprazole  40 mg Intravenous BID    And    sodium chloride (PF)  10 mL Intravenous BID    enoxaparin  1 mg/kg Subcutaneous Q12H    ivermectin  200 mcg/kg Per NG tube Daily    vancomycin  1,000 mg Intravenous Q18H    ipratropium-albuterol  1 ampule Inhalation Q4H    midazolam  5 mg Intravenous Once    insulin lispro  0-12 Units Subcutaneous Q6H    insulin glargine  20 Units Subcutaneous BID    Vitamin D  5,000 Units Oral Daily    ascorbic acid  1,500 mg Intravenous Q6H    zinc sulfate  50 mg Oral Daily    cefTRIAXone (ROCEPHIN) IV  1,000 mg Intravenous Q24H    azithromycin  500 mg Intravenous Q24H    sodium chloride flush  5-40 mL Intravenous BID    dexamethasone  6 mg Intravenous Daily     Continuous Infusions:   propofol 50 mcg/kg/min (21 0730)    fentaNYL 5 mcg/ml in 0.9%  ml infusion 150 mcg/hr (21 0730)    norepinephrine 1 mcg/min (21 0755)    cisatracurium (NIMBEX) infusion 1 mcg/kg/min (21 0745)    lactated ringers 75 mL/hr at 21 0330    dextrose      dexmedetomidine HCl in NaCl Stopped (04/15/21 2300)     PRN Meds:perflutren lipid microspheres, haloperidol lactate, hydrALAZINE, polyvinyl alcohol, glucose, dextrose, glucagon (rDNA), dextrose, acetaminophen **OR** acetaminophen, sodium chloride, sodium chloride flush, dextrose, trimethobenzamide    PHYSICAL EXAM     BP (!) 132/50   Pulse 65   Temp 96.4 °F (35.8 °C) (Core)   Resp 24   Ht 5' 2\" (1.575 m)   Wt 215 lb (97.5 kg)   SpO2 100%   BMI 39.32 kg/m²   Temp  Av.2 °F (36.2 °C)  Min: 96.4 °F (35.8 °C)  Max: 98.2 °F (36.8 °C)  Constitutional:  The patient is on vent proned  Chest:   Vent   Lines: fem tlc   Pemberton yellow     DIAGNOSTIC RESULTS   Radiology:  Laboratory and Tests Review:  Lab Results   Component Value Date    WBC 15.7 (H) 2021    WBC 16.1 (H) 2021    WBC 10.6 04/15/2021    HGB 9.4 (L) 2021    HCT 30.6 (L) 2021    .3 (H) 2021     (H) 2021     No results found for: CRP  Lab Results   Component Value Date    ALT 14 2021    AST 37 (H) 2021    ALKPHOS 108 (H) 2021    BILITOT <0.2 2021     Lab Results   Component Value Date     2021    K 3.6 2021     2021    CO2 22 2021    BUN 24 2021    CREATININE 1.1 2021    CREATININE 0.9 2021    CREATININE 1.0 04/15/2021    GFRAA 59 2021    LABGLOM 49 2021    GLUCOSE 199 2021    PROT 5.5 2021    LABALBU 2.2 2021    CALCIUM 8.1 2021    BILITOT <0.2 2021    ALKPHOS 108 2021    AST 37 2021    ALT 14 2021     Lab Results   Component Value Date    CRP 8.5 (H) 04/15/2021    CRP 14.9 (H) 2021    CRP 31.4 (H) 2021     No results found for: SEDRATE  Recent Labs     04/15/21  0541 21  0436 21  0512   CRP 8.5*  --   --    PROCAL 0.60*  --   --    DDIMER 184  --   --    AST 29 33* 37*   ALT 11 12 14     No results found for: CHOL, TRIG, HDL, LDLCALC, LABVLDL  Lab Results   Component Value Date/Time    VITD25 11 (L) 2021 04:57 AM       Microbiology:   No results for input(s): COVID19 in the last 72 hours.       FINAL IMPRESSION    Leukocytosis/fevers currently afebrile /hypothermia   Follow cbc  sepsis  covid pneumonia severe with poss bacterial pneumonia   -s/p TOCI  -s/p remdesivir  S/p convalescent plasma x2  RLE cellulitis   Vitamin D deficiency  pennie better cr1.1     ivermectin tablet 19.5 mg, Daily  vancomycin (VANCOCIN) 1,000 mg in dextrose 5 % 250 mL IVPB, Q18H  cefTRIAXone (ROCEPHIN) 1,000 mg in sterile water 10 mL IV syringe, Q24H  azithromycin (ZITHROMAX) 500 mg in D5W 250ml Vial Mate, Q24H     Check cultures  ajust atbx    · Monitor labs    Imaging and labs were reviewed per medical records.         Electronically signed by Melody Melo MD on 4/17/2021 at 8:12 AM

## 2021-04-18 NOTE — PROGRESS NOTES
Subjective: The patient is still sedated/ intubated with slight improvement.     Objective:    BP (!) 122/45   Pulse 80   Temp 97 °F (36.1 °C) (Core)   Resp 24   Ht 5' 2\" (1.575 m)   Wt 215 lb (97.5 kg)   SpO2 100%   BMI 39.32 kg/m²   Head and Neck: normal atraumatic, no neck masses, normal thyroid, no jvd  Heart:  irregular rate and rhythm, S1, S2 normal, no murmur, click, rub or gallop  Lungs:  chest  rales, normal symmetric air entry, pulse oximetry on 50 % and peep of 15 is 100%, no chest wall deformities or tenderness  Abd: soft, non-tender, without masses or organomegaly  Extrem:  No clubbing, cyanosis, or edema  Neuro:Normal, no focal neurological deficit and DTRs Brisk    CBC:   Lab Results   Component Value Date    WBC 12.3 04/18/2021    RBC 2.72 04/18/2021    HGB 8.4 04/18/2021    HCT 27.4 04/18/2021    .7 04/18/2021    MCH 30.9 04/18/2021    MCHC 30.7 04/18/2021    RDW 16.1 04/18/2021     04/18/2021    MPV 9.1 04/18/2021     CMP:    Lab Results   Component Value Date     04/18/2021    K 3.9 04/18/2021     04/18/2021    CO2 21 04/18/2021    BUN 23 04/18/2021    CREATININE 1.2 04/18/2021    GFRAA 53 04/18/2021    LABGLOM 44 04/18/2021    GLUCOSE 139 04/18/2021    PROT 5.0 04/18/2021    LABALBU 2.0 04/18/2021    CALCIUM 7.9 04/18/2021    BILITOT 0.2 04/18/2021    ALKPHOS 127 04/18/2021    AST 45 04/18/2021    ALT 17 04/18/2021        Assessment:    Patient Active Problem List   Diagnosis Code    COVID-19 U07.1    HTN (hypertension) I10    Type 2 diabetes mellitus ([facility] [address] E11.9    Respiratory failure ([facility] [address] J96.90         Plan:  Per icu team  Caitlin Grady  3:05 PM  4/18/2021

## 2021-04-18 NOTE — PROGRESS NOTES
[address] [facility]  [facility]  Progress Note    NAME: Wes Barry  MR:  [id number]  :   1950  DATE OF SERVICE:21    This is a face to face encounter with Wes Barry 69 y.o. female on 21    CHIEF COMPLAINT     ID following for   Chief Complaint   Patient presents with    Respiratory Distress     RECENT HX OF COVID   PT WAS NOT EXAMINED  HISTORYOF PRESENT ILLNESS   Pt in icu   On vent   PRONED  Temp better   ac50%  REVIEW OF SYSTEMS     As stated above in the chief complaint, otherwise negative.   CURRENT MEDICATIONS     Scheduled Meds:   furosemide  20 mg Intravenous Daily    cefepime  2,000 mg Intravenous Q12H    anidulafungin  100 mg Intravenous Q24H    insulin glargine  30 Units Subcutaneous BID    pantoprazole  40 mg Intravenous Daily    And    sodium chloride (PF)  10 mL Intravenous Daily    enoxaparin  1 mg/kg Subcutaneous Q12H    ivermectin  200 mcg/kg Per NG tube Daily    vancomycin  1,000 mg Intravenous Q18H    ipratropium-albuterol  1 ampule Inhalation Q4H    insulin lispro  0-12 Units Subcutaneous Q6H    Vitamin D  5,000 Units Oral Daily    ascorbic acid  1,500 mg Intravenous Q6H    zinc sulfate  50 mg Oral Daily    sodium chloride flush  5-40 mL Intravenous BID    dexamethasone  6 mg Intravenous Daily     Continuous Infusions:   propofol 40 mcg/kg/min (21 0705)    fentaNYL 5 mcg/ml in 0.9%  ml infusion 150 mcg/hr (21 0927)    norepinephrine Stopped (21 0100)    cisatracurium (NIMBEX) infusion 2 mcg/kg/min (21 1100)    lactated ringers 75 mL/hr at 21 2224    dextrose       PRN Meds:perflutren lipid microspheres, haloperidol lactate, hydrALAZINE, polyvinyl alcohol, glucose, dextrose, glucagon (rDNA), dextrose, acetaminophen **OR** acetaminophen, sodium chloride, sodium chloride flush, dextrose, trimethobenzamide    PHYSICAL EXAM     BP (!) 122/45   Pulse 80   Temp 97 °F (36.1 °C) (Core)   Resp 24 Ht 5' 2\" (1.575 m)   Wt 215 lb (97.5 kg)   SpO2 100%   BMI 39.32 kg/m²   Temp  Av.7 °F (36.5 °C)  Min: 97 °F (36.1 °C)  Max: 98.1 °F (36.7 °C)  Constitutional:  The patient is on vent proned  Chest:   Vent   Lines: fem tlc   Pemberton yellow     DIAGNOSTIC RESULTS   Radiology:  Laboratory and Tests Review:  Lab Results   Component Value Date    WBC 12.3 (H) 2021    WBC 15.7 (H) 2021    WBC 16.1 (H) 2021    HGB 8.4 (L) 2021    HCT 27.4 (L) 2021    .7 (H) 2021     (H) 2021     No results found for: CRP  Lab Results   Component Value Date    ALT 17 2021    AST 45 (H) 2021    ALKPHOS 127 (H) 2021    BILITOT 0.2 2021     Lab Results   Component Value Date     2021    K 3.9 2021     2021    CO2 21 2021    BUN 23 2021    CREATININE 1.2 2021    CREATININE 1.1 2021    CREATININE 0.9 2021    GFRAA 53 2021    LABGLOM 44 2021    GLUCOSE 139 2021    PROT 5.0 2021    LABALBU 2.0 2021    CALCIUM 7.9 2021    BILITOT 0.2 2021    ALKPHOS 127 2021    AST 45 2021    ALT 17 2021     Lab Results   Component Value Date    CRP 2.9 (H) 2021    CRP 8.5 (H) 04/15/2021    CRP 14.9 (H) 2021     Lab Results   Component Value Date    SEDRATE 65 (H) 2021     Recent Labs     21  0436 21  0512 21  0509   CRP  --   --  2.9*   PROCAL  --   --  0.27*   FERRITIN  --   --  319   LDH  --   --  412*   TROPONINI  --   --  <0.01   DDIMER  --   --  2618   FIBRINOGEN  --   --  431   INR  --   --  1.3   PROTIME  --   --  14.6*   AST 33* 37* 45*   ALT 12 14 17     No results found for: CHOL, TRIG, HDL, LDLCALC, LABVLDL  Lab Results   Component Value Date/Time    VITD25 11 (L) 2021 04:57 AM       Microbiology:   No results for input(s): COVID19 in the last 72 hours.       FINAL IMPRESSION    Leukocytosis/fevers currently afebrile   Follow cbc blood cx4/17 pending   sepsis  covid pneumonia severe with poss bacterial pneumonia   -s/p TOCI  -s/p remdesivir  -s/p ivermectin  S/p convalescent plasma x2  RLE cellulitis   Vitamin D deficiency  pennie cr1.2       cefepime (MAXIPIME) 2000 mg IVPB extended (mini-bag), Q12H  anidulafungin (ERAXIS) 100 mg in dextrose 5 % 130 mL IVPB, Q24H  ivermectin tablet 19.5 mg, Daily  vancomycin (VANCOCIN) 1,000 mg in dextrose 5 % 250 mL IVPB, Q18H  norepinephrine (LEVOPHED) 16 mg in dextrose 5 % 250 mL infusion,  dexamethasone (PF) (DECADRON) injection 6 mg, Daily             · Monitor labs    Imaging and labs were reviewed per medical records.         Electronically signed by Av Laguna MD on 4/18/2021 at 1:28 PM

## 2021-04-18 NOTE — PROGRESS NOTES
Pharmacy Consultation Note  (Antibiotic Dosing and Monitoring)    Initial consult date:   Consulting physician: Dr Zenaida Nagel  Drug(s): Vancomycin IV  Indication: Cellulitis    Ht Readings from Last 1 Encounters:   04/15/21 5' 2\" (1.575 m)     Wt Readings from Last 1 Encounters:   21 215 lb (97.5 kg)       Age/  Gender Actual BW IBW DW  Allergy Information   69 y.o.     female 95.3 kg 50.1 kg 68.2 kg  Patient has no known allergies. Date  WBC BUN/CR UOP (mL/kg/hr) Drug/Dose Time   Given Level(s)   (Time) Comments     (#1) 17.0 37/1.4 -- Vancomycin 1250 mg IV x 1   (#2) 9.8 34/1.2 0.7 Vancomycin 1000 mg IV Q24H 2203  Pharmacy Consulted     (#3) 13.5 29/1.1 0.6 Vancomycin 1000 mg IV Q24H 2123     4/15  (#4) 10.6 28/1.0 0.8 Vancomycin 1000 mg IV Q24H  Trough @ 0006 = 6.5 mcg/mL      (#5) 16.1 28/0.9 0.5 Vancomycin 1000 mg IV Q18H 0256  1957       (#6) 15.7 24/1.1 0.5 Vancomycin 1000 mg IV Q18H 1445       (#7) 12.3 23/1.2 0.4 Vancomycin 1000 mg IV Q18H 0854  Hold dose if trough is >20 mcg/mL     (#8)     <0300> <0230> Hold dose if trough is >20 mcg/mL     Estimated Creatinine Clearance: 47 mL/min (A) (based on SCr of 1.2 mg/dL (H)). UOP over the past 24 hours:       Intake/Output Summary (Last 24 hours) at 2021 1154  Last data filed at 2021 0530  Gross per 24 hour   Intake 5047.66 ml   Output 460 ml   Net 4587.66 ml       Temp max: Temp (24hrs), Av.6 °F (36.4 °C), Min:96.8 °F (36 °C), Max:98.1 °F (36.7 °C)      Other anti-infectives:   Antibiotic Dose Date Initiated Date Stopped   Rocephin 1 g IV Q24H    Zithromax 500 mg IV Q24H    Cefepime 2 g Q12H     Anidulafungin 200 mg x 1 then  100 mg Q24H       Cultures:  available culture and sensitivity results were reviewed in EPIC  Culture Date Result    Blood cx #1  NGTD   Blood cx #2  NGTD   Covid-19  Detected   Legionella/strep pneumo urine ag  Negative Assessment:  · Consulted by Dr. Renzo Pastrana to dose/monitor vancomycin  · Goal trough level:  15-20 mcg/mL  · Pt is a 68 y/o F who presented with worsening SOB with oxygen saturation reportedly 70% at EMS arrival. No supplemental oxygen at baseline  · Serum creatinine today: 1.2; CrCl ~ 47 mL/min; baseline Scr n/a  · 4/15: Trough @ 0006 = 6.5 mcg/mL  · 4/16: S/p intubation overnight    Plan:  · Continue Vancomycin 1000 mg IV Q18H  · Trough not collected prior to this morning's dose, re-ordered trough for tomorrow @ 0230, hold dose if trough is >20 mcg/mL  · Follow renal function  · Pharmacist will follow and monitor/adjust dosing as necessary      Thank you for the consult,    Юлия Russell, PharmD, BCPS 4/18/2021 11:54 AM   [phone]

## 2021-04-18 NOTE — PROGRESS NOTES
P/c to pt's , Elias Bobo, he is a pt on [id number] - asked if it was okay to add their daughter, Lauren Tang to the contact list. He said yes.     Alicia Book  4/18/2021

## 2021-04-18 NOTE — PLAN OF CARE
Problem: Non-Violent Restraints  Goal: No harm/injury to patient while restraints in use  Outcome: Met This Shift     Problem: Non-Violent Restraints  Goal: Patient's dignity will be maintained  Outcome: Met This Shift     Problem: Non-Violent Restraints  Goal: Removal from restraints as soon as assessed to be safe  Outcome: Not Met This Shift   Patient is paralyzed and sedated but there continues to be a risk for self extubation if the pt were to pull at lines, drains, or tubes.

## 2021-04-18 NOTE — PLAN OF CARE
Problem: Airway Clearance - Ineffective  Goal: Achieve or maintain patent airway  4/18/2021 0311 by Priscilla Walters RN  Outcome: Met This Shift  4/17/2021 1515 by Cade Santiago RN  Outcome: Met This Shift     Problem: Gas Exchange - Impaired  Goal: Absence of hypoxia  4/18/2021 0311 by Priscilla Walters RN  Outcome: Met This Shift  4/17/2021 1515 by Cade Santiago RN  Outcome: Met This Shift  Goal: Promote optimal lung function  4/18/2021 0311 by Priscilla Walters RN  Outcome: Met This Shift  4/17/2021 1515 by Cade Santiago RN  Outcome: Not Met This Shift     Problem: Isolation Precautions - Risk of Spread of Infection  Goal: Prevent transmission of infection  Outcome: Met This Shift     Problem: Non-Violent Restraints  Goal: No harm/injury to patient while restraints in use  4/18/2021 0311 by Priscilla Walters RN  Outcome: Met This Shift  4/17/2021 1515 by Cade Santiago RN  Outcome: Met This Shift  Goal: Patient's dignity will be maintained  4/18/2021 0311 by Priscilla Walters RN  Outcome: Met This Shift  4/17/2021 1515 by Cade Santiago RN  Outcome: Met This Shift     Problem: Non-Violent Restraints  Goal: Removal from restraints as soon as assessed to be safe  4/18/2021 0311 by Priscilla Walters RN  Outcome: Not Met This Shift. Pt unable to follow direction. If the patient has any involuntary movements she could accidentally dislodge a tube or line. Restraints continued for patient's safety. Will continue to assess.

## 2021-04-18 NOTE — PROGRESS NOTES
While checking OG placement, while pt prone, after air bolus thick mucus with some TF came out of the patient's mouth. TF stopped and Dr. Jostin Cueva updated. Continue to hold the TF and obtain chest xray this afternoon after pt is placed in the supine position. Also okay to give AM meds after chest x-ray.  <AM meds were scanned & crushed but not administered>    Reford Estimable  4/18/2021

## 2021-04-18 NOTE — PROGRESS NOTES
(04/18/21 0705)    fentaNYL 5 mcg/ml in 0.9%  ml infusion 150 mcg/hr (04/18/21 0927)    norepinephrine Stopped (04/18/21 0100)    cisatracurium (NIMBEX) infusion 2 mcg/kg/min (04/18/21 1100)    lactated ringers 75 mL/hr at 04/17/21 2224    dextrose       perflutren lipid microspheres, haloperidol lactate, hydrALAZINE, polyvinyl alcohol, glucose, dextrose, glucagon (rDNA), dextrose, acetaminophen **OR** acetaminophen, sodium chloride, sodium chloride flush, dextrose, trimethobenzamide      REVIEW OF SYSTEMS:  Unable to obtain secondary to sedation, paralysis, and intubation  OBJECTIVE:  Vitals:    04/18/21 0600   BP: (!) 122/45   Pulse: 80   Resp: 24   Temp:    SpO2: 100%     FiO2 : 50 %     O2 Device: Ventilator    PHYSICAL EXAM:  Constitutional: No fever, chills, diaphoresis  Skin: No skin rash, no skin breakdown  HEENT: Endotracheal tube secured at lips  Neck: No JVD, lymphadenopathy, thyromegaly  Cardiovascular: S1, S2 normal.  No S3 murmurs rubs present  Respiratory: Fine inspiratory crackles over both posterior lung fields, not as prominent as 2 days ago  Gastrointestinal: Soft, obese, nontender  Genitourinary: No grossly bloody urine  Extremities: 1+-2+ edema feet and ankles  Neurological: Sedated, paralyzed  Psychological: Unable to evaluate    LABS:  WBC   Date Value Ref Range Status   04/18/2021 12.3 (H) 4.5 - 11.5 E9/L Final   04/17/2021 15.7 (H) 4.5 - 11.5 E9/L Final   04/16/2021 16.1 (H) 4.5 - 11.5 E9/L Final     Hemoglobin   Date Value Ref Range Status   04/18/2021 8.4 (L) 11.5 - 15.5 g/dL Final   04/17/2021 9.4 (L) 11.5 - 15.5 g/dL Final   04/16/2021 9.1 (L) 11.5 - 15.5 g/dL Final     Hematocrit   Date Value Ref Range Status   04/18/2021 27.4 (L) 34.0 - 48.0 % Final   04/17/2021 30.6 (L) 34.0 - 48.0 % Final   04/16/2021 29.2 (L) 34.0 - 48.0 % Final     MCV   Date Value Ref Range Status   04/18/2021 100.7 (H) 80.0 - 99.9 fL Final   04/17/2021 100.3 (H) 80.0 - 99.9 fL Final   04/16/2021 98.6 80.0 - 99.9 fL Final     Platelets   Date Value Ref Range Status   04/18/2021 513 (H) 130 - 450 E9/L Final   04/17/2021 630 (H) 130 - 450 E9/L Final   04/16/2021 668 (H) 130 - 450 E9/L Final     Sodium   Date Value Ref Range Status   04/18/2021 139 132 - 146 mmol/L Final   04/17/2021 141 132 - 146 mmol/L Final   04/16/2021 138 132 - 146 mmol/L Final     Potassium reflex Magnesium   Date Value Ref Range Status   04/18/2021 3.9 3.5 - 5.0 mmol/L Final   04/17/2021 3.6 3.5 - 5.0 mmol/L Final   04/16/2021 3.8 3.5 - 5.0 mmol/L Final     Chloride   Date Value Ref Range Status   04/18/2021 106 98 - 107 mmol/L Final   04/17/2021 108 (H) 98 - 107 mmol/L Final   04/16/2021 106 98 - 107 mmol/L Final     CO2   Date Value Ref Range Status   04/18/2021 21 (L) 22 - 29 mmol/L Final   04/17/2021 22 22 - 29 mmol/L Final   04/16/2021 20 (L) 22 - 29 mmol/L Final     BUN   Date Value Ref Range Status   04/18/2021 23 8 - 23 mg/dL Final   04/17/2021 24 (H) 8 - 23 mg/dL Final   04/16/2021 28 (H) 8 - 23 mg/dL Final     CREATININE   Date Value Ref Range Status   04/18/2021 1.2 (H) 0.5 - 1.0 mg/dL Final   04/17/2021 1.1 (H) 0.5 - 1.0 mg/dL Final   04/16/2021 0.9 0.5 - 1.0 mg/dL Final     Glucose   Date Value Ref Range Status   04/18/2021 139 (H) 74 - 99 mg/dL Final   04/17/2021 199 (H) 74 - 99 mg/dL Final   04/16/2021 244 (H) 74 - 99 mg/dL Final     Calcium   Date Value Ref Range Status   04/18/2021 7.9 (L) 8.6 - 10.2 mg/dL Final   04/17/2021 8.1 (L) 8.6 - 10.2 mg/dL Final   04/16/2021 8.1 (L) 8.6 - 10.2 mg/dL Final     Total Protein   Date Value Ref Range Status   04/18/2021 5.0 (L) 6.4 - 8.3 g/dL Final   04/17/2021 5.5 (L) 6.4 - 8.3 g/dL Final   04/16/2021 5.7 (L) 6.4 - 8.3 g/dL Final     Albumin   Date Value Ref Range Status   04/18/2021 2.0 (L) 3.5 - 5.2 g/dL Final   04/17/2021 2.2 (L) 3.5 - 5.2 g/dL Final   04/16/2021 2.3 (L) 3.5 - 5.2 g/dL Final     Total Bilirubin   Date Value Ref Range Status   04/18/2021 0.2 0.0 - 1.2 mg/dL Final 04/17/2021 <0.2 0.0 - 1.2 mg/dL Final   04/16/2021 <0.2 0.0 - 1.2 mg/dL Final     Alkaline Phosphatase   Date Value Ref Range Status   04/18/2021 127 (H) 35 - 104 U/L Final   04/17/2021 108 (H) 35 - 104 U/L Final   04/16/2021 82 35 - 104 U/L Final     AST   Date Value Ref Range Status   04/18/2021 45 (H) 0 - 31 U/L Final   04/17/2021 37 (H) 0 - 31 U/L Final     Comment:     Specimen is slightly Hemolyzed. Result may be artificially increased. 04/16/2021 33 (H) 0 - 31 U/L Final     ALT   Date Value Ref Range Status   04/18/2021 17 0 - 32 U/L Final   04/17/2021 14 0 - 32 U/L Final   04/16/2021 12 0 - 32 U/L Final     GFR Non-   Date Value Ref Range Status   04/18/2021 44 >=60 mL/min/1.73 Final     Comment:     Chronic Kidney Disease: less than 60 ml/min/1.73 sq.m. Kidney Failure: less than 15 ml/min/1.73 sq.m. Results valid for patients 18 years and older. 04/17/2021 49 >=60 mL/min/1.73 Final     Comment:     Chronic Kidney Disease: less than 60 ml/min/1.73 sq.m. Kidney Failure: less than 15 ml/min/1.73 sq.m. Results valid for patients 18 years and older. 04/16/2021 >60 >=60 mL/min/1.73 Final     Comment:     Chronic Kidney Disease: less than 60 ml/min/1.73 sq.m. Kidney Failure: less than 15 ml/min/1.73 sq.m. Results valid for patients 18 years and older.        GFR    Date Value Ref Range Status   04/18/2021 53  Final   04/17/2021 59  Final   04/16/2021 >60  Final     Magnesium   Date Value Ref Range Status   04/15/2021 1.8 1.6 - 2.6 mg/dL Final   04/14/2021 2.0 1.6 - 2.6 mg/dL Final   04/13/2021 1.9 1.6 - 2.6 mg/dL Final     Phosphorus   Date Value Ref Range Status   04/15/2021 1.7 (L) 2.5 - 4.5 mg/dL Final   04/14/2021 2.5 2.5 - 4.5 mg/dL Final   04/13/2021 2.3 (L) 2.5 - 4.5 mg/dL Final     Recent Labs     04/18/21  0524   PH 7.343*   PO2 94.3   PCO2 41.7   HCO3 22.1   BE -3.3*   O2SAT 96.3       RADIOLOGY:  XR CHEST PORTABLE   Final Result Mild cardiomegaly and moderate pulmonary edema which is more prominent with   increasing opacities throughout both lungs. Questionable small bibasilar pleural effusions which are unchanged. No change in the tubes and catheters. XR CHEST PORTABLE   Final Result   1. NG tube is present. Distal aspect of NG tube is not visualized. Short-term follow-up recommended. 2. Endotracheal tube is approximately 3 cm above the zaynab. 3. Stable airspace opacities throughout both lungs suggesting bilateral   pneumonia. XR CHEST PORTABLE   Final Result   Nasogastric tube terminates in the distal esophagus and could be further   advanced for more optimal positioning. Status post intubation. Extensive bilateral infiltrates are similar to previous. Continued follow-up   recommended. XR CHEST PORTABLE   Final Result   1. There is no interval change in extensive multifocal bilateral airspace   disease. XR CHEST PORTABLE   Final Result   Moderate bilateral pulmonary opacities favored to be due to multifocal   pneumonia unchanged since yesterday. US DUP LOWER EXTREMITIES BILATERAL VENOUS   Final Result   No evidence of DVT in either lower extremity given the limitations detailed   above. CTA CHEST W CONTRAST   Final Result   Extensive bilateral pulmonary infiltrates may reflect pneumonia or pulmonary   edema         XR CHEST PORTABLE   Final Result   Multifocal bilateral pneumonia. XR CHEST PORTABLE    (Results Pending)   XR CHEST PORTABLE    (Results Pending)           PROBLEM LIST:  Principal Problem:    COVID-19  Active Problems:    HTN (hypertension)    Type 2 diabetes mellitus ([address]    Respiratory failure ([address]  Resolved Problems:    * No resolved hospital problems. *      IMPRESSION:  1. Acute hypoxemic respiratory failure/ARDS  2. COVID-19 pneumonitis  3. Diabetes mellitus  4. Hypertension  5. Dyslipidemia  6.  Fluid overload with positive fluid balance  7. Obesity    PLAN:  1. Decrease FiO2 as tolerated-goal today is 40%, 13 of PEEP  2. Lantus insulin with coverage  3. Tube feedings  4. Continue all COVID-19 therapy  5. Additional antifungal and antimicrobial therapy per infectious disease  6. Begin low-dose diuretic therapy  7. Decrease IV fluid  8. Place supine at 1600 hrs. today  9. Labs, chest x-ray in a.m. ATTESTATION:  ICU Staff Physician note of personal involvement in Care  As the attending physician, I certify that I personally reviewed the patients history and personally examined the patient to confirm the physical findings described above,  And that I reviewed the relevant imaging studies and available reports. I also discussed the differential diagnosis and all of the proposed management plans with the patient and individuals accompanying the patient to this visit. They had the opportunity to ask questions about the proposed management plans and to have those questions answered. This patient has a high probability of sudden, clinically significant deterioration, which requires the highest level of physician preparedness to intervene urgently. I managed/supervised life or organ supporting interventions that required frequent physician assessment. I devoted my full attention to the direct care of this patient for the amount of time indicated below. Time I spent with the family or surrogate(s) is included only if the patient was incapable of providing the necessary information or participating in medical decisions - Time devoted to teaching and to any procedures I billed separately is not included.     CRITICAL CARE TIME:  34 minutes    Electronically signed by Gaviota Pike MD on 4/18/2021 at 12:15 PM

## 2021-04-18 NOTE — PROGRESS NOTES
P/c to pt's daughter, Laith Andersen [phone] - she is not on the contact list. No information given.     Alicia Book  4/18/2021

## 2021-04-19 NOTE — PLAN OF CARE
Problem: Airway Clearance - Ineffective  Goal: Achieve or maintain patent airway  Outcome: Met This Shift     Problem: Gas Exchange - Impaired  Goal: Absence of hypoxia  Outcome: Met This Shift  Goal: Promote optimal lung function  Outcome: Met This Shift     Problem: Non-Violent Restraints  Goal: No harm/injury to patient while restraints in use  4/19/2021 0359 by Demi Waddell RN  Outcome: Met This Shift  4/18/2021 1945 by Cristine Pérez RN  Outcome: Met This Shift  Goal: Patient's dignity will be maintained  4/19/2021 0359 by Demi Waddell RN  Outcome: Met This Shift  4/18/2021 1945 by Cristine Pérez RN  Outcome: Met This Shift     Problem: Non-Violent Restraints  Goal: Removal from restraints as soon as assessed to be safe  4/19/2021 0359 by Demi Waddell RN  Outcome: Not Met This Shift    Pt unable to follow direction. If the patient has any involuntary movements she could accidentally dislodge a tube or line. Restraints continued for patient's safety. Will continue to assess.

## 2021-04-19 NOTE — PROGRESS NOTES
Assessment and Plan  Patient is a 69 y.o. female with the following medical Problems:   1. COVID-19 pneumonia  2. Acute hypoxic respiratory failure requiring intubation  3. Morbid obesity with obesity hypoventilation syndrome  4. ARDS  5. Type 2 diabetes  6. Hypertension  7. Dyslipidemia    Plan Of care:  1. Patient will be proned for 12 to 16 hours a day. Vent settings will be adjusted and will be weaning PEEP while she is supine. 2.  Patient is currently on 50% FiO2. We will start weaning the PEEP slowly. Plateau pressures 28.  3.  Continue with antibiotic as per ID recommendation  4. Replete potassium and magnesium  5. C/W Lantus 30 units twice daily for hyperglycemia with insulin sliding scale. 6.  Patient is fully anticoagulated with Lovenox  7. GI prophylaxis    History of Present Illness:  04/17/2021  Patient is 68-year-old woman with above-mentioned medical problems who was admitted on April 12, 2021 with shortness of breath. She was diagnosed with COVID-19 pneumonia on April 1, 2021. She has been prone and mechanically ventilated for the last 14 hours. She has no fever, chills, rigors. 04/19/2021:  Patient was prone this morning. She is currently on 50% oxygen. She has no fever, chills, rigors. Plateau pressure is 28. She is currently anticoagulated with Lovenox. Past Medical History:  Past Medical History:   Diagnosis Date    Anxiety     COVID-19 04/12/2021    Diabetes mellitus ([facility]     Hypertension     Irregular heart beat     Thyroid disease         Family History:   History reviewed. No pertinent family history. Allergies:         Patient has no known allergies.     Social history:  Social History     Socioeconomic History    Marital status:      Spouse name: Not on file    Number of children: Not on file    Years of education: Not on file    Highest education level: Not on file   Occupational History    Not on file   Social Needs    Financial resource strain: Not on file    Food insecurity     Worry: Not on file     Inability: Not on file    Transportation needs     Medical: Not on file     Non-medical: Not on file   Tobacco Use    Smoking status: Never Smoker    Smokeless tobacco: Never Used   Substance and Sexual Activity    Alcohol use: Not Currently    Drug use: Never    Sexual activity: Not on file   Lifestyle    Physical activity     Days per week: Not on file     Minutes per session: Not on file    Stress: Not on file   Relationships    Social connections     Talks on phone: Not on file     Gets together: Not on file     Attends Yazidi service: Not on file     Active member of club or organization: Not on file     Attends meetings of clubs or organizations: Not on file     Relationship status: Not on file    Intimate partner violence     Fear of current or ex partner: Not on file     Emotionally abused: Not on file     Physically abused: Not on file     Forced sexual activity: Not on file   Other Topics Concern    Not on file   Social History Narrative    Not on file       Current Medications:     Current Facility-Administered Medications:     cefepime (MAXIPIME) 2000 mg IVPB extended (mini-bag), 2,000 mg, Intravenous, Q12H, Wilfredo Solis MD, Last Rate: 12.5 mL/hr at 04/19/21 0822, 2,000 mg at 04/19/21 6912    [COMPLETED] anidulafungin (ERAXIS) 200 mg in dextrose 5 % 260 mL IVPB, 200 mg, Intravenous, Once, Stopped at 04/17/21 1438 **AND** anidulafungin (ERAXIS) 100 mg in dextrose 5 % 130 mL IVPB, 100 mg, Intravenous, Q24H, Wilfredo Solis MD, Stopped at 04/18/21 1334    insulin glargine (LANTUS) injection vial 30 Units, 30 Units, Subcutaneous, BID, Rochelle Wilde MD, 30 Units at 04/18/21 2030    pantoprazole (PROTONIX) injection 40 mg, 40 mg, Intravenous, Daily, 40 mg at 04/19/21 0815 **AND** sodium chloride (PF) 0.9 % injection 10 mL, 10 mL, Intravenous, Daily, Rochelle Wilde MD, 10 mL at 04/19/21 0816   propofol 2,000 mg in 200 mL infusion, 5-50 mcg/kg/min, Intravenous, Titrated, Elana Avitia MD, Last Rate: 19.8 mL/hr at 04/19/21 0814, 35 mcg/kg/min at 04/19/21 0814    enoxaparin (LOVENOX) injection 90 mg, 1 mg/kg, Subcutaneous, Q12H, Milly Agudelo MD, 90 mg at 04/19/21 0815    vancomycin (VANCOCIN) 1,000 mg in dextrose 5 % 250 mL IVPB, 1,000 mg, Intravenous, Q18H, Milly Agudelo MD, Stopped at 04/19/21 0430    perflutren lipid microspheres (DEFINITY) injection 1.65 mg, 1.5 mL, Intravenous, ONCE PRN, Milly Agudelo MD    haloperidol lactate (HALDOL) injection 2 mg, 2 mg, Intravenous, Q4H PRN, Milly Agudelo MD    ipratropium-albuterol (DUONEB) nebulizer solution 1 ampule, 1 ampule, Inhalation, Q4H, Milly Agudelo MD, 1 ampule at 04/19/21 0932    [Held by provider] fentaNYL 5 mcg/ml in 0.9%  ml infusion, 12.5-200 mcg/hr, Intravenous, Continuous, Elana Avitia MD, Last Rate: 25 mL/hr at 04/19/21 0625, 125 mcg/hr at 04/19/21 0625    norepinephrine (LEVOPHED) 16 mg in dextrose 5 % 250 mL infusion, 2-100 mcg/min, Intravenous, Continuous, Miya Phillips MD, Stopped at 04/18/21 0100    cisatracurium besylate (NIMBEX) 200 mg in sodium chloride 0.9 % 100 mL infusion, 0.5-10 mcg/kg/min, Intravenous, Continuous, Miya Phillips MD, Last Rate: 5.7 mL/hr at 04/19/21 0023, 2 mcg/kg/min at 04/19/21 0023    insulin lispro (HUMALOG) injection vial 0-12 Units, 0-12 Units, Subcutaneous, Q6H, Milly Agudelo MD, 2 Units at 04/19/21 0327    hydrALAZINE (APRESOLINE) injection 10 mg, 10 mg, Intravenous, Q6H PRN, Milly Agudelo MD, 10 mg at 04/14/21 1422    Vitamin D (CHOLECALCIFEROL) tablet 5,000 Units, 5,000 Units, Oral, Daily, Quita Gottron, MD, 5,000 Units at 04/19/21 0815    polyvinyl alcohol (LIQUIFILM TEARS) 1.4 % ophthalmic solution 2 drop, 2 drop, Both Eyes, PRN, Milly Agudelo MD, 2 drop at 04/17/21 1701    lactated ringers infusion, , Intravenous, Continuous, Sandra Matt MD, Last Rate: 75 mL/hr at 04/19/21 0325, New Bag at 04/19/21 0325    glucose (GLUTOSE) 40 % oral gel 15 g, 15 g, Oral, PRN, Gaviota Pike MD    dextrose 50 % IV solution, 12.5 g, Intravenous, PRN, Gaviota Pike MD    glucagon (rDNA) injection 1 mg, 1 mg, Intramuscular, PRN, Gaviota Pike MD    dextrose 5 % solution, 100 mL/hr, Intravenous, PRN, Gaviota Pike MD    acetaminophen (TYLENOL) tablet 650 mg, 650 mg, Oral, Q6H PRN **OR** acetaminophen (TYLENOL) suppository 650 mg, 650 mg, Rectal, Q6H PRN, Nataliia Meza DO, 650 mg at 04/12/21 1018    0.9 % sodium chloride bolus, 30 mL, Intravenous, PRN, Rachana Castellanos DO    zinc sulfate (ZINCATE) capsule 50 mg, 50 mg, Oral, Daily, Gaviota Pike MD, 50 mg at 04/19/21 0815    sodium chloride flush 0.9 % injection 5-40 mL, 5-40 mL, Intravenous, PRN, Bevelykaity Grady MD, 10 mL at 04/12/21 2222    sodium chloride flush 0.9 % injection 5-40 mL, 5-40 mL, Intravenous, BID, Sodana Grady MD, 10 mL at 04/19/21 0818    dextrose 50 % IV solution, 12.5 g, Intravenous, PRN, Gaviota Pike MD    dexamethasone (PF) (DECADRON) injection 6 mg, 6 mg, Intravenous, Daily, Gaviota Pike MD, 6 mg at 04/19/21 7033    trimethobenzamide (TIGAN) injection 200 mg, 200 mg, Intramuscular, Q6H PRN, Gaviota Pike MD, 200 mg at 04/13/21 2204    Review of Systems:   Unable to obtain due to medical condition    Physical Exam:   Vital Signs:  BP (!) 146/59   Pulse 87   Temp 98.8 °F (37.1 °C) (Core)   Resp 24   Ht 5' 2\" (1.575 m)   Wt 215 lb (97.5 kg)   SpO2 100%   BMI 39.32 kg/m²     Input/Output:  In: 2476.8 [I.V.:1876.8]  Out: 2150     Oxygen requirements: On MV    Ventilator Information:  Vent Information  $Ventilation: $Subsequent Day  Skin Assessment: Clean, dry, & intact  Equipment ID: [id number]  Vent Type: 980  Vent Mode: AC/VC  Vt Ordered: 400 mL  Rate Set: 24 bmp  Peak Flow: 65 L/min  Pressure Support: 0 cmH20  FiO2 : 50 %  SpO2: 100 %  SpO2/FiO2 ratio: 200  Sensitivity: 3  PEEP/CPAP: 14  Humidification Source: Heated wire  Humidification Temp: (PASSOVER)  Mask Type: Full face mask  Mask Size: Medium  Bonnet size: Medium    General appearance: not in pain or distress, in no respiratory distress    HEENT: Intubated and proned   neck: Supple, no jugular venous distension, lymphadenopathy, thyromegaly or carotid bruits  Chest: Decreased breath sounds, no wheezing, no crackles and no tenderness over ribs   Cardiovascular: Normal S1 , S2, regular rate and rhythm, no murmur, rub or gallop  Abdomen: Normal sounds present, soft, lax with no tenderness, no hepatosplenomegaly, and no masses  Extremities: +ve edema. Pulses are equally present. Skin: intact, no rashes   Neurologic:  Sedated, intubated and mechanically ventilated. Paralyzed     Investigations:  Labs, radiological imaging and cardiac work up were reviewed        ICU STAFF PHYSICIAN NOTE OF PERSONAL INVOLVEMENT IN CARE  As the attending physician, I certify that I personally reviewed the patient's history and personally examined the patient to confirm the physical findings described above, and that I reviewed the relevant imaging studies and available reports. I also discussed the differential diagnosis and all of the proposed management plans with the patient and individuals accompanying the patient to this visit. They had the opportunity to ask questions about the proposed management plans and to have those questions answered. This patient has a high probability of sudden, clinically significant deterioration, which requires the highest level of physician preparedness to intervene urgently. I managed/supervised life or organ supporting interventions that required frequent physician assessment. I devoted my full attention to the direct care of this patient for the amount of time indicated below.   Time I spent with family or surrogate(s) is included only if the patient was incapable of providing the necessary information or participating in medical decision-making. Time devoted to teaching and to any procedures I billed separately is not included.   Critical Care Time: 35 minutes      Electronically signed by Yvonne Antoine MD on 4/19/2021 at 11:18 AM

## 2021-04-19 NOTE — CARE COORDINATION
4/19/21 positive covid 4/13/21. Cm transition of care: pt in icu/ vent 4/15/21/isolation +covid/sedation/paralytic/prone/pressor. Pt with cvc, ett, og and king. Iv antibiotics are maxipime, eraxis, vancomycin. acp previously complete. Unclear direction of care. Pt with [facility] coverage. Pt with ARDS from the covid-19 virus. Cm/SS following. pts'  Meliza Mcginnis is still hospitalized- son alix noted.  Electronically signed by Marysol Barber RN-BC on 4/19/2021 at 9:24 AM

## 2021-04-19 NOTE — FLOWSHEET NOTE
Patient medically paralyzed and sedated on ventilator. Bilateral wrist restraints removed at this time. Will continue to monitor.

## 2021-04-19 NOTE — PROGRESS NOTES
(36.8 °C)  Min: 97.5 °F (36.4 °C)  Max: 99.1 °F (37.3 °C)  Constitutional:  The patient is on vent proned  heent at/nc  Vent chin tear  Heart s1/s2   rs dec bs post vent  abd proned  Ext edema  Cns sedated  Lines: fem tlc 4/12  [place]ember[place] yellow     DIAGNOSTIC RESULTS   Radiology:  Laboratory and Tests Review:  Lab Results   Component Value Date    WBC 13.4 (H) 04/19/2021    WBC 12.3 (H) 04/18/2021    WBC 15.7 (H) 04/17/2021    HGB 7.9 (L) 04/19/2021    HCT 25.2 (L) 04/19/2021    .0 (H) 04/19/2021     (H) 04/19/2021     No results found for: [facility]  Lab Results   Component Value Date    ALT 23 04/19/2021    AST 56 (H) 04/19/2021    ALKPHOS 169 (H) 04/19/2021    BILITOT 0.3 04/19/2021     Lab Results   Component Value Date     04/19/2021    K 4.0 04/19/2021     04/19/2021    CO2 22 04/19/2021    BUN 25 04/19/2021    CREATININE 1.4 04/19/2021    CREATININE 1.2 04/18/2021    CREATININE 1.1 04/17/2021    GFRAA 45 04/19/2021    LABGLOM 37 04/19/2021    GLUCOSE 129 04/19/2021    PROT 5.0 04/19/2021    LABALBU 2.1 04/19/2021    CALCIUM 8.0 04/19/2021    BILITOT 0.3 04/19/2021    ALKPHOS 169 04/19/2021    AST 56 04/19/2021    ALT 23 04/19/2021     Lab Results   Component Value Date    CRP 2.9 (H) 04/18/2021    CRP 8.5 (H) 04/15/2021    CRP 14.9 (H) 04/14/2021     Lab Results   Component Value Date    SEDRATE 65 (H) 04/18/2021     Recent Labs     04/17/21  0512 04/18/21  0509 04/19/21  0616   CRP  --  2.9*  --    PROCAL  --  0.27*  --    FERRITIN  --  319  --    LDH  --  412*  --    TROPONINI  --  <0.01  --    DDIMER  --  2618  --    FIBRINOGEN  --  431  --    INR  --  1.3  --    PROTIME  --  14.6*  --    AST 37* 45* 56*   ALT 14 17 23     No results found for: CHOL, TRIG, HDL, LDLCALC, LABVLDL  Lab Results   Component Value Date/Time    VITD25 11 (L) 04/13/2021 04:57 AM       Microbiology:   No results for input(s): COVID19 in the last 72 hours.       FINAL IMPRESSION    Leukocytosis/fevers currently afebrile   Follow cbc blood cx4/17 pending   sepsis  covid pneumonia severe with poss bacterial pneumonia   -s/p TOCI  -s/p remdesivir  -s/p ivermectin  S/p convalescent plasma x2  RLE cellulitis   Vitamin D deficiency  pennie cr1.2     cefepime (MAXIPIME) 2000 mg IVPB extended (mini-bag), Q12H  anidulafungin (ERAXIS) 100 mg in dextrose 5 % 130 mL IVPB, Q24H  enoxaparin (LOVENOX) injection 90 mg, Q12H  vancomycin (VANCOCIN) 1,000 mg in dextrose 5 % 250 mL IVPB, Q18H  norepinephrine (LEVOPHED) 16 mg in dextrose 5 % 250 mL infusion, Continuous     dexamethasone (PF) (DECADRON) injection 6 mg, Daily      4/17 cx ngtd        · Monitor labs    Imaging and labs were reviewed per medical records.         Electronically signed by Yani Merida MD on 4/19/2021 at 12:01 PM

## 2021-04-19 NOTE — PLAN OF CARE
Problem: Non-Violent Restraints  Goal: Removal from restraints as soon as assessed to be safe  4/19/2021 1107 by Barbara Garcia RN  Outcome: Met This Shift     Problem: Non-Violent Restraints  Goal: No harm/injury to patient while restraints in use  4/19/2021 1107 by Barbara Garcia RN  Outcome: Met This Shift     Problem: Non-Violent Restraints  Goal: Patient's dignity will be maintained  4/19/2021 1107 by Barbara Garcia RN  Outcome: Met This Shift

## 2021-04-19 NOTE — PROGRESS NOTES
Subjective:     The patient is intubated and sedated    Objective:    BP (!) 183/76   Pulse 89   Temp 97.8 °F (36.6 °C) (Core)   Resp 24   Ht 5' 2\" (1.575 m)   Wt 215 lb (97.5 kg)   SpO2 95%   BMI 39.32 kg/m²   Head and Neck: normal atraumatic, no neck masses, normal thyroid, no jvd  Heart:  regular rate and rhythm, S1, S2 normal, no murmur, click, rub or gallop  Lungs:  chest  rales, normal symmetric air entry, pulse oximetry on fio2 50% is 95%, no chest wall deformities or tenderness  Abd: soft, non-tender, without masses or organomegaly  Extrem:   edema  Neuro:can not be assessed as pt is sedated    CBC:   Lab Results   Component Value Date    WBC 13.4 04/19/2021    RBC 2.52 04/19/2021    HGB 7.9 04/19/2021    HCT 25.2 04/19/2021    .0 04/19/2021    MCH 31.3 04/19/2021    MCHC 31.3 04/19/2021    RDW 16.5 04/19/2021     04/19/2021    MPV 9.1 04/19/2021     CMP:    Lab Results   Component Value Date     04/19/2021    K 4.0 04/19/2021     04/19/2021    CO2 22 04/19/2021    BUN 25 04/19/2021    CREATININE 1.4 04/19/2021    GFRAA 45 04/19/2021    LABGLOM 37 04/19/2021    GLUCOSE 129 04/19/2021    PROT 5.0 04/19/2021    LABALBU 2.1 04/19/2021    CALCIUM 8.0 04/19/2021    BILITOT 0.3 04/19/2021    ALKPHOS 169 04/19/2021    AST 56 04/19/2021    ALT 23 04/19/2021        Assessment:    Patient Active Problem List   Diagnosis Code    COVID-19 U07.1    HTN (hypertension) I10    Type 2 diabetes mellitus ([facility] E11.9    Respiratory failure ([facility] J96.90         Plan:  Per icu team    Caitlin Grady  6:33 PM  4/19/2021

## 2021-04-19 NOTE — PROGRESS NOTES
Comprehensive Nutrition Assessment    Type and Reason for Visit:  Reassess    Nutrition Recommendations/Plan: Recommend to increase goal rate @40ml/hr x 24hrs w/1 prot. modular/d to meet optimal nutritional needs: 960mlTV/1779kcal/84gm pro/773ml FW: add'l water flushes /critical care. Once IVF is d/c'd add'l water flushes will be needed: 255ml every 4 hours. Nutrition Assessment:  Pt remains at nutritional compromise 2/2 admits w/ COVID 19 PNA , H/o DM , Morbid Obsesity and HTN. Pt is now intubatedsedated receiving TF for nutrition. Will reassess nutritional needs    Malnutrition Assessment:  Malnutrition Status: At risk for malnutrition (Comment)    Context:  Acute Illness     Findings of the 6 clinical characteristics of malnutrition:  Energy Intake:     Weight Loss:  Unable to assess(STEPHANIE wt status d/t fluid status, COVID+)     Body Fat Loss:  No significant body fat loss     Muscle Mass Loss:  No significant muscle mass loss    Fluid Accumulation:  No significant fluid accumulation     Strength:  Not Performed    Estimated Daily Nutrient Needs:  Energy (kcal):  ; Weight Used for Energy Requirements:  Admission     Protein (g):  80-90(x1.6-1.8gm/kg);  Weight Used for Protein Requirements:  Ideal        Fluid (ml/day):  per critical care; Method Used for Fluid Requirements:         Nutrition Related Findings:  Intubated, Propofol @19.8ml/hr provides  523kcal/d, hyoactive bs, missing teeth, OGT, reanl labs elevtaed, BUE +2 non-pitting edema      Wounds:  None       Current Nutrition Therapies:    Current Tube Feeding (TF) Orders:  · Feeding Route: Orogastric  · Formula: Diabetic  · Schedule: Continuous  · Water Flushes: per critical care  · Current TF & Flush Orders Provides: Currently at 10ml/hr: 240ml/280kcal/14gm pro/193ml FW      Anthropometric Measures:  · Height: 5' 2\" (157.5 cm)  · Current Body Weight: 208 lb (94.3 kg)(Using admit wt d/t wt increase likley r/t fluid status: CBW 4/19 215#) · Usual Body Weight: (No weight hx)     · Ideal Body Weight: 110 lbs; % Ideal Body Weight 189.1 %   · BMI: 38  · Adjusted Body Weight:  ; No Adjustment   · BMI Categories: Obese Class 2 (BMI 35.0 -39.9)       Nutrition Diagnosis:   · Inadequate energy intake related to impaired respiratory function as evidenced by NPO or clear liquid status due to medical condition, nutrition support - enteral nutrition      Nutrition Interventions:   Food and/or Nutrient Delivery:  Continue NPO(Recommend to increase goal rate @40ml/hr x 24hrs w/1 prot. modular/d to meet optimal nutritional needs: 960mlTV/1779kcal/84gm pro/773ml FW: add'l water flushes /critical care)  Nutrition Education/Counseling:  Education not indicated   Coordination of Nutrition Care:  Continue to monitor while inpatient    Goals:  EN Tolerance       Nutrition Monitoring and Evaluation:   Behavioral-Environmental Outcomes:  None Identified   Food/Nutrient Intake Outcomes:  Enteral Nutrition Intake/Tolerance  Physical Signs/Symptoms Outcomes:  Biochemical Data, Chewing or Swallowing, GI Status, Fluid Status or Edema, Hemodynamic Status, Nutrition Focused Physical Findings, Skin, Weight     Discharge Planning:     Too soon to determine     Electronically signed by Mary Cronin RD, LD on 4/19/21 at 1:26 PM EDT    Contact: [phone]

## 2021-04-19 NOTE — PROGRESS NOTES
Pharmacy Consultation Note  (Antibiotic Dosing and Monitoring)    Initial consult date:   Consulting physician: Dr Henrique Angulo  Drug(s): Vancomycin IV  Indication: Cellulitis    Ht Readings from Last 1 Encounters:   21 5' 2\" (1.575 m)     Wt Readings from Last 1 Encounters:   21 215 lb (97.5 kg)     Age/  Gender Actual BW IBW DW  Allergy Information   69 y.o.   female 95.3 kg 50.1 kg 68.2 kg  Patient has no known allergies. Date  WBC BUN/CR UOP (mL/kg/hr) Drug/Dose Time   Given Level(s)   (Time) Comments     (#1) 17.0 37/1.4 -- Vancomycin 1250 mg IV x 1   (#2) 9.8 34/1.2 0.7 Vancomycin 1000 mg IV Q24H 2203  Pharmacy Consulted     (#3) 13.5 29/1.1 0.6 Vancomycin 1000 mg IV Q24H 2123     4/15  (#4) 10.6 28/1.0 0.8 Vancomycin 1000 mg IV Q24H  Trough @ 0006 = 6.5 mcg/mL      (#5) 16.1 28/0.9 0.5 Vancomycin 1000 mg IV Q18H 0256  1958       (#6) 15.7 24/1.1 0.5 Vancomycin 1000 mg IV Q18H 1445       (#7) 12.3 23/1.2 0.4 Vancomycin 1000 mg IV Q18H 0854  Hold dose if trough is >20 mcg/mL     (#8) 13.4 25/1.4 1 Vancomycin 1000 mg IV Q18H 0330  <2100> Trough @ <2030> =  Hold dose if trough is >20 mcg/mL     Estimated Creatinine Clearance: 40 mL/min (A) (based on SCr of 1.4 mg/dL (H)). UOP over the past 24 hours:       Intake/Output Summary (Last 24 hours) at 2021 1347  Last data filed at 2021 1120  Gross per 24 hour   Intake 3837.76 ml   Output 2950 ml   Net 887.76 ml       Temp max: Temp (24hrs), Av.3 °F (36.8 °C), Min:97.5 °F (36.4 °C), Max:99.1 °F (37.3 °C)      Other anti-infectives:   Antibiotic Dose Date Initiated Date Stopped   Rocephin 1 g IV Q24H    Zithromax 500 mg IV Q24H    Cefepime 2 g Q12H     Anidulafungin 200 mg x 1 then  100 mg Q24H       Cultures:  available culture and sensitivity results were reviewed in EPIC  Culture Date Result    Blood cx #1  NGTD   Blood cx #2  NGTD   Covid-19  Detected Legionella/strep pneumo urine ag 4/12 Negative     Assessment:  · Consulted by Dr. Walter Melendez to dose/monitor vancomycin  · Goal trough level:  15-20 mcg/mL  · Pt is a 70 y/o F who presented with worsening SOB with oxygen saturation reportedly 70% at EMS arrival. No supplemental oxygen at baseline  · Serum creatinine today: 1.4; CrCl ~ 47 mL/min; baseline Scr n/a  · 4/15: Trough @ 0006 = 6.5 mcg/mL  · 4/16: S/p intubation overnight  · Trough's ordered for 4/18 @ 0830 and 4/19 @ 0230 - both NOT COLLECTED    Plan:  · Continue Vancomycin 1000 mg IV Q18H  · Trough tonight @ 2030.  Hold dose for level > 20 mcg/mL  · Follow renal function  · Pharmacist will follow and monitor/adjust dosing as necessary      Thank you for the consult,    Nora Hou, PharmD, BCPS 4/19/2021 1:49 PM   Ext: [phone]

## 2021-04-20 NOTE — PROGRESS NOTES
[facility]  [facility]  Progress Note    NAME: Denise Potter  MR:  [id number]  :   1950  DATE OF SERVICE:21    This is a face to face encounter with Denise Potter 69 y.o. female on 21    CHIEF COMPLAINT     ID following for   Chief Complaint   Patient presents with    Respiratory Distress     RECENT HX OF COVID   PT WAS NOT EXAMINED  HISTORYOF PRESENT ILLNESS   Pt in icu   On vent   PRONED  Has skin tear in chin  Has fem tlc     ac60%     REVIEW OF SYSTEMS     As stated above in the chief complaint, otherwise negative.   CURRENT MEDICATIONS     Scheduled Meds:   lidocaine PF  5 mL Intradermal Once    sodium chloride flush  5-40 mL Intravenous 2 times per day    heparin flush  3 mL Intravenous 2 times per day    vancomycin (VANCOCIN) intermittent dosing (placeholder)   Other RX Placeholder    cefepime  2,000 mg Intravenous Q12H    anidulafungin  100 mg Intravenous Q24H    insulin glargine  30 Units Subcutaneous BID    pantoprazole  40 mg Intravenous Daily    And    sodium chloride (PF)  10 mL Intravenous Daily    enoxaparin  1 mg/kg Subcutaneous Q12H    ipratropium-albuterol  1 ampule Inhalation Q4H    insulin lispro  0-12 Units Subcutaneous Q6H    Vitamin D  5,000 Units Oral Daily    zinc sulfate  50 mg Oral Daily    sodium chloride flush  5-40 mL Intravenous BID    dexamethasone  6 mg Intravenous Daily     Continuous Infusions:   propofol 35 mcg/kg/min (21 0700)    sodium chloride      [Held by provider] fentaNYL 5 mcg/ml in 0.9%  ml infusion 125 mcg/hr (21 0625)    norepinephrine Stopped (21 0100)    cisatracurium (NIMBEX) infusion 2 mcg/kg/min (21 0600)    lactated ringers 50 mL/hr at 21 0844    dextrose       PRN Meds:sodium chloride flush, sodium chloride, heparin flush, perflutren lipid microspheres, haloperidol lactate, hydrALAZINE, polyvinyl alcohol, glucose, dextrose, glucagon (rDNA), dextrose, acetaminophen **OR** acetaminophen, sodium chloride, sodium chloride flush, dextrose, trimethobenzamide    PHYSICAL EXAM     BP (!) 156/68   Pulse 80   Temp 97.2 °F (36.2 °C) (Bladder)   Resp 24   Ht 5' 2\" (1.575 m)   Wt 215 lb (97.5 kg)   SpO2 94%   BMI 39.32 kg/m²   Temp  Av.2 °F (36.2 °C)  Min: 96.3 °F (35.7 °C)  Max: 98.1 °F (36.7 °C)  Constitutional:  The patient is on vent proned  Cns sedated  Lines: fem tlc   [place] yellow     Spoke with nurse for change of line  DIAGNOSTIC RESULTS   Radiology:  Laboratory and Tests Review:  Lab Results   Component Value Date    WBC 13.6 (H) 2021    WBC 13.4 (H) 2021    WBC 12.3 (H) 2021    HGB 9.1 (L) 2021    HCT 29.2 (L) 2021    MCV 99.0 2021     (H) 2021     No results found for: [facility]  Lab Results   Component Value Date    ALT 26 2021    AST 53 (H) 2021    ALKPHOS 255 (H) 2021    BILITOT 0.3 2021     Lab Results   Component Value Date     2021    K 4.0 2021     2021    CO2 23 2021    BUN 24 2021    CREATININE 1.2 2021    CREATININE 1.4 2021    CREATININE 1.2 2021    GFRAA 53 2021    LABGLOM 44 2021    GLUCOSE 162 2021    PROT 5.6 2021    LABALBU 2.2 2021    CALCIUM 8.5 2021    BILITOT 0.3 2021    ALKPHOS 255 2021    AST 53 2021    ALT 26 2021     Lab Results   Component Value Date    CRP 2.9 (H) 2021    CRP 8.5 (H) 04/15/2021    CRP 14.9 (H) 2021     Lab Results   Component Value Date    SEDRATE 65 (H) 2021     Recent Labs     21  0509 21  0616 21  0543   CRP 2.9*  --   --    PROCAL 0.27*  --   --    FERRITIN 319  --   --    *  --   --    TROPONINI <0.01  --   --    DDIMER 2618  --   --    FIBRINOGEN 431  --   --    INR 1.3  --   --    PROTIME 14.6*  --   --    AST 45* 56* 53*   ALT 17 23 26     No results found for: CHOL, TRIG, HDL, LDLCALC, LABVLDL  Lab Results   Component Value Date/Time    VITD25 11 (L) 04/13/2021 04:57 AM       Microbiology:     FINAL IMPRESSION    Leukocytosis/fevers currently afebrile   Follow cbc blood cx4/17 pending   sepsis  covid pneumonia severe with poss bacterial pneumonia   -s/p TOCI  -s/p remdesivir  -s/p ivermectin  S/p convalescent plasma x2  RLE cellulitis   Vitamin D deficiency  pennie cr1.2     cefepime (MAXIPIME) 2000 mg IVPB extended (mini-bag), Q12H day4   anidulafungin (ERAXIS) 100 mg in dextrose 5 % 130 mL IVPB, Q24H day 4  enoxaparin (LOVENOX) injection 90 mg, Q12H  vancomycin (VANCOCIN)    dexamethasone (PF) (DECADRON) injection 6 mg, Daily      4/17 cx ngtd    picc then remove fem tlc    · Monitor labs    Imaging and labs were reviewed per medical records.         Electronically signed by Margo Truong MD on 4/20/2021 at 9:59 AM

## 2021-04-20 NOTE — PROGRESS NOTES
1545 Patient placed supine. , nimbex turned off to assess neuro function.      1730 random movements in all extremities not opening eyes diprivan stopped    1815 moving all extremities moves arms to verbal stimuli, random movements BLE  Sedation and paralytic restarted

## 2021-04-20 NOTE — PROGRESS NOTES
Assessment and Plan  Patient is a 69 y.o. female with the following medical Problems:   1. COVID-19 pneumonia  2. Acute hypoxic respiratory failure requiring intubation  3. Morbid obesity with obesity hypoventilation syndrome  4. ARDS  5. Type 2 diabetes  6. Hypertension  7. Dyslipidemia    Plan Of care:  1. Patient will be proned for 12 to 16 hours a day. Vent settings will be adjusted and will be weaning PEEP while she is supine. 2.  Patient is currently on 50% FiO2. We will start weaning the PEEP slowly. Plateau pressures 28.  3.  Continue with antibiotic as per ID recommendation  4. Replete potassium and magnesium  5. Increase Lantus 32 units twice daily for hyperglycemia with insulin sliding scale. 6.  Patient is fully anticoagulated with Lovenox  7. GI prophylaxis  8. We will increase Decadron to 6 mg IV every 8.  9.  Continue with propofol continue holding fentanyl drip. History of Present Illness:  04/17/2021  Patient is 25-year-old woman with above-mentioned medical problems who was admitted on April 12, 2021 with shortness of breath. She was diagnosed with COVID-19 pneumonia on April 1, 2021. She has been prone and mechanically ventilated for the last 14 hours. She has no fever, chills, rigors. 04/19/2021:  Patient was prone this morning. She is currently on 50% oxygen. She has no fever, chills, rigors. Plateau pressure is 28. She is currently anticoagulated with Lovenox. 04/20/2021  Patient was unproned for 8 hours a day. She remained prone since yesterday. She is currently anticoagulated with Lovenox. She has no fever, chills, or rigors. Past Medical History:  Past Medical History:   Diagnosis Date    Anxiety     COVID-19 04/12/2021    Diabetes mellitus ([facility] [address]     History of blood transfusion 04/13/2021    convalescent plasma    Hypertension     Irregular heart beat     Thyroid disease         Family History:   History reviewed.  No pertinent family history. Allergies:         Patient has no known allergies.     Social history:  Social History     Socioeconomic History    Marital status:      Spouse name: Not on file    Number of children: Not on file    Years of education: Not on file    Highest education level: Not on file   Occupational History    Not on file   Social Needs    Financial resource strain: Not on file    Food insecurity     Worry: Not on file     Inability: Not on file    Transportation needs     Medical: Not on file     Non-medical: Not on file   Tobacco Use    Smoking status: Never Smoker    Smokeless tobacco: Never Used   Substance and Sexual Activity    Alcohol use: Not Currently    Drug use: Never    Sexual activity: Not on file   Lifestyle    Physical activity     Days per week: Not on file     Minutes per session: Not on file    Stress: Not on file   Relationships    Social connections     Talks on phone: Not on file     Gets together: Not on file     Attends Ten[employer] service: Not on file     Active member of club or organization: Not on file     Attends meetings of clubs or organizations: Not on file     Relationship status: Not on file    Intimate partner violence     Fear of current or ex partner: Not on file     Emotionally abused: Not on file     Physically abused: Not on file     Forced sexual activity: Not on file   Other Topics Concern    Not on file   Social History Narrative    Not on file       Current Medications:     Current Facility-Administered Medications:     propofol injection, 5-50 mcg/kg/min (Order-Specific), Intravenous, Titrated, Lewis Eubanks MD, Last Rate: 19.8 mL/hr at 04/20/21 1108, 35 mcg/kg/min at 04/20/21 1108    lidocaine PF 1 % injection 5 mL, 5 mL, Intradermal, Once, Paty Pryor MD    sodium chloride flush 0.9 % injection 5-40 mL, 5-40 mL, Intravenous, 2 times per day, Paty Pryor MD    sodium chloride flush 0.9 % injection 5-40 mL, 5-40 mL, Intravenous, PRN, Silvino Garcia MD    0.9 % sodium chloride infusion, 25 mL, Intravenous, PRN, Silvino Garcia MD    heparin flush 100 UNIT/ML injection 300 Units, 3 mL, Intravenous, 2 times per day, Silvino Garcia MD    heparin flush 100 UNIT/ML injection 300 Units, 3 mL, Intracatheter, PRN, Silvino Garcia MD    insulin glargine (LANTUS) injection vial 32 Units, 32 Units, Subcutaneous, BID, Silvino Garcia MD    dexamethasone (PF) (DECADRON) injection 6 mg, 6 mg, Intravenous, Q8H, Silvino Garcia MD    vancomycin [facility]) intermittent dosing (placeholder), , Other, RX Placeholder, Bijan Love MD    cefepime (MAXIPIME) 2000 mg IVPB extended (mini-bag), 2,000 mg, Intravenous, Q12H, Eduin Estes MD, Stopped at 04/20/21 1235    [COMPLETED] anidulafungin (ERAXIS) 200 mg in dextrose 5 % 260 mL IVPB, 200 mg, Intravenous, Once, Stopped at 04/17/21 1438 **AND** anidulafungin (ERAXIS) 100 mg in dextrose 5 % 130 mL IVPB, 100 mg, Intravenous, Q24H, Eduin Estes MD, Stopped at 04/20/21 1208    pantoprazole (PROTONIX) injection 40 mg, 40 mg, Intravenous, Daily, 40 mg at 04/20/21 0832 **AND** sodium chloride (PF) 0.9 % injection 10 mL, 10 mL, Intravenous, Daily, Clifford Vu MD, 10 mL at 04/20/21 9443    enoxaparin (LOVENOX) injection 90 mg, 1 mg/kg, Subcutaneous, Q12H, Bijan Love MD, 90 mg at 04/20/21 5800    perflutren lipid microspheres (DEFINITY) injection 1.65 mg, 1.5 mL, Intravenous, ONCE PRN, Bijan Love MD    haloperidol lactate (HALDOL) injection 2 mg, 2 mg, Intravenous, Q4H PRN, Bijan Love MD    ipratropium-albuterol (DUONEB) nebulizer solution 1 ampule, 1 ampule, Inhalation, Q4H, Bijan Love MD, 1 ampule at 04/20/21 0945    [Held by provider] fentaNYL 5 mcg/ml in 0.9%  ml infusion, 12.5-200 mcg/hr, Intravenous, Continuous, Andres Perez MD, Last Rate: 25 mL/hr at 04/19/21 0625, 125 mcg/hr at 04/19/21 0625    norepinephrine (LEVOPHED) 16 mg in dextrose 5 % 250 mL infusion, 2-100 mcg/min, Intravenous, Continuous, Miya Phillips MD, Stopped at 04/18/21 0100    cisatracurium besylate (NIMBEX) 200 mg in sodium chloride 0.9 % 100 mL infusion, 0.5-10 mcg/kg/min, Intravenous, Continuous, Miya Phillips MD, Last Rate: 5.7 mL/hr at 04/20/21 1029, 2 mcg/kg/min at 04/20/21 1029    insulin lispro (HUMALOG) injection vial 0-12 Units, 0-12 Units, Subcutaneous, Q6H, Larry Deshpande MD, 4 Units at 04/20/21 5397    hydrALAZINE (APRESOLINE) injection 10 mg, 10 mg, Intravenous, Q6H PRN, Larry Deshpande MD, 10 mg at 04/20/21 7261    Vitamin D (CHOLECALCIFEROL) tablet 5,000 Units, 5,000 Units, Oral, Daily, Ceci Gómez MD, 5,000 Units at 04/20/21 1050    polyvinyl alcohol (LIQUIFILM TEARS) 1.4 % ophthalmic solution 2 drop, 2 drop, Both Eyes, PRN, Larry Deshpande MD, 2 drop at 04/17/21 1701    lactated ringers infusion, , Intravenous, Continuous, Laureen Oconnell MD, Last Rate: 50 mL/hr at 04/20/21 0844, New Bag at 04/20/21 0844    glucose (GLUTOSE) 40 % oral gel 15 g, 15 g, Oral, PRN, Larry Deshpande MD    dextrose 50 % IV solution, 12.5 g, Intravenous, PRN, Larry Deshpande MD    glucagon (rDNA) injection 1 mg, 1 mg, Intramuscular, PRN, Larry Deshpande MD    dextrose 5 % solution, 100 mL/hr, Intravenous, PRN, Larry Deshpande MD    acetaminophen (TYLENOL) tablet 650 mg, 650 mg, Oral, Q6H PRN **OR** acetaminophen (TYLENOL) suppository 650 mg, 650 mg, Rectal, Q6H PRN, Nataliia Meza DO, 650 mg at 04/12/21 1018    0.9 % sodium chloride bolus, 30 mL, Intravenous, PRN, Cindy Castillo DO    zinc sulfate (ZINCATE) capsule 50 mg, 50 mg, Oral, Daily, Larry Deshpande MD, 50 mg at 04/20/21 0712    sodium chloride flush 0.9 % injection 5-40 mL, 5-40 mL, Intravenous, PRN, Caitlin Grady MD, 10 mL at 04/19/21 1813    sodium chloride flush 0.9 % injection 5-40 mL, 5-40 mL, Intravenous, BID, to confirm the physical findings described above, and that I reviewed the relevant imaging studies and available reports. I also discussed the differential diagnosis and all of the proposed management plans with the patient and individuals accompanying the patient to this visit. They had the opportunity to ask questions about the proposed management plans and to have those questions answered. This patient has a high probability of sudden, clinically significant deterioration, which requires the highest level of physician preparedness to intervene urgently. I managed/supervised life or organ supporting interventions that required frequent physician assessment. I devoted my full attention to the direct care of this patient for the amount of time indicated below. Time I spent with family or surrogate(s) is included only if the patient was incapable of providing the necessary information or participating in medical decision-making. Time devoted to teaching and to any procedures I billed separately is not included.   Critical Care Time: 35 minutes      Electronically signed by Trey Fonseca MD on 4/20/2021 at 1:10 PM

## 2021-04-20 NOTE — PROGRESS NOTES
Pharmacy Consultation Note  (Antibiotic Dosing and Monitoring)    Initial consult date:   Consulting physician: Dr Jolynn Mark  Drug(s): Vancomycin IV  Indication: Cellulitis    Ht Readings from Last 1 Encounters:   21 5' 2\" (1.575 m)     Wt Readings from Last 1 Encounters:   21 215 lb (97.5 kg)     Age/  Gender Actual BW IBW DW  Allergy Information   69 y.o.   female 95.3 kg 50.1 kg 68.2 kg  Patient has no known allergies. Date  WBC BUN/CR UOP (mL/kg/hr) Drug/Dose Time   Given Level(s)   (Time) Comments     (#1) 17.0 37/1.4 -- Vancomycin 1250 mg IV x 1   (#2) 9.8 34/1.2 0.7 Vancomycin 1000 mg IV Q24H 2203  Pharmacy Consulted     (#3) 13.5 29/1.1 0.6 Vancomycin 1000 mg IV Q24H 2123     4/15  (#4) 10.6 28/1.0 0.8 Vancomycin 1000 mg IV Q24H  Trough @ 0006 = 6.5 mcg/mL      (#5) 16.1 28/0.9 0.5 Vancomycin 1000 mg IV Q18H 0256  195  (#6) 15.7 24/1.1 0.5 Vancomycin 1000 mg IV Q18H 1445       (#7) 12.3 23/1.2 0.4 Vancomycin 1000 mg IV Q18H 0854  Hold dose if trough is >20 mcg/mL     (#8) 13.4 25/1.4 1 Vancomycin 1000 mg IV Q18H 0330   Trough @  = 24.7 mcg/mL Hold dose if trough is >20 mcg/mL     (#9) 13.6 24/1.2 1.8 No dose -- 20.1 mcg/mL @ 0543      (#10)    Vancomycin 1000 mg IV x 1 <0600>       Estimated Creatinine Clearance: 47 mL/min (A) (based on SCr of 1.2 mg/dL (H)). UOP over the past 24 hours:       Intake/Output Summary (Last 24 hours) at 2021 1343  Last data filed at 2021 1235  Gross per 24 hour   Intake 2889.22 ml   Output 3575 ml   Net -685.78 ml       Temp max: Temp (24hrs), Av.1 °F (36.2 °C), Min:96.3 °F (35.7 °C), Max:97.9 °F (36.6 °C)      Other anti-infectives:   Antibiotic Dose Date Initiated Date Stopped   Rocephin 1 g IV Q24H    Zithromax 500 mg IV Q24H    Cefepime 2 g Q12H     Anidulafungin 200 mg x 1 then  100 mg Q24H       Cultures:  available culture and sensitivity results were reviewed in EPIC  Culture Date Result    Blood cx #1 4/12 NGTD   Blood cx #2 4/12 NGTD   Covid-19 4/12 Detected   Legionella/strep pneumo urine ag 4/12 Negative     Assessment:  · Consulted by Dr. Elliott Matt to dose/monitor vancomycin  · Goal trough level:  15-20 mcg/mL  · Pt is a 70 y/o F who presented with worsening SOB with oxygen saturation reportedly 70% at EMS arrival. No supplemental oxygen at baseline  · Serum creatinine today: 1.2; CrCl ~ 47 mL/min; baseline Scr n/a  · 4/15: Trough @ 0006 = 6.5 mcg/mL  · 4/16: S/p intubation overnight  · Trough's ordered for 4/18 @ 0830 and 4/19 @ 0230 - both NOT COLLECTED  · 4/19 trough @ 2027 = 24.7 mcg/mL  · 4/20 random @ 0543 = 20.1 mcg/mL     Plan:  · Hold vancomycin today  · Re-dose vancomycin 1000 mg IV x 1 tomorrow @ 0600  · Follow renal function  · Pharmacist will follow and monitor/adjust dosing as necessary      Thank you for the consult,    Dionicio Geller, PharmD, BCPS 4/20/2021 1:43 PM   Ext: [phone]

## 2021-04-20 NOTE — PROCEDURES
COLE power PICC Placement 4/20/2021    Product number: [id number]   Lot Number: [id number]   Consult: Femoral line needs removed, pt supine at present +covid   Ultrasound: yes/VPS   R Brachial      Upper Arm Circumference: 35CM    Size: 5F/50CM    Exposed Length: 5CM   Internal Length: 33CM   Cut: 12CM   Vein Measurement: 0.58cm  bullseye obtained with VPS, tip of catheter in the lower 1/3 of the SVC at the CAJ, site oozing, pressure held, 2x2 placed for continue bleeding with CHG tegaderm, pt ciara well on sedation.   RN aware okay to use    Jessee Plano  4/20/2021  5:12 PM

## 2021-04-21 NOTE — CARE COORDINATION
4/21/21 Positive covid 4/13/21. Cm transition of care: pt continues care in the icu/vent/proning/sedation/paralytic/picc. fio2 at 60%, peep14. CM/SS to follow. Will need pt/ot for direction of care planning when appropriate.  Electronically signed by Caroline Lucas RN-BC on 4/21/2021 at 11:16 AM

## 2021-04-21 NOTE — PROGRESS NOTES
Pharmacy Consultation Note  (Antibiotic Dosing and Monitoring)    Initial consult date:   Consulting physician: Dr Gabo Walker  Drug(s): Vancomycin IV  Indication: Cellulitis    Ht Readings from Last 1 Encounters:   21 5' 2\" (1.575 m)     Wt Readings from Last 1 Encounters:   21 215 lb (97.5 kg)     Age/  Gender Actual BW IBW DW  Allergy Information   69 y.o.   female 95.3 kg 50.1 kg 68.2 kg  Patient has no known allergies. Date  WBC BUN/CR UOP (mL/kg/hr) Drug/Dose Time   Given Level(s)   (Time) Comments     (#1) 17.0 37/1.4 -- Vancomycin 1250 mg IV x 1   (#2) 9.8 34/1.2 0.7 Vancomycin 1000 mg IV Q24H 2203  Pharmacy Consulted     (#3) 13.5 29/1.1 0.6 Vancomycin 1000 mg IV Q24H 2123     4/15  (#4) 10.6 28/1.0 0.8 Vancomycin 1000 mg IV Q24H  Trough @ 0006 = 6.5 mcg/mL      (#5) 16.1 28/0.9 0.5 Vancomycin 1000 mg IV Q18H 0256  1957       (#6) 15.7 24/1.1 0.5 Vancomycin 1000 mg IV Q18H 1445       (#7) 12.3 23/1.2 0.4 Vancomycin 1000 mg IV Q18H 0854  Hold dose if trough is >20 mcg/mL     (#8) 13.4 25/1.4 1 Vancomycin 1000 mg IV Q18H 0330   Trough @  = 24.7 mcg/mL Hold dose if trough is >20 mcg/mL     (#9) 13.6 24/1.2 1.8 No dose -- 20.1 mcg/mL @ 0543      (#10) 19.2 28/1. 2 1 Vancomycin 1000 mg IV x 1 0604       (#11)      Random @ <0600> =       Estimated Creatinine Clearance: 47 mL/min (A) (based on SCr of 1.2 mg/dL (H)). UOP over the past 24 hours:       Intake/Output Summary (Last 24 hours) at 2021 0908  Last data filed at 2021 0616  Gross per 24 hour   Intake 2754.25 ml   Output 2250 ml   Net 504.25 ml       Temp max: Temp (24hrs), Av.1 °F (36.2 °C), Min:96.4 °F (35.8 °C), Max:97.8 °F (36.6 °C)      Other anti-infectives:   Antibiotic Dose Date Initiated Date Stopped   Rocephin 1 g IV Q24H    Zithromax 500 mg IV Q24H    Cefepime 2 g Q12H     Anidulafungin 200 mg x 1 then  100 mg Q24H       Cultures: available culture and sensitivity results were reviewed in EPIC  Culture Date Result    Blood cx #1 4/12 NGTD   Blood cx #2 4/12 NGTD   Covid-19 4/12 Detected   Legionella/strep pneumo urine ag 4/12 Negative     Assessment:  · Consulted by Dr. Elliott Matt to dose/monitor vancomycin  · Goal trough level:  15-20 mcg/mL  · Pt is a 68 y/o F who presented with worsening SOB with oxygen saturation reportedly 70% at EMS arrival. No supplemental oxygen at baseline  · Serum creatinine today: 1.2; CrCl ~ 47 mL/min; baseline Scr n/a  · 4/15: Trough @ 0006 = 6.5 mcg/mL  · 4/16: S/p intubation overnight  · Trough's ordered for 4/18 @ 0830 and 4/19 @ 0230 - both NOT COLLECTED  · 4/19 trough @ 2027 = 24.7 mcg/mL  · 4/20 random @ 0543 = 20.1 mcg/mL     Plan:  · Vancomycin 1000 mg IV x 1 given  · Random level tomorrow AM  · Today is day 10 of vancomycin, consider de-escalation  · Follow renal function  · Pharmacist will follow and monitor/adjust dosing as necessary      Thank you for the consult,    Dionicio Geller, PharmD, BCPS 4/21/2021 9:08 AM   Ext: [phone]

## 2021-04-21 NOTE — PROGRESS NOTES
Subjective:     The patient is intubated and sedated    Objective:    BP (!) 143/67   Pulse 102   Temp 97.9 °F (36.6 °C) (Bladder)   Resp 24   Ht 5' 2\" (1.575 m)   Wt 215 lb (97.5 kg)   SpO2 96%   BMI 39.32 kg/m²   Head and Neck: normal atraumatic, no neck masses, normal thyroid, no jvd  Heart:  regular rate and rhythm, S1, S2 normal, no murmur, click, rub or gallop  Lungs:  chest rales, normal symmetric air entry, pulse oximetry on 50 % is 96%, no chest wall deformities or tenderness  Abd: soft, non-tender, without masses or organomegaly  Extrem:  No clubbing, cyanosis, or edema  Neuro:can not be assessed ,as pt is sedated    CBC:   Lab Results   Component Value Date    WBC 19.2 04/21/2021    RBC 3.06 04/21/2021    HGB 9.5 04/21/2021    HCT 30.4 04/21/2021    MCV 99.3 04/21/2021    MCH 31.0 04/21/2021    MCHC 31.3 04/21/2021    RDW 17.1 04/21/2021     04/21/2021    MPV 9.3 04/21/2021     CMP:    Lab Results   Component Value Date     04/21/2021    K 4.9 04/21/2021     04/21/2021    CO2 21 04/21/2021    BUN 28 04/21/2021    CREATININE 1.2 04/21/2021    GFRAA 53 04/21/2021    LABGLOM 44 04/21/2021    GLUCOSE 234 04/21/2021    PROT 6.0 04/21/2021    LABALBU 2.6 04/21/2021    CALCIUM 9.0 04/21/2021    BILITOT 0.4 04/21/2021    ALKPHOS 396 04/21/2021    AST 54 04/21/2021    ALT 35 04/21/2021        Assessment:    Patient Active Problem List   Diagnosis Code    COVID-19 U07.1    HTN (hypertension) I10    Type 2 diabetes mellitus ([facility] [address] E11.9    Respiratory failure ([facility] [address] J96.90         Plan:  Per icu team    Caitlin Grady  6:08 PM  4/21/2021

## 2021-04-21 NOTE — PROGRESS NOTES
[facility]  [facility]  Progress Note    NAME: Wilbert Mckeon  MR:  [id number]  :   1950  DATE OF SERVICE:21    This is a face to face encounter with Wilbert Mckeon 69 y.o. female on 21  Not exmamined discussed with ICU NURSE  CHIEF COMPLAINT     ID following for   Chief Complaint   Patient presents with    Respiratory Distress     RECENT HX OF COVID      HISTORYOF PRESENT ILLNESS   Pt in icu   On vent   PRONED  new line rue picc      ac50%  Vent sedated paralyzed    REVIEW OF SYSTEMS     As stated above in the chief complaint, otherwise negative.   CURRENT MEDICATIONS     Scheduled Meds:   insulin glargine  40 Units Subcutaneous BID    lidocaine PF  5 mL Intradermal Once    sodium chloride flush  5-40 mL Intravenous 2 times per day    heparin flush  3 mL Intravenous 2 times per day    dexamethasone  6 mg Intravenous Q8H    vancomycin (VANCOCIN) intermittent dosing (placeholder)   Other RX Placeholder    cefepime  2,000 mg Intravenous Q12H    anidulafungin  100 mg Intravenous Q24H    pantoprazole  40 mg Intravenous Daily    And    sodium chloride (PF)  10 mL Intravenous Daily    enoxaparin  1 mg/kg Subcutaneous Q12H    ipratropium-albuterol  1 ampule Inhalation Q4H    insulin lispro  0-12 Units Subcutaneous Q6H    Vitamin D  5,000 Units Oral Daily    zinc sulfate  50 mg Oral Daily    sodium chloride flush  5-40 mL Intravenous BID     Continuous Infusions:   propofol 35 mcg/kg/min (21 1102)    sodium chloride      [Held by provider] fentaNYL 5 mcg/ml in 0.9%  ml infusion 125 mcg/hr (21 0625)    cisatracurium (NIMBEX) infusion 3 mcg/kg/min (21 0212)    [Held by provider] lactated ringers 50 mL/hr at 21 0213    dextrose       PRN Meds:sodium chloride flush, sodium chloride, heparin flush, perflutren lipid microspheres, haloperidol lactate, hydrALAZINE, polyvinyl alcohol, glucose, dextrose, glucagon (rDNA), dextrose, acetaminophen **OR** acetaminophen, sodium chloride, sodium chloride flush, dextrose, trimethobenzamide    PHYSICAL EXAM     /64   Pulse 105   Temp 97.4 °F (36.3 °C) (Bladder)   Resp 24   Ht 5' 2\" (1.575 m)   Wt 215 lb (97.5 kg)   SpO2 98%   BMI 39.32 kg/m²   Temp  Av °F (36.1 °C)  Min: 96.4 °F (35.8 °C)  Max: 97.4 °F (36.3 °C)  Constitutional:  The patient is on vent proned  Cns sedated  Lines:  picc   [place] yellow        DIAGNOSTIC RESULTS   Radiology:  Laboratory and Tests Review:  Lab Results   Component Value Date    WBC 19.2 (H) 2021    WBC 13.6 (H) 2021    WBC 13.4 (H) 2021    HGB 9.5 (L) 2021    HCT 30.4 (L) 2021    MCV 99.3 2021     (H) 2021     No results found for: CRP  Lab Results   Component Value Date    ALT 35 (H) 2021    AST 54 (H) 2021    ALKPHOS 396 (H) 2021    BILITOT 0.4 2021     Lab Results   Component Value Date     2021    K 4.9 2021     2021    CO2 21 2021    BUN 28 2021    CREATININE 1.2 2021    CREATININE 1.2 2021    CREATININE 1.4 2021    GFRAA 53 2021    LABGLOM 44 2021    GLUCOSE 234 2021    PROT 6.0 2021    LABALBU 2.6 2021    CALCIUM 9.0 2021    BILITOT 0.4 2021    ALKPHOS 396 2021    AST 54 2021    ALT 35 2021     Lab Results   Component Value Date    CRP 2.9 (H) 2021    CRP 8.5 (H) 04/15/2021    CRP 14.9 (H) 2021     Lab Results   Component Value Date    SEDRATE 65 (H) 2021     Recent Labs     21  0616 21  0543 21  0557   AST 56* 53* 54*   ALT 23 26 35*   TRIG  --   --  285*     Lab Results   Component Value Date    TRIG 285 2021     Lab Results   Component Value Date/Time    VITD25 11 (L) 2021 04:57 AM       Microbiology:     FINAL IMPRESSION    Leukocytosis/fevers  Intermittent   Follow cbc blood cx4/17 pending sepsis  covid pneumonia severe with poss bacterial pneumonia   -s/p TOCI  -s/p remdesivir  -s/p ivermectin  S/p convalescent plasma x2  RLE cellulitis   Vitamin D deficiency  pennie cr1.2      4/17 cx ngtd  Cont current mx  Follow clinically  Decadron was increased     · Monitor labs    Imaging and labs were reviewed per medical records.         Electronically signed by Su Rowley MD on 4/21/2021 at 1:43 PM

## 2021-04-21 NOTE — PLAN OF CARE
Problem: Airway Clearance - Ineffective  Goal: Achieve or maintain patent airway  Outcome: Met This Shift     Problem: Gas Exchange - Impaired  Goal: Absence of hypoxia  Outcome: Met This Shift     Problem: Breathing Pattern - Ineffective  Goal: Ability to achieve and maintain a regular respiratory rate  Outcome: Met This Shift     Problem: Isolation Precautions - Risk of Spread of Infection  Goal: Prevent transmission of infection  Outcome: Met This Shift     Problem: Falls - Risk of:  Goal: Will remain free from falls  Description: Will remain free from falls  Outcome: Met This Shift     Problem: Falls - Risk of:  Goal: Absence of physical injury  Description: Absence of physical injury  Outcome: Met This Shift

## 2021-04-22 NOTE — PROGRESS NOTES
Anidulafungin 200 mg x 1 then  100 mg Q24H       Cultures:  available culture and sensitivity results were reviewed in EPIC  Culture Date Result    Blood cx #1  NGTD   Blood cx #2  NGTD   Covid-19  Detected   Legionella/strep pneumo urine ag  Negative     Assessment:  · Consulted by Dr. Zoey Epps to dose/monitor vancomycin  · Goal trough level:  15-20 mcg/mL  · Pt is a 70 y/o F who presented with worsening SOB with oxygen saturation reportedly 70% at EMS arrival. No supplemental oxygen at baseline  · Serum creatinine today: 1.2; CrCl ~ 47 mL/min; baseline Scr n/a  · 4/15: Trough @ 0006 = 6.5 mcg/mL  · : S/p intubation overnight  · Trough's ordered for  @ 0830 and  @ 0230 - both NOT COLLECTED  ·  trough @  = 24.7 mcg/mL  ·  random @ 0543 = 20.1 mcg/mL   ·  random @ 0505 = 25.4 mcg/mL    Plan:  · No dose today  · Random level tomorrow AM  · Follow renal function  · Pharmacist will follow and monitor/adjust dosing as necessary      Thank you for the consult,    Callie Apgar, PharmD, BCPS 2021 12:44 PM   Ext: [phone]

## 2021-04-22 NOTE — PLAN OF CARE
Problem: Airway Clearance - Ineffective  Goal: Achieve or maintain patent airway  Outcome: Met This Shift     Problem: Gas Exchange - Impaired  Goal: Absence of hypoxia  Outcome: Met This Shift  Goal: Promote optimal lung function  Outcome: Met This Shift     Problem: Isolation Precautions - Risk of Spread of Infection  Goal: Prevent transmission of infection  Outcome: Met This Shift

## 2021-04-22 NOTE — PROGRESS NOTES
98.3 80.0 - 99.9 fL Final   04/21/2021 99.3 80.0 - 99.9 fL Final   04/20/2021 99.0 80.0 - 99.9 fL Final     Platelets   Date Value Ref Range Status   04/22/2021 603 (H) 130 - 450 E9/L Final   04/21/2021 610 (H) 130 - 450 E9/L Final   04/20/2021 564 (H) 130 - 450 E9/L Final     Sodium   Date Value Ref Range Status   04/22/2021 141 132 - 146 mmol/L Final   04/21/2021 137 132 - 146 mmol/L Final   04/20/2021 136 132 - 146 mmol/L Final     Potassium reflex Magnesium   Date Value Ref Range Status   04/22/2021 4.5 3.5 - 5.0 mmol/L Final   04/21/2021 4.9 3.5 - 5.0 mmol/L Final   04/20/2021 4.0 3.5 - 5.0 mmol/L Final     Chloride   Date Value Ref Range Status   04/22/2021 106 98 - 107 mmol/L Final   04/21/2021 108 (H) 98 - 107 mmol/L Final   04/20/2021 103 98 - 107 mmol/L Final     CO2   Date Value Ref Range Status   04/22/2021 25 22 - 29 mmol/L Final   04/21/2021 21 (L) 22 - 29 mmol/L Final   04/20/2021 23 22 - 29 mmol/L Final     BUN   Date Value Ref Range Status   04/22/2021 32 (H) 6 - 23 mg/dL Final   04/21/2021 28 (H) 8 - 23 mg/dL Final   04/20/2021 24 (H) 8 - 23 mg/dL Final     CREATININE   Date Value Ref Range Status   04/22/2021 1.5 (H) 0.5 - 1.0 mg/dL Final   04/21/2021 1.2 (H) 0.5 - 1.0 mg/dL Final   04/20/2021 1.2 (H) 0.5 - 1.0 mg/dL Final     Glucose   Date Value Ref Range Status   04/22/2021 165 (H) 74 - 99 mg/dL Final   04/21/2021 234 (H) 74 - 99 mg/dL Final   04/20/2021 162 (H) 74 - 99 mg/dL Final     Calcium   Date Value Ref Range Status   04/22/2021 9.4 8.6 - 10.2 mg/dL Final   04/21/2021 9.0 8.6 - 10.2 mg/dL Final   04/20/2021 8.5 (L) 8.6 - 10.2 mg/dL Final     Total Protein   Date Value Ref Range Status   04/22/2021 6.2 (L) 6.4 - 8.3 g/dL Final   04/21/2021 6.0 (L) 6.4 - 8.3 g/dL Final   04/20/2021 5.6 (L) 6.4 - 8.3 g/dL Final     Albumin   Date Value Ref Range Status   04/22/2021 2.8 (L) 3.5 - 5.2 g/dL Final   04/21/2021 2.6 (L) 3.5 - 5.2 g/dL Final   04/20/2021 2.2 (L) 3.5 - 5.2 g/dL Final     Total Bilirubin   Date Value Ref Range Status   04/22/2021 0.4 0.0 - 1.2 mg/dL Final   04/21/2021 0.4 0.0 - 1.2 mg/dL Final   04/20/2021 0.3 0.0 - 1.2 mg/dL Final     Alkaline Phosphatase   Date Value Ref Range Status   04/22/2021 520 (H) 35 - 104 U/L Final   04/21/2021 396 (H) 35 - 104 U/L Final   04/20/2021 255 (H) 35 - 104 U/L Final     AST   Date Value Ref Range Status   04/22/2021 57 (H) 0 - 31 U/L Final     Comment:     Specimen is slightly Hemolyzed. Result may be artificially increased. 04/21/2021 54 (H) 0 - 31 U/L Final     Comment:     Specimen is slightly Hemolyzed. Result may be artificially increased. 04/20/2021 53 (H) 0 - 31 U/L Final     ALT   Date Value Ref Range Status   04/22/2021 40 (H) 0 - 32 U/L Final   04/21/2021 35 (H) 0 - 32 U/L Final   04/20/2021 26 0 - 32 U/L Final     GFR Non-   Date Value Ref Range Status   04/22/2021 34 >=60 mL/min/1.73 Final     Comment:     Chronic Kidney Disease: less than 60 ml/min/1.73 sq.m. Kidney Failure: less than 15 ml/min/1.73 sq.m. Results valid for patients 18 years and older. 04/21/2021 44 >=60 mL/min/1.73 Final     Comment:     Chronic Kidney Disease: less than 60 ml/min/1.73 sq.m. Kidney Failure: less than 15 ml/min/1.73 sq.m. Results valid for patients 18 years and older. 04/20/2021 44 >=60 mL/min/1.73 Final     Comment:     Chronic Kidney Disease: less than 60 ml/min/1.73 sq.m. Kidney Failure: less than 15 ml/min/1.73 sq.m. Results valid for patients 18 years and older.        GFR    Date Value Ref Range Status   04/22/2021 41  Final   04/21/2021 53  Final   04/20/2021 53  Final     Magnesium   Date Value Ref Range Status   04/22/2021 2.0 1.6 - 2.6 mg/dL Final   04/15/2021 1.8 1.6 - 2.6 mg/dL Final   04/14/2021 2.0 1.6 - 2.6 mg/dL Final     Phosphorus   Date Value Ref Range Status   04/15/2021 1.7 (L) 2.5 - 4.5 mg/dL Final   04/14/2021 2.5 2.5 - 4.5 mg/dL Final   04/13/2021 2.3 (L) 2.5 - 4.5 mg/dL Final     No results for input(s): PH, PO2, PCO2, HCO3, BE, O2SAT in the last 72 hours. RADIOLOGY:  XR CHEST PORTABLE   Final Result   Bilateral lower lobe airspace opacities. XR CHEST PORTABLE   Final Result   1. Multifocal bilateral airspace disease. In comparison with the prior study   of 04/18/2021 the airspace disease within the left upper lobe is more   extensive. .         XR ABDOMEN FOR NG/OG/NE TUBE PLACEMENT   Final Result   Nasogastric tube with the tip in the stomach. XR CHEST PORTABLE   Final Result   No change in bilateral airspace opacities. May represent ongoing pneumonia   versus pulmonary edema. Enteric feeding tube terminates in the distal esophagus. Recommend   advancement. XR CHEST PORTABLE   Final Result   Mild cardiomegaly and moderate pulmonary edema which is more prominent with   increasing opacities throughout both lungs. Questionable small bibasilar pleural effusions which are unchanged. No change in the tubes and catheters. XR CHEST PORTABLE   Final Result   1. NG tube is present. Distal aspect of NG tube is not visualized. Short-term follow-up recommended. 2. Endotracheal tube is approximately 3 cm above the zaynab. 3. Stable airspace opacities throughout both lungs suggesting bilateral   pneumonia. XR CHEST PORTABLE   Final Result   Nasogastric tube terminates in the distal esophagus and could be further   advanced for more optimal positioning. Status post intubation. Extensive bilateral infiltrates are similar to previous. Continued follow-up   recommended. XR CHEST PORTABLE   Final Result   1. There is no interval change in extensive multifocal bilateral airspace   disease. XR CHEST PORTABLE   Final Result   Moderate bilateral pulmonary opacities favored to be due to multifocal   pneumonia unchanged since yesterday.          US DUP LOWER EXTREMITIES BILATERAL VENOUS   Final Result No evidence of DVT in either lower extremity given the limitations detailed   above. CTA CHEST W CONTRAST   Final Result   Extensive bilateral pulmonary infiltrates may reflect pneumonia or pulmonary   edema         XR CHEST PORTABLE   Final Result   Multifocal bilateral pneumonia. XR CHEST PORTABLE    (Results Pending)     PROBLEM LIST:  Principal Problem:    COVID-19  Active Problems:    HTN (hypertension)    Type 2 diabetes mellitus ([facility] [address]    Respiratory failure ([facility] [address]  Resolved Problems:    * No resolved hospital problems. *    BP (!) 145/61   Pulse 89   Temp 97.9 °F (36.6 °C) (Core)   Resp 26   Ht 5' 2\" (1.575 m)   Wt 215 lb (97.5 kg)   SpO2 92%   BMI 39.32 kg/m²   General: Somnolent, stares, does not follow commands  HEENT: No head lesions, PERRL, EOMI, mouth without lesions, no nasal lesions, no cervical adenopathy palpated  Respiratory: Lungs with diminished breath sounds bilaterally, no adventitious sounds auscultated, no accessory muscle use  CV: Regular rate, no murmurs, no JVD, no leg edema  Abdomen: Soft, apparently non tender, + bowel sounds  Skin: Hydrated, adequate turgor, no rash, capillary refill <2 seconds  Extremities: Moves 4 limbs when stimulated with pain, distal pulses present  Neurology: Somnolent, neck is supple, no meningitic signs present. ATTESTATION:  ICU Staff Physician note of personal involvement in Care  As the attending physician, I certify that I personally reviewed the patients history and personnally examined the patient to confirm the physical findings described above,  And that I reviewed the relevant imaging studies and available reports. I also discussed the differential diagnosis and all of the proposed management plans with the patient and individuals accompanying the patient to this visit. They had the opportunity to ask questions about the proposed management plans and to have those questions answered.      This patient has a high

## 2021-04-22 NOTE — PLAN OF CARE
Problem: Non-Violent Restraints  Goal: Removal from restraints as soon as assessed to be safe  Outcome: Not Met This Shift  Goal: No harm/injury to patient while restraints in use  Outcome: Met This Shift  Goal: Patient's dignity will be maintained  Outcome: Met This Shift     Problem: Breathing Pattern - Ineffective  Goal: Ability to achieve and maintain a regular respiratory rate  Outcome: Not Met This Shift     Problem: Airway Clearance - Ineffective  Goal: Achieve or maintain patent airway  Outcome: Met This Shift     Problem: Gas Exchange - Impaired  Goal: Absence of hypoxia  Outcome: Met This Shift

## 2021-04-22 NOTE — PROGRESS NOTES
Patient RASS -2, but when weaning propofol for a RASS of -1, patient becomes tachypneic and noncompliant with the ventilator. Notified Dr. Lexus Garvin. Orders to start Fentanyl infusion and attempt to wean off propofol. Okay for RASS of -2 until patient is compliant with the ventilator.    Kate Heck

## 2021-04-22 NOTE — CARE COORDINATION
4/22/21 Positive covid 4/13/21. CM transition of care: pt continues care in icu  Icu/vent/sedation/paralytic-prone. Need therapies when pt is off the vent. Unclear direction of discharge plan. Pt with [facility]- may need to look into LTACH- [employer] to run benefits to check into coverage for LTACH. CM/SS to follow.  Electronically signed by Valentin Thompson RN on 4/22/2021 at 10:17 AM

## 2021-04-22 NOTE — PROGRESS NOTES
[address] [facility]  [facility]  Progress Note    NAME: Megan Soto  MR:  [id number]  :   1950  DATE OF SERVICE:21    This is a face to face encounter with Megan Soto 69 y.o. female on 21  Not exmamined discussed with ICU NURSE  CHIEF COMPLAINT     ID following for   Chief Complaint   Patient presents with    Respiratory Distress     RECENT HX OF COVID      HISTORYOF PRESENT ILLNESS   Pt in icu   On vent   PRONED to be supine later today   received update from nurse   new line rue picc      ac50%  Vent sedated paralyzed    REVIEW OF SYSTEMS     As stated above in the chief complaint, otherwise negative.   CURRENT MEDICATIONS     Scheduled Meds:   insulin glargine  40 Units Subcutaneous BID    lidocaine PF  5 mL Intradermal Once    sodium chloride flush  5-40 mL Intravenous 2 times per day    heparin flush  3 mL Intravenous 2 times per day    dexamethasone  6 mg Intravenous Q8H    vancomycin (VANCOCIN) intermittent dosing (placeholder)   Other RX Placeholder    cefepime  2,000 mg Intravenous Q12H    anidulafungin  100 mg Intravenous Q24H    pantoprazole  40 mg Intravenous Daily    And    sodium chloride (PF)  10 mL Intravenous Daily    enoxaparin  1 mg/kg Subcutaneous Q12H    ipratropium-albuterol  1 ampule Inhalation Q4H    insulin lispro  0-12 Units Subcutaneous Q6H    Vitamin D  5,000 Units Oral Daily    zinc sulfate  50 mg Oral Daily    sodium chloride flush  5-40 mL Intravenous BID     Continuous Infusions:   propofol 35 mcg/kg/min (21 0730)    sodium chloride      [Held by provider] fentaNYL 5 mcg/ml in 0.9%  ml infusion 125 mcg/hr (21 0625)    cisatracurium (NIMBEX) infusion 3 mcg/kg/min (21 0730)    [Held by provider] lactated ringers 50 mL/hr at 21 0213    dextrose       PRN Meds:sodium chloride flush, sodium chloride, heparin flush, perflutren lipid microspheres, haloperidol lactate, hydrALAZINE, Leukocytosis/fevers  Intermittent   Follow cbc blood cx4/17 pending   sepsis  covid pneumonia severe with poss bacterial pneumonia   -s/p TOCI  -s/p remdesivir  -s/p ivermectin  S/p convalescent plasma x2  RLE cellulitis   Vitamin D deficiency  pennie cr1.2        dexamethasone (PF) (DECADRON) injection 6 mg, Q8H  vancomycin (VANCOCIN) intermittent dosing (placeholder), RX Placeholder  cefepime (MAXIPIME) 2000 mg IVPB extended (mini-bag), Q12H  anidulafungin (ERAXIS) 100 mg in dextrose 5 % 130 mL IVPB, Q24H     · Monitor labs  · cx ngtd    Imaging and labs were reviewed per medical records.         Electronically signed by Erick Arthur MD on 4/22/2021 at 9:34 AM

## 2021-04-23 NOTE — PLAN OF CARE
Problem: Airway Clearance - Ineffective  Goal: Achieve or maintain patent airway  4/23/2021 0456 by Erick Ibrahim RN  Outcome: Met This Shift  4/22/2021 1853 by Alessandra Loyd RN  Outcome: Met This Shift     Problem: Gas Exchange - Impaired  Goal: Absence of hypoxia  4/23/2021 0456 by Erick Ibrahim RN  Outcome: Met This Shift  4/22/2021 1853 by Alessandra Loyd RN  Outcome: Met This Shift  Goal: Promote optimal lung function  4/23/2021 0456 by Erick Ibrahim RN  Outcome: Met This Shift  4/22/2021 1853 by Alessandra Loyd RN  Outcome: Not Met This Shift     Problem: Non-Violent Restraints  Goal: No harm/injury to patient while restraints in use  4/23/2021 0456 by Erick Ibrahim RN  Outcome: Met This Shift  4/22/2021 1854 by Alessandra Loyd RN  Outcome: Met This Shift  Goal: Patient's dignity will be maintained  4/23/2021 0456 by Erick Ibrahim RN  Outcome: Met This Shift  4/22/2021 1854 by Alessandra Loyd RN  Outcome: Met This Shift     Problem: Non-Violent Restraints  Goal: Removal from restraints as soon as assessed to be safe  4/23/2021 0456 by Erick Ibrahim RN  Outcome: Not Met This Shift    Patient educated on restraints and lines/tubes connected to her. Patient continues to pull at lines and tubes when ROM is performed and is not redirectable at this time. Restraints continued for patient's safety. Will continue to assess.

## 2021-04-23 NOTE — PLAN OF CARE
Problem: Airway Clearance - Ineffective  Goal: Achieve or maintain patent airway  Outcome: Met This Shift    Problem: Gas Exchange - Impaired  Goal: Absence of hypoxia  Outcome: Met This Shift  Goal: Promote optimal lung function  Outcome: Not Met This Shift     Problem: Non-Violent Restraints  Goal: Removal from restraints as soon as assessed to be safe  Outcome: Not Met This Shift  Goal: No harm/injury to patient while restraints in use  Outcome: Met This Shift  Goal: Patient's dignity will be maintained  Outcome: Met This Shift

## 2021-04-23 NOTE — PROGRESS NOTES
Pharmacy Consultation Note  (Antibiotic Dosing and Monitoring)    Initial consult date:   Consulting physician: Dr Graig Koyanagi  Drug(s): Vancomycin IV  Indication: Cellulitis    Ht Readings from Last 1 Encounters:   21 5' 2\" (1.575 m)     Wt Readings from Last 1 Encounters:   21 224 lb (101.6 kg)     Age/  Gender Actual BW IBW DW  Allergy Information   69 y.o.   female 95.3 kg 50.1 kg 68.2 kg  Patient has no known allergies. Date  WBC BUN/CR UOP (mL/kg/hr) Drug/Dose Time   Given Level(s)   (Time) Comments     (#1) 17.0 37/1.4 -- Vancomycin 1250 mg IV x 1 2230       (#2) 9.8 34/1.2 0.7 Vancomycin 1000 mg IV Q24H 2203  Pharmacy Consulted     (#3) 13.5 29/1.1 0.6 Vancomycin 1000 mg IV Q24H 2123     4/15  (#4) 10.6 28/1.0 0.8 Vancomycin 1000 mg IV Q24H  Trough @ 0006 = 6.5 mcg/mL      (#5) 16.1 28/0.9 0.5 Vancomycin 1000 mg IV Q18H 0256  1957       (#6) 15.7 24/1.1 0.5 Vancomycin 1000 mg IV Q18H 1445       (#7) 12.3 23/1.2 0.4 Vancomycin 1000 mg IV Q18H 0854  Hold dose if trough is >20 mcg/mL     (#8) 13.4 25/1.4 1 Vancomycin 1000 mg IV Q18H 0330   Trough @ 7 = 24.7 mcg/mL Hold dose if trough is >20 mcg/mL     (#9) 13.6 24/1.2 1.8 No dose -- 20.1 mcg/mL @ 0543      (#10) 19.2 28/1. 2 1 Vancomycin 1000 mg IV x 1 0604       (#11) 20.5 32/1.5 1.9 No dose -- Random @ 0505 = 25.4 mcg/mL      (#12) 17.9 36/1.7 1.1 No dose -- Random @ 0551 = 19 mcg/mL      (#13)    Vancomycin 1000 mg IV x 1 <0600>       Estimated Creatinine Clearance: 34 mL/min (A) (based on SCr of 1.7 mg/dL (H)). UOP over the past 24 hours:       Intake/Output Summary (Last 24 hours) at 2021 1350  Last data filed at 2021 1041  Gross per 24 hour   Intake 1955.36 ml   Output 2700 ml   Net -744.64 ml       Temp max: Temp (24hrs), Av.2 °F (36.8 °C), Min:97.9 °F (36.6 °C), Max:98.8 °F (37.1 °C)      Other anti-infectives:   Antibiotic Dose Date Initiated Date Stopped   Rocephin 1 g IV Q24H 4/12 4/16   Zithromax 500 mg IV Q24H 4/12 4/16   Cefepime 2 g Q12H 4/17    Anidulafungin 200 mg x 1 then  100 mg Q24H 4/17 4/18      Cultures:  available culture and sensitivity results were reviewed in EPIC  Culture Date Result    Blood cx #1 4/12 NGTD   Blood cx #2 4/12 NGTD   Covid-19 4/12 Detected   Legionella/strep pneumo urine ag 4/12 Negative     Assessment:  · Consulted by Dr. Christin Singh to dose/monitor vancomycin  · Goal trough level:  15-20 mcg/mL  · Pt is a 70 y/o F who presented with worsening SOB with oxygen saturation reportedly 70% at EMS arrival. No supplemental oxygen at baseline  · Serum creatinine today: 1.7; CrCl ~ 47 mL/min; baseline Scr n/a  · 4/15: Trough @ 0006 = 6.5 mcg/mL  · 4/16: S/p intubation overnight  · Trough's ordered for 4/18 @ 0830 and 4/19 @ 0230 - both NOT COLLECTED  · 4/19 trough @ 2027 = 24.7 mcg/mL  · 4/20 random @ 0543 = 20.1 mcg/mL   · 4/22 random @ 0505 = 25.4 mcg/mL  · 4/23 random @ 0551 = 19 mcg/mL    Plan:  · No dose today  · Re-dose vancomycin 1000 mg IV x 1 tomorrow AM  · Per ID, plan to stop antibiotics on 4/25  · Follow renal function  · Pharmacist will follow and monitor/adjust dosing as necessary      Thank you for the consult,    Main Diaz, ArmaanD, BCPS 4/23/2021 1:50 PM   Ext: [phone]

## 2021-04-23 NOTE — PROGRESS NOTES
Sedation interruption performed again per Dr. Danae Joseph. Time sedative infusions stopped: 1300  Time of wake up: (patient was already alert before turning off sedation)  RASS: RASS Score: Agitated  BP (!) 197/101   Pulse 76   Temp 98.3 °F (36.8 °C) (Core)   Resp (!) 32   Ht 5' 2\" (1.575 m)   Wt 224 lb (101.6 kg)   SpO2 (!) 85%   BMI 40.97 kg/m² Neurological assessment: Patient unable to follow commands, but agitated, fighting ventilator. Oxygen increased to 50% and PEEP increased to 8 to maintain oxygen saturation per Dr. Danae Joseph.  Please see VS.  Time and rate sedatives resumed:     Sedative(s) were re-instituted per provider's order (see MAR) due to the presence of the criteria for failure of sedation interruption (check all that apply):     Check box  if present Criteria for Failure of Sedation Interruption     [] Sustained respiratory rate greater than 35 breaths/minute for 5 minutes or longer   [] Acute cardiac dysrhythmia   [x] SpO2 less than 88% for 5 minutes or longer   [x] Signs of increased work of breathing (accessory muscle use)   [x] RASS greater than or equal to +2   [] ICP increase to greater than 20 mm Hg   [] End Tidal CO2 increases or decreases by greater than 5 mm Hg       Electronically signed by Felicita Daniels RN on 4/23/2021 at 1:12 PM

## 2021-04-23 NOTE — PROGRESS NOTES
Department of Internal Medicine  General Internal Medicine  Attending Progress Note      SUBJECTIVE:    Seen and examined in the ICU  Sedated on the vent  On Levophed and propofol iv drips. In supine position this morning. Tolerating tube feeds.   Good urine output    Medications    Current Facility-Administered Medications: insulin glargine (LANTUS) injection vial 20 Units, 20 Units, Subcutaneous, BID  dexmedetomidine (PRECEDEX) 400 mcg in sodium chloride 0.9 % 100 mL infusion, 0.2-1.4 mcg/kg/hr, Intravenous, Continuous  buPROPion (WELLBUTRIN) tablet 100 mg, 100 mg, Per NG tube, BID  Gabapentin SOLN 100 mg, 100 mg, Enteral, Daily  levothyroxine (SYNTHROID) tablet 175 mcg, 175 mcg, Per NG tube, Daily  norepinephrine (LEVOPHED) 16 mg in dextrose 5 % 250 mL infusion, 2-100 mcg/min, Intravenous, Continuous  [START ON 4/24/2021] vancomycin (VANCOCIN) 1,000 mg in dextrose 5 % 250 mL IVPB, 1,000 mg, Intravenous, Once  dexamethasone (PF) (DECADRON) injection 6 mg, 6 mg, Intravenous, Q24H  fentaNYL 5 mcg/ml in 0.9%  ml infusion, 12.5-200 mcg/hr, Intravenous, Continuous  propofol injection, 5-50 mcg/kg/min (Order-Specific), Intravenous, Titrated  lidocaine PF 1 % injection 5 mL, 5 mL, Intradermal, Once  sodium chloride flush 0.9 % injection 5-40 mL, 5-40 mL, Intravenous, 2 times per day  sodium chloride flush 0.9 % injection 5-40 mL, 5-40 mL, Intravenous, PRN  0.9 % sodium chloride infusion, 25 mL, Intravenous, PRN  heparin flush 100 UNIT/ML injection 300 Units, 3 mL, Intravenous, 2 times per day  heparin flush 100 UNIT/ML injection 300 Units, 3 mL, Intracatheter, PRN  vancomycin (VANCOCIN) intermittent dosing (placeholder), , Other, RX Placeholder  cefepime (MAXIPIME) 2000 mg IVPB extended (mini-bag), 2,000 mg, Intravenous, Q12H  [COMPLETED] anidulafungin (ERAXIS) 200 mg in dextrose 5 % 260 mL IVPB, 200 mg, Intravenous, Once **AND** anidulafungin (ERAXIS) 100 mg in dextrose 5 % 130 mL IVPB, 100 mg, Intravenous, °C)  RESPIRATIONS RANGE: Resp  Av.2  Min: 22  Max: 42  PULSE RANGE: Pulse  Av.3  Min: 70  Max: 115  BLOOD PRESSURE RANGE:  Systolic (88VDJ), KLT:780 , Min:68 , CLA:133   ; Diastolic (94ABY), MQ, Min:39, Max:107    PULSE OXIMETRY RANGE: SpO2  Av.3 %  Min: 83 %  Max: 100 %  24HR INTAKE/OUTPUT:      Intake/Output Summary (Last 24 hours) at 2021 1907  Last data filed at 2021 1600  Gross per 24 hour   Intake 2220.36 ml   Output 1950 ml   Net 270.36 ml       CONSTITUTIONAL: Sedated with abrasions on the face. HEENT: Normocephalic atraumatic. Eyes closed, orally intubated  NECK: Supple, no lymphadenopathy,  no thyromegaly  LUNGS: diminished over the bases bilaterally. CARDIOVASCULAR: Regular rate and rhythm, no  rub or gallop. ABDOMEN: Soft,  bowel sounds are positive, no organomegaly appreciated. NEUROLOGIC: Sedated unable to fully assess. EXT: No  clubbing, or cyanosis. Trace edema.     CBC with Differential:    Lab Results   Component Value Date    WBC 20.8 2021    RBC 2.73 2021    HGB 8.3 2021    HCT 27.3 2021     2021    .0 2021    MCH 30.4 2021    MCHC 30.4 2021    RDW 17.6 2021    NRBC 1.0 2021    METASPCT 2.0 2021    LYMPHOPCT 7.0 2021    MONOPCT 4.0 2021    MYELOPCT 4.0 2021    BASOPCT 0.0 2021    MONOSABS 0.83 2021    LYMPHSABS 1.46 2021    EOSABS 0.00 2021    BASOSABS 0.00 2021     CMP:    Lab Results   Component Value Date     2021    K 3.8 2021     2021    CO2 24 2021    BUN 36 2021    CREATININE 1.7 2021    GFRAA 36 2021    LABGLOM 30 2021    GLUCOSE 72 2021    PROT 5.1 2021    LABALBU 2.4 2021    CALCIUM 8.8 2021    BILITOT 0.3 2021    ALKPHOS 493 2021    AST 74 2021    ALT 43 2021       ASSESSMENT AND PLAN      1.COVID associated pneumonia, s/p Remdesivir, Tocilizumab, invermectin , convalescent plasma. With most likely secondary bacterial/Fungal pneumonia,Currently on Cefepime , Eraxis and Vancomycin iv. ID following closely  On therapeutic anticoagulation with lovenox,  White cell count up today, recheck in am.  2.Acute respiratory failure secondary to above with possible ARDS, on decadron iv. ICU team following closely. On iv sedation for now. Proning per ICU team orders,  3,Sepsis secondary to above off  iv pressors. 4.DM type 2, insulin requiring, on Lantus bid and sliding scale insulin  5. Hypothyroidism levothyroxine 175 mcg daily. 6.Continue tube feeds, well tolerated so far. 7.Acute renal injury, creatinine up to 1.7, good urine output so far. Monitor creatinine closely.     Iris Patricio MD.  7:07 PM  4/23/2021

## 2021-04-23 NOTE — PROGRESS NOTES
CRITICAL CARE PROGRESS NOTE    The patient's case was discussed in multidisciplinary rounds including critical care specialist, nursing, RT and pharmacy. Her evaluation is as follows:       1) COVID-19 pneumonia with acute hypoxic respiratory failure,  ARDS and LEIGHTON  2) Morbid obesity with obesity hypoventilation syndrome  3) Type 2 diabetes  4) Hypertension  5) Dyslipidemia     --Remains on supine position --> Worsening infiltrates and leukocytosis  --Worsening LEIGHTON  --Cultures reviewed, no growth --> Continue vancomycin, cefepime and anidulafungin  --Management for COVID ARDS include dexamethasone  --Diabetes mellitus ->Decreased lantus as her dexamethasone dose is also reduced. --Has multiple scabs on face  --Worsening anemia, monitor hemoglobin q8h  --Goal today to stop propofol and transition to precedex, hopefully she will be calm enough for SBT  --Restarted wellbutrin and neurontin  --Hypothyroidism --> Restart levothyroxine    Acute hypoxemic respiratory failure secondary to COVID-19 pneumonia and ARDS  --Worsening infiltrates, had bloody sputum toda; tracheal aspirate sent for culture  --Continue with mechanical ventilation with lung protective strategy. --Continue antimicrobial regimen  --Cultures reviewed  --Spontaneous breathing trial today    Last 3 CMP:    Recent Labs     04/21/21  0557 04/22/21  0505 04/23/21  0551    141 142   K 4.9 4.5 3.8   * 106 109*   CO2 21* 25 24   BUN 28* 32* 36*   CREATININE 1.2* 1.5* 1.7*   GLUCOSE 234* 165* 72*   CALCIUM 9.0 9.4 8.8   PROT 6.0* 6.2* 5.1*   LABALBU 2.6* 2.8* 2.4*   BILITOT 0.4 0.4 0.3   ALKPHOS 396* 520* 493*   AST 54* 57* 74*   ALT 35* 40* 43*       Recent Labs     04/23/21  0551   WBC 17.9*   RBC 2.52*   HGB 7.9*   HCT 25.0*   MCV 99.2   MCH 31.3   MCHC 31.6*   RDW 17.3*   *   MPV 9.5       No results for input(s): BC in the last 72 hours. No results for input(s): Lanis Alyssa in the last 72 hours.     24 HR INTAKE/OUTPUT: Intake/Output Summary (Last 24 hours) at 4/23/2021 0951  Last data filed at 4/23/2021 2306  Gross per 24 hour   Intake 1615.36 ml   Output 2700 ml   Net -1084.64 ml     MEDICATIONS:   dexamethasone  6 mg Intravenous Q24H    insulin glargine  35 Units Subcutaneous BID    lidocaine PF  5 mL Intradermal Once    sodium chloride flush  5-40 mL Intravenous 2 times per day    heparin flush  3 mL Intravenous 2 times per day    vancomycin (VANCOCIN) intermittent dosing (placeholder)   Other RX Placeholder    cefepime  2,000 mg Intravenous Q12H    anidulafungin  100 mg Intravenous Q24H    pantoprazole  40 mg Intravenous Daily    And    sodium chloride (PF)  10 mL Intravenous Daily    enoxaparin  1 mg/kg Subcutaneous Q12H    ipratropium-albuterol  1 ampule Inhalation Q4H    insulin lispro  0-12 Units Subcutaneous Q6H    Vitamin D  5,000 Units Oral Daily    zinc sulfate  50 mg Oral Daily    sodium chloride flush  5-40 mL Intravenous BID      fentaNYL 5 mcg/ml in 0.9%  ml infusion 150 mcg/hr (04/23/21 0800)    propofol 20 mcg/kg/min (04/23/21 0605)    sodium chloride      [Held by provider] fentaNYL 5 mcg/ml in 0.9%  ml infusion 125 mcg/hr (04/19/21 0625)    [Held by provider] lactated ringers 50 mL/hr at 04/21/21 0213    dextrose       sodium chloride flush, sodium chloride, heparin flush, perflutren lipid microspheres, haloperidol lactate, hydrALAZINE, polyvinyl alcohol, glucose, dextrose, glucagon (rDNA), dextrose, acetaminophen **OR** acetaminophen, sodium chloride, sodium chloride flush, dextrose, trimethobenzamide    OBJECTIVE:  Vitals:    04/23/21 0900   BP: (!) 89/48   Pulse: 77   Resp: 24   Temp:    SpO2: 96%     FiO2 : (S) 40 %     O2 Device: Ventilator        LABS:  WBC   Date Value Ref Range Status   04/23/2021 17.9 (H) 4.5 - 11.5 E9/L Final   04/22/2021 20.1 (H) 4.5 - 11.5 E9/L Final   04/21/2021 19.2 (H) 4.5 - 11.5 E9/L Final     Hemoglobin   Date Value Ref Range Status Total Protein   Date Value Ref Range Status   04/23/2021 5.1 (L) 6.4 - 8.3 g/dL Final   04/22/2021 6.2 (L) 6.4 - 8.3 g/dL Final   04/21/2021 6.0 (L) 6.4 - 8.3 g/dL Final     Albumin   Date Value Ref Range Status   04/23/2021 2.4 (L) 3.5 - 5.2 g/dL Final   04/22/2021 2.8 (L) 3.5 - 5.2 g/dL Final   04/21/2021 2.6 (L) 3.5 - 5.2 g/dL Final     Total Bilirubin   Date Value Ref Range Status   04/23/2021 0.3 0.0 - 1.2 mg/dL Final   04/22/2021 0.4 0.0 - 1.2 mg/dL Final   04/21/2021 0.4 0.0 - 1.2 mg/dL Final     Alkaline Phosphatase   Date Value Ref Range Status   04/23/2021 493 (H) 35 - 104 U/L Final   04/22/2021 520 (H) 35 - 104 U/L Final   04/21/2021 396 (H) 35 - 104 U/L Final     AST   Date Value Ref Range Status   04/23/2021 74 (H) 0 - 31 U/L Final     Comment:     Specimen is slightly Hemolyzed. Result may be artificially increased. 04/22/2021 57 (H) 0 - 31 U/L Final     Comment:     Specimen is slightly Hemolyzed. Result may be artificially increased. 04/21/2021 54 (H) 0 - 31 U/L Final     Comment:     Specimen is slightly Hemolyzed. Result may be artificially increased. ALT   Date Value Ref Range Status   04/23/2021 43 (H) 0 - 32 U/L Final   04/22/2021 40 (H) 0 - 32 U/L Final   04/21/2021 35 (H) 0 - 32 U/L Final     GFR Non-   Date Value Ref Range Status   04/23/2021 30 >=60 mL/min/1.73 Final     Comment:     Chronic Kidney Disease: less than 60 ml/min/1.73 sq.m. Kidney Failure: less than 15 ml/min/1.73 sq.m. Results valid for patients 18 years and older. 04/22/2021 34 >=60 mL/min/1.73 Final     Comment:     Chronic Kidney Disease: less than 60 ml/min/1.73 sq.m. Kidney Failure: less than 15 ml/min/1.73 sq.m. Results valid for patients 18 years and older. 04/21/2021 44 >=60 mL/min/1.73 Final     Comment:     Chronic Kidney Disease: less than 60 ml/min/1.73 sq.m. Kidney Failure: less than 15 ml/min/1.73 sq.m.   Results valid for patients 18 years and older.       GFR    Date Value Ref Range Status   04/23/2021 36  Final   04/22/2021 41  Final   04/21/2021 53  Final     Magnesium   Date Value Ref Range Status   04/23/2021 2.0 1.6 - 2.6 mg/dL Final   04/22/2021 2.0 1.6 - 2.6 mg/dL Final   04/15/2021 1.8 1.6 - 2.6 mg/dL Final     Phosphorus   Date Value Ref Range Status   04/15/2021 1.7 (L) 2.5 - 4.5 mg/dL Final   04/14/2021 2.5 2.5 - 4.5 mg/dL Final   04/13/2021 2.3 (L) 2.5 - 4.5 mg/dL Final     No results for input(s): PH, PO2, PCO2, HCO3, BE, O2SAT in the last 72 hours. RADIOLOGY:  XR CHEST PORTABLE   Final Result   Bilateral lower lobe airspace opacities. XR CHEST PORTABLE   Final Result   1. Multifocal bilateral airspace disease. In comparison with the prior study   of 04/18/2021 the airspace disease within the left upper lobe is more   extensive. .         XR ABDOMEN FOR NG/OG/NE TUBE PLACEMENT   Final Result   Nasogastric tube with the tip in the stomach. XR CHEST PORTABLE   Final Result   No change in bilateral airspace opacities. May represent ongoing pneumonia   versus pulmonary edema. Enteric feeding tube terminates in the distal esophagus. Recommend   advancement. XR CHEST PORTABLE   Final Result   Mild cardiomegaly and moderate pulmonary edema which is more prominent with   increasing opacities throughout both lungs. Questionable small bibasilar pleural effusions which are unchanged. No change in the tubes and catheters. XR CHEST PORTABLE   Final Result   1. NG tube is present. Distal aspect of NG tube is not visualized. Short-term follow-up recommended. 2. Endotracheal tube is approximately 3 cm above the zaynab. 3. Stable airspace opacities throughout both lungs suggesting bilateral   pneumonia. XR CHEST PORTABLE   Final Result   Nasogastric tube terminates in the distal esophagus and could be further   advanced for more optimal positioning.       Status post intubation. Extensive bilateral infiltrates are similar to previous. Continued follow-up   recommended. XR CHEST PORTABLE   Final Result   1. There is no interval change in extensive multifocal bilateral airspace   disease. XR CHEST PORTABLE   Final Result   Moderate bilateral pulmonary opacities favored to be due to multifocal   pneumonia unchanged since yesterday. US DUP LOWER EXTREMITIES BILATERAL VENOUS   Final Result   No evidence of DVT in either lower extremity given the limitations detailed   above. CTA CHEST W CONTRAST   Final Result   Extensive bilateral pulmonary infiltrates may reflect pneumonia or pulmonary   edema         XR CHEST PORTABLE   Final Result   Multifocal bilateral pneumonia. XR CHEST PORTABLE    (Results Pending)     PROBLEM LIST:  Principal Problem:    COVID-19  Active Problems:    HTN (hypertension)    Type 2 diabetes mellitus ([facility] [address]    Respiratory failure ([facility] [address]  Resolved Problems:    * No resolved hospital problems. *    BP (!) 89/48   Pulse 77   Temp 98.1 °F (36.7 °C) (Core)   Resp 24   Ht 5' 2\" (1.575 m)   Wt 224 lb (101.6 kg)   SpO2 96%   BMI 40.97 kg/m²     General:  Lethargic, stares, agitated when sedation is decreased, with multiple scabs on face  HEENT: No head lesions, PERRL, EOMI, mouth withscabs, ETT present  Respiratory: Lungs with equal breath sounds bilaterally, no adventitious sounds auscultated, no accessory muscle use  CV: Regular rate, no murmurs, no JVD, trace leg edema  Abdomen: Soft, non tender, + bowel sound  Skin: Hydrated, adequate turgor, no rash, capillary refill <2 seconds  Extremities: Moves 4 limbs spontaneously, distal pulses present  Neurology: Lethargic, moves 4 limbs on spontaneously, neck is supple, no meningitic signs present.       ATTESTATION:  ICU Staff Physician note of personal involvement in Care  As the attending physician, I certify that I personally reviewed the patients history and personnally examined the patient to confirm the physical findings described above,  And that I reviewed the relevant imaging studies and available reports. I also discussed the differential diagnosis and all of the proposed management plans with the patient and individuals accompanying the patient to this visit. They had the opportunity to ask questions about the proposed management plans and to have those questions answered. This patient has a high probability of sudden, clinically significant deterioration, which requires the highest level of physician preparedness to intervene urgently. I managed/supervised life or organ supporting interventions that required frequent physician assessment. I devoted my full attention to the direct care of this patient for the amount of time indicated below. Time I spent with the family or surrogate(s) is included only if the patient was incapable of providing the necessary information or participating in medical decisions - Time devoted to teaching and to any procedures I billed separately is not included.      CRITICAL CARE TIME:  30 minutes    Amy Kelley MD  Pulmonary and Critical Care Medicine

## 2021-04-23 NOTE — PROGRESS NOTES
[facility]  [facility]  Progress Note    NAME: Ernestina Parada  MR:  [id number]  :   1950  DATE OF SERVICE:21    This is a face to face encounter with Ernestina Parada 69 y.o. female on 21  Not exmamined discussed with ICU NURSE  CHIEF COMPLAINT     ID following for   Chief Complaint   Patient presents with    Respiratory Distress     RECENT HX OF COVID      HISTORYOF PRESENT ILLNESS   Pt in icu   On vent   supine      ac40%  Vent sedated     REVIEW OF SYSTEMS     As stated above in the chief complaint, otherwise negative.   CURRENT MEDICATIONS     Scheduled Meds:   insulin glargine  20 Units Subcutaneous BID    buPROPion  100 mg Per NG tube BID    Gabapentin  100 mg Enteral Daily    dexamethasone  6 mg Intravenous Q24H    lidocaine PF  5 mL Intradermal Once    sodium chloride flush  5-40 mL Intravenous 2 times per day    heparin flush  3 mL Intravenous 2 times per day    vancomycin (VANCOCIN) intermittent dosing (placeholder)   Other RX Placeholder    cefepime  2,000 mg Intravenous Q12H    anidulafungin  100 mg Intravenous Q24H    pantoprazole  40 mg Intravenous Daily    And    sodium chloride (PF)  10 mL Intravenous Daily    enoxaparin  1 mg/kg Subcutaneous Q12H    ipratropium-albuterol  1 ampule Inhalation Q4H    insulin lispro  0-12 Units Subcutaneous Q6H    Vitamin D  5,000 Units Oral Daily    zinc sulfate  50 mg Oral Daily    sodium chloride flush  5-40 mL Intravenous BID     Continuous Infusions:   dexmedetomidine HCl in NaCl      fentaNYL 5 mcg/ml in 0.9%  ml infusion 150 mcg/hr (21 0800)    propofol 20 mcg/kg/min (21 0605)    sodium chloride      [Held by provider] fentaNYL 5 mcg/ml in 0.9%  ml infusion 125 mcg/hr (21 0625)    [Held by provider] lactated ringers 50 mL/hr at 21 0213    dextrose       PRN Meds:sodium chloride flush, sodium chloride, heparin flush, perflutren lipid microspheres, hydrALAZINE, polyvinyl alcohol, glucose, dextrose, glucagon (rDNA), dextrose, acetaminophen **OR** acetaminophen, sodium chloride, sodium chloride flush, dextrose, trimethobenzamide    PHYSICAL EXAM     BP (!) 89/48   Pulse 77   Temp 98.1 °F (36.7 °C) (Core)   Resp 24   Ht 5' 2\" (1.575 m)   Wt 224 lb (101.6 kg)   SpO2 96%   BMI 40.97 kg/m²   Temp  Av.2 °F (36.8 °C)  Min: 97.9 °F (36.6 °C)  Max: 98.8 °F (37.1 °C)  Constitutional:  The patient is on vent proned  Cns sedated  Lines:  picc   [place] yellow   No change      DIAGNOSTIC RESULTS   Radiology:  Laboratory and Tests Review:  Lab Results   Component Value Date    WBC 17.9 (H) 2021    WBC 20.1 (H) 2021    WBC 19.2 (H) 2021    HGB 7.9 (L) 2021    HCT 25.0 (L) 2021    MCV 99.2 2021     (H) 2021     No results found for: [facility]  Lab Results   Component Value Date    ALT 43 (H) 2021    AST 74 (H) 2021    ALKPHOS 493 (H) 2021    BILITOT 0.3 2021     Lab Results   Component Value Date     2021    K 3.8 2021     2021    CO2 24 2021    BUN 36 2021    CREATININE 1.7 2021    CREATININE 1.5 2021    CREATININE 1.2 2021    GFRAA 36 2021    LABGLOM 30 2021    GLUCOSE 72 2021    PROT 5.1 2021    LABALBU 2.4 2021    CALCIUM 8.8 2021    BILITOT 0.3 2021    ALKPHOS 493 2021    AST 74 2021    ALT 43 2021     Lab Results   Component Value Date    CRP 2.9 (H) 2021    CRP 8.5 (H) 04/15/2021    CRP 14.9 (H) 2021     Lab Results   Component Value Date    SEDRATE 65 (H) 2021     Recent Labs     21  0557 21  0505 21  0551   AST 54* 57* 74*   ALT 35* 40* 43*   TRIG 285*  --   --      Lab Results   Component Value Date    TRIG 285 2021     Lab Results   Component Value Date/Time    VITD25 11 (L) 2021 04:57 AM       Microbiology:     FINAL IMPRESSION    Leukocytosis/fevers  Intermittent   Follow cbc blood cx4/17 pending   sepsis  covid pneumonia severe with poss bacterial pneumonia   -s/p TOCI  -s/p remdesivir  -s/p ivermectin  S/p convalescent plasma x2  RLE cellulitis   Vitamin D deficiency  pennie cr1.2        dexamethasone (PF) (DECADRON) injection 6 mg, Q8H  vancomycin (VANCOCIN) intermittent dosing (placeholder), RX Placeholder  cefepime (MAXIPIME) 2000 mg IVPB extended (mini-bag), Q12H  anidulafungin (ERAXIS) 100 mg in dextrose 5 % 130 mL IVPB, Q24H     · Monitor labs  · cx ngtd  No change in rx plan   Will stop atbx 4/25  Imaging and labs were reviewed per medical records.         Electronically signed by Beatris Solorzano MD on 4/23/2021 at 10:00 AM

## 2021-04-23 NOTE — CARE COORDINATION
4/23/21 Positive covid 4/13/21. Cm transition of care: Icu/vent/sedation. If LTACH would need to work with [facility] to approve and either Ciera or Asya Marroquin would be able to follow if approved. No choice yet- until discharge becomes clearer. CM/SS to follow.  Electronically signed by ELEAZAR Seymour on 4/23/2021 at 11:25 AM

## 2021-04-23 NOTE — PROGRESS NOTES
Sedation interruption performed. Time sedative infusions stopped: 1040  Time of wake up: 1045  RASS: RASS Score: Agitated  BP (!) 157/74   Pulse 115   Temp 98.1 °F (36.7 °C) (Core)   Resp 28   Ht 5' 2\" (1.575 m)   Wt 224 lb (101.6 kg)   SpO2 84%   BMI 40.97 kg/m² Neurological assessment: Patient alert, unable to follow commands but displays agitation and frequent non-purposeful movement. See vital signs.   Time and rate sedatives resumed: 1050      Sedative(s) were re-instituted per provider's order (see MAR) due to the presence of the criteria for failure of sedation interruption (check all that apply):     Check box  if present Criteria for Failure of Sedation Interruption     [] Sustained respiratory rate greater than 35 breaths/minute for 5 minutes or longer   [] Acute cardiac dysrhythmia   [x] SpO2 less than 88% for 5 minutes or longer   [x] Signs of increased work of breathing (accessory muscle use)   [x] RASS greater than or equal to +2   [] ICP increase to greater than 20 mm Hg   [] End Tidal CO2 increases or decreases by greater than 5 mm Hg       Electronically signed by Na Gomez RN on 4/23/2021 at 10:57 AM

## 2021-04-24 NOTE — PLAN OF CARE
Problem: Isolation Precautions - Risk of Spread of Infection  Goal: Prevent transmission of infection  Outcome: Met This Shift     Problem: Nutrition Deficits  Goal: Optimize nutritional status  Outcome: Met This Shift     Problem: Falls - Risk of:  Goal: Will remain free from falls  Description: Will remain free from falls  4/24/2021 1257 by Erlinda Olsen RN  Outcome: Met This Shift  4/24/2021 0804 by Meera David RN  Outcome: Met This Shift  Goal: Absence of physical injury  Description: Absence of physical injury  4/24/2021 1257 by Erlinda Olsen RN  Outcome: Met This Shift  4/24/2021 0804 by Meera David RN  Outcome: Met This Shift     Problem: Non-Violent Restraints  Goal: No harm/injury to patient while restraints in use  4/24/2021 1257 by Erlinda Olsen RN  Outcome: Met This Shift  4/24/2021 0804 by Meera David RN  Outcome: Met This Shift  Goal: Patient's dignity will be maintained  4/24/2021 1257 by Erlinda Olsen RN  Outcome: Met This Shift  4/24/2021 0804 by Meera David RN  Outcome: Met This Shift     Problem: Gas Exchange - Impaired  Goal: Absence of hypoxia  Outcome: Not Met This Shift     Problem: Breathing Pattern - Ineffective  Goal: Ability to achieve and maintain a regular respiratory rate  Outcome: Not Met This Shift     Problem: Non-Violent Restraints  Goal: Removal from restraints as soon as assessed to be safe  4/24/2021 1257 by Erlinda Olsen RN  Outcome: Not Met This Shift  4/24/2021 0804 by Meera David RN  Outcome: Not Met This Shift

## 2021-04-24 NOTE — PROGRESS NOTES
Pharmacy Consultation Note  (Antibiotic Dosing and Monitoring)    Initial consult date:   Consulting physician: Dr Dread Davies  Drug(s): Vancomycin IV  Indication: Cellulitis    Ht Readings from Last 1 Encounters:   21 5' 2\" (1.575 m)     Wt Readings from Last 1 Encounters:   21 224 lb 1.6 oz (101.7 kg)     Age/  Gender Actual BW IBW DW  Allergy Information   69 y.o.   female 95.3 kg 50.1 kg 68.2 kg  Patient has no known allergies. Date  WBC BUN/CR UOP (mL/kg/hr) Drug/Dose Time   Given Level(s)   (Time) Comments     (#1) 17.0 37/1.4 -- Vancomycin 1250 mg IV x 1 2230       (#2) 9.8 34/1.2 0.7 Vancomycin 1000 mg IV Q24H 2203  Pharmacy Consulted     (#3) 13.5 29/1.1 0.6 Vancomycin 1000 mg IV Q24H 2123     4/15  (#4) 10.6 28/1.0 0.8 Vancomycin 1000 mg IV Q24H  Trough @ 0006 = 6.5 mcg/mL      (#5) 16.1 28/0.9 0.5 Vancomycin 1000 mg IV Q18H 0256  1957       (#6) 15.7 24/1.1 0.5 Vancomycin 1000 mg IV Q18H 1445       (#7) 12.3 23/1.2 0.4 Vancomycin 1000 mg IV Q18H 0854  Hold dose if trough is >20 mcg/mL     (#8) 13.4 25/1.4 1 Vancomycin 1000 mg IV Q18H 0330   Trough @ 7 = 24.7 mcg/mL Hold dose if trough is >20 mcg/mL     (#9) 13.6 24/1.2 1.8 No dose -- 20.1 mcg/mL @ 0543      (#10) 19.2 28/1. 2 1 Vancomycin 1000 mg IV x 1 0604       (#11) 20.5 32/1.5 1.9 No dose -- Random @ 0505 = 25.4 mcg/mL      (#12) 17.9 36/1.7 1.1 No dose -- Random @ 0551 = 19 mcg/mL      (#13) 16.0 39/1.6 0.4 Vancomycin 1000 mg IV x 1 0646       Estimated Creatinine Clearance: 36 mL/min (A) (based on SCr of 1.6 mg/dL (H)). UOP over the past 24 hours:       Intake/Output Summary (Last 24 hours) at 2021 1412  Last data filed at 2021 0646  Gross per 24 hour   Intake 1624 ml   Output 1000 ml   Net 624 ml       Temp max: Temp (24hrs), Av.7 °F (36.5 °C), Min:96.8 °F (36 °C), Max:98.2 °F (36.8 °C)      Other anti-infectives:   Antibiotic Dose Date Initiated Date Stopped

## 2021-04-24 NOTE — FLOWSHEET NOTE
Informed  Dr Yareli Bonilla of hgb 7.2 also informed of tube feeding residual of 400cc order received.

## 2021-04-24 NOTE — PROGRESS NOTES
Department of Internal Medicine  General Internal Medicine  Attending Progress Note      SUBJECTIVE:    Seen and examined in the ICU  Sedated on the vent  On Levophed and propofol iv drips. In supine position this morning.   400 cc residual and tube feed held and restarted at 20cc/hr and advance gradually  Good urine output    Medications    Current Facility-Administered Medications: [START ON 4/25/2021] insulin glargine (LANTUS) injection vial 5 Units, 5 Units, Subcutaneous, Nightly  dextrose 5 % and 0.45 % sodium chloride infusion, , Intravenous, Continuous  dexmedetomidine (PRECEDEX) 400 mcg in sodium chloride 0.9 % 100 mL infusion, 0.2-1.4 mcg/kg/hr, Intravenous, Continuous  buPROPion (WELLBUTRIN) tablet 100 mg, 100 mg, Per NG tube, BID  Gabapentin SOLN 100 mg, 100 mg, Enteral, Daily  levothyroxine (SYNTHROID) tablet 175 mcg, 175 mcg, Per NG tube, Daily  norepinephrine (LEVOPHED) 16 mg in dextrose 5 % 250 mL infusion, 2-100 mcg/min, Intravenous, Continuous  dexamethasone (PF) (DECADRON) injection 6 mg, 6 mg, Intravenous, Q24H  fentaNYL 5 mcg/ml in 0.9%  ml infusion, 12.5-200 mcg/hr, Intravenous, Continuous  propofol injection, 5-50 mcg/kg/min (Order-Specific), Intravenous, Titrated  lidocaine PF 1 % injection 5 mL, 5 mL, Intradermal, Once  sodium chloride flush 0.9 % injection 5-40 mL, 5-40 mL, Intravenous, 2 times per day  sodium chloride flush 0.9 % injection 5-40 mL, 5-40 mL, Intravenous, PRN  0.9 % sodium chloride infusion, 25 mL, Intravenous, PRN  heparin flush 100 UNIT/ML injection 300 Units, 3 mL, Intravenous, 2 times per day  heparin flush 100 UNIT/ML injection 300 Units, 3 mL, Intracatheter, PRN  vancomycin (VANCOCIN) intermittent dosing (placeholder), , Other, RX Placeholder  cefepime (MAXIPIME) 2000 mg IVPB extended (mini-bag), 2,000 mg, Intravenous, Q12H  [COMPLETED] anidulafungin (ERAXIS) 200 mg in dextrose 5 % 260 mL IVPB, 200 mg, Intravenous, Once **AND** anidulafungin (ERAXIS) 100 mg in dextrose 5 % 130 mL IVPB, 100 mg, Intravenous, Q24H  pantoprazole (PROTONIX) injection 40 mg, 40 mg, Intravenous, Daily **AND** sodium chloride (PF) 0.9 % injection 10 mL, 10 mL, Intravenous, Daily  enoxaparin (LOVENOX) injection 90 mg, 1 mg/kg, Subcutaneous, Q12H  perflutren lipid microspheres (DEFINITY) injection 1.65 mg, 1.5 mL, Intravenous, ONCE PRN  ipratropium-albuterol (DUONEB) nebulizer solution 1 ampule, 1 ampule, Inhalation, Q4H  [Held by provider] fentaNYL 5 mcg/ml in 0.9%  ml infusion, 12.5-200 mcg/hr, Intravenous, Continuous  insulin lispro (HUMALOG) injection vial 0-12 Units, 0-12 Units, Subcutaneous, Q6H  hydrALAZINE (APRESOLINE) injection 10 mg, 10 mg, Intravenous, Q6H PRN  Vitamin D (CHOLECALCIFEROL) tablet 5,000 Units, 5,000 Units, Oral, Daily  polyvinyl alcohol (LIQUIFILM TEARS) 1.4 % ophthalmic solution 2 drop, 2 drop, Both Eyes, PRN  [Held by provider] lactated ringers infusion, , Intravenous, Continuous  glucose (GLUTOSE) 40 % oral gel 15 g, 15 g, Oral, PRN  dextrose 50 % IV solution, 12.5 g, Intravenous, PRN  glucagon (rDNA) injection 1 mg, 1 mg, Intramuscular, PRN  dextrose 5 % solution, 100 mL/hr, Intravenous, PRN  acetaminophen (TYLENOL) tablet 650 mg, 650 mg, Oral, Q6H PRN **OR** acetaminophen (TYLENOL) suppository 650 mg, 650 mg, Rectal, Q6H PRN  0.9 % sodium chloride bolus, 30 mL, Intravenous, PRN  zinc sulfate (ZINCATE) capsule 50 mg, 50 mg, Oral, Daily  sodium chloride flush 0.9 % injection 5-40 mL, 5-40 mL, Intravenous, PRN  sodium chloride flush 0.9 % injection 5-40 mL, 5-40 mL, Intravenous, BID  dextrose 50 % IV solution, 12.5 g, Intravenous, PRN  trimethobenzamide (TIGAN) injection 200 mg, 200 mg, Intramuscular, Q6H PRN    Physical    VITALS:  BP (!) 118/58   Pulse 60   Temp 96.8 °F (36 °C) (Core)   Resp 23   Ht 5' 2\" (1.575 m)   Wt 224 lb 1.6 oz (101.7 kg)   SpO2 96%   BMI 40.99 kg/m²   TEMPERATURE:  Current - Temp: 96.8 °F (36 °C);  Max - Temp  Av.7 °F (36.5 °C) ASSESSMENT AND PLAN      1.COVID associated pneumonia, s/p Remdesivir, Tocilizumab, invermectin , convalescent plasma. With most likely secondary bacterial/Fungal pneumonia,Currently on Cefepime , Eraxis and Vancomycin iv. ID following closely  On therapeutic anticoagulation with lovenox,  White cell count trending down  2. Acute respiratory failure secondary to above with possible ARDS, on decadron iv. ICU team following closely. On iv sedation for now. Proning per ICU team orders,  Requiring 100% FiO2.  3,Sepsis secondary to above on iv levophed. 4.DM type 2, insulin requiring, on Lantus bid and sliding scale insulin  5. Hypothyroidism levothyroxine 175 mcg daily. 6.High residual this morning,  tube feeds restarted at 20 cc/hr and advance gradually. 7.Acute renal injury, creatinine 1.6 , good urine output so far. Monitor creatinine closely.   8.Anemia multifactorial, secondary to sepsis, bone marrow suppression from iv antibiotics and blood draws, monitor Hgb    Aure Ang MD.  3:50 PM  4/24/2021

## 2021-04-24 NOTE — PROGRESS NOTES
[address] [facility]  [facility]  Progress Note    Chief Complaint   Patient presents with    Respiratory Distress     RECENT HX OF COVID      SUBJECTIVE  Seen in ICU  On vent  Did not examine due to COVID19 pneumonia - discussed with ICU nurse   Tube feedings on hold due to high residual   Tolerating medications - no side effects     REVIEW OF SYSTEMS     As stated above in the chief complaint, otherwise negative.     PHYSICAL EXAM     BP (!) 118/58   Pulse 60   Temp 96.8 °F (36 °C) (Core)   Resp 23   Ht 5' 2\" (1.575 m)   Wt 224 lb 1.6 oz (101.7 kg)   SpO2 96%   BMI 40.99 kg/m²   Temp  Av.7 °F (36.5 °C)  Min: 96.8 °F (36 °C)  Max: 98.2 °F (36.8 °C)  Constitutional:  The patient is on vent proned  Cns sedated  Lines:  picc   [place] yellow      DIAGNOSTIC RESULTS   Radiology:  Laboratory and Tests Review:  Lab Results   Component Value Date    WBC 14.4 (H) 2021    WBC 16.0 (H) 2021    WBC 13.8 (H) 2021    HGB 7.1 (L) 2021    HCT 23.1 (L) 2021    MCV 99.6 2021     2021     No results found for: [facility]  Lab Results   Component Value Date    ALT 36 (H) 2021    AST 66 (H) 2021    ALKPHOS 413 (H) 2021    BILITOT 0.2 2021     Lab Results   Component Value Date     2021    K 4.1 2021    K 3.8 2021     2021    CO2 25 2021    BUN 39 2021    CREATININE 1.6 2021    CREATININE 1.7 2021    CREATININE 1.5 2021    GFRAA 38 2021    LABGLOM 32 2021    GLUCOSE 62 2021    PROT 5.1 2021    LABALBU 2.3 2021    CALCIUM 8.6 2021    BILITOT 0.2 2021    ALKPHOS 413 2021    AST 66 2021    ALT 36 2021     Lab Results   Component Value Date    CRP 2.9 (H) 2021    CRP 8.5 (H) 04/15/2021    CRP 14.9 (H) 2021     Lab Results   Component Value Date    SEDRATE 65 (H) 2021     Recent Labs 04/22/21  0505 04/23/21  0551 04/24/21  0632 04/24/21  0732   AST 57* 74* 66*  --    ALT 40* 43* 36*  --    TRIG  --   --   --  241*     Lab Results   Component Value Date    TRIG 241 04/24/2021     Lab Results   Component Value Date/Time    VITD25 11 (L) 04/13/2021 04:57 AM       Microbiology:     FINAL IMPRESSION    Leukocytosis/fevers  Intermittent   Follow cbc blood cx4/17 pending   sepsis  covid pneumonia severe with poss bacterial pneumonia   -s/p TOCI  -s/p remdesivir  -s/p ivermectin  S/p convalescent plasma x2  RLE cellulitis   Vitamin D deficiency  pennie      PLAN   dexamethasone (PF) (DECADRON) injection 6 mg, Q8H  vancomycin (VANCOCIN) intermittent dosing (placeholder), RX Placeholder  cefepime (MAXIPIME) 2000 mg IVPB extended (mini-bag), Q12H  anidulafungin (ERAXIS) 100 mg in dextrose 5 % 130 mL IVPB, Q24H     · Monitor labs  · cx ngtd  Will stop atbx 4/25      Electronically signed by JOHNNY Quintero - NP on 4/24/2021 at 3:27 PM     Patient examined along with the nurse practitioner  Agree with above  Patient with right lower extremity cellulitis  History of COVID-19 pneumonia    I have discussed the case, including pertinent history and physical  exam findings . I have seen and examined the patient and the key elements of the encounter have been performed by me. I agree with the assessment, plan and orders as documented.       Treatment plan as per my recommendation     Duy Oh MD, [address]  4/24/2021  3:47 PM

## 2021-04-24 NOTE — PROGRESS NOTES
CRITICAL CARE PROGRESS NOTE    The patient's case was discussed in multidisciplinary rounds including critical care specialist, nursing, RT and pharmacy. Her evaluation is as follows:     --Worsening hypoxemia  --Hypoglycemic overnight, lantus was held --> Start D5 1/2NS at 50 cc/h  --Tube feedings held due to gastric residuals of 400 ml      1) COVID-19 pneumonia with acute hypoxic respiratory failure,  ARDS and LEIGHTON  --Worsening hypoxemia with PEEP 12 --> Prone position   --Worsening LEIGHTON  --Cultures reviewed, no growth --> Continue vancomycin, cefepime and anidulafungin  --Management for COVID ARDS include dexamethasone  --Worsening infiltrates, had bloody sputum toda; tracheal aspirate sent for culture  --Continue with mechanical ventilation with lung protective strategy. --Continue antimicrobial regimen    Diabetes mellitus ->Decreased lantus as her dexamethasone dose is also reduced. Macrocytic anemia  --Monitor, transfusion if Hb <7    Delirium  --Continue propofol and precedex. Check triglycerides  --Restarted wellbutrin and neurontin    Hypothyroidism   --Continue levothyroxine     Morbid obesity with obesity hypoventilation syndrome  --Supportive care     Hypertension  --Medications on hold due to hypotension     BP (!) 105/55   Pulse 63   Temp 96.8 °F (36 °C) (Core)   Resp 20   Ht 5' 2\" (1.575 m)   Wt 224 lb 1.6 oz (101.7 kg)   SpO2 95%   BMI 40.99 kg/m²   General: Comatose, tries to close her eyelids when I open them, withdraws to pain in 4 limbs.    HEENT: No head lesions, face with multiple scabs on chin, PERRL, EOMI, mouth without lesions, intubated, no cervical adenopathy palpated  Respiratory: Lungs with diminished breath sounds bilaterally, no adventitious sounds auscultated, no accessory muscle use  CV: Regular rate, no murmurs, no JVD, 1+ leg edema  Abdomen: Soft, apparently non tender, I did not appreciate bowel sounds  Skin: Hydrated, adequate turgor, no rash, capillary refill <2 seconds  Extremities: Withdraws to pain in 4 limbs, distal pulses present  Neurology: Comatose, withdraws to pain, neck is supple, no meningitic signs present. Last 3 CMP:    Recent Labs     04/22/21  0505 04/23/21  0551 04/24/21  0632    142 146   K 4.5 3.8 4.1    109* 110*   CO2 25 24 25   BUN 32* 36* 39*   CREATININE 1.5* 1.7* 1.6*   GLUCOSE 165* 72* 62*   CALCIUM 9.4 8.8 8.6   PROT 6.2* 5.1* 5.1*   LABALBU 2.8* 2.4* 2.3*   BILITOT 0.4 0.3 0.2   ALKPHOS 520* 493* 413*   AST 57* 74* 66*   ALT 40* 43* 36*       Recent Labs     04/24/21  0632   WBC 16.0*   RBC 2.38*   HGB 7.4*   HCT 23.8*   .0*   MCH 31.1   MCHC 31.1*   RDW 17.4*      MPV 9.4       No results for input(s): BC in the last 72 hours. No results for input(s): Glorine Hover in the last 72 hours.     24 HR INTAKE/OUTPUT:      Intake/Output Summary (Last 24 hours) at 4/24/2021 0947  Last data filed at 4/24/2021 0646  Gross per 24 hour   Intake 2094 ml   Output 1000 ml   Net 1094 ml     MEDICATIONS:   buPROPion  100 mg Per NG tube BID    Gabapentin  100 mg Enteral Daily    levothyroxine  175 mcg Per NG tube Daily    insulin glargine  10 Units Subcutaneous BID    dexamethasone  6 mg Intravenous Q24H    lidocaine PF  5 mL Intradermal Once    sodium chloride flush  5-40 mL Intravenous 2 times per day    heparin flush  3 mL Intravenous 2 times per day    vancomycin (VANCOCIN) intermittent dosing (placeholder)   Other RX Placeholder    cefepime  2,000 mg Intravenous Q12H    anidulafungin  100 mg Intravenous Q24H    pantoprazole  40 mg Intravenous Daily    And    sodium chloride (PF)  10 mL Intravenous Daily    enoxaparin  1 mg/kg Subcutaneous Q12H    ipratropium-albuterol  1 ampule Inhalation Q4H    insulin lispro  0-12 Units Subcutaneous Q6H    Vitamin D  5,000 Units Oral Daily    zinc sulfate  50 mg Oral Daily    sodium chloride flush  5-40 mL Intravenous BID      dexmedetomidine HCl in NaCl 1.4 mcg/kg/hr (04/24/21 0915)    norepinephrine Stopped (04/24/21 0830)    fentaNYL 5 mcg/ml in 0.9%  ml infusion Stopped (04/23/21 1300)    propofol 25 mcg/kg/min (04/24/21 0923)    sodium chloride      [Held by provider] fentaNYL 5 mcg/ml in 0.9%  ml infusion 125 mcg/hr (04/19/21 0625)    [Held by provider] lactated ringers 50 mL/hr at 04/21/21 0213    dextrose       sodium chloride flush, sodium chloride, heparin flush, perflutren lipid microspheres, hydrALAZINE, polyvinyl alcohol, glucose, dextrose, glucagon (rDNA), dextrose, acetaminophen **OR** acetaminophen, sodium chloride, sodium chloride flush, dextrose, trimethobenzamide    OBJECTIVE:  Vitals:    04/24/21 0900   BP: (!) 179/81   Pulse: 86   Resp: (!) 32   Temp:    SpO2: (!) 85%     FiO2 : 100 %     O2 Device: Ventilator        LABS:  WBC   Date Value Ref Range Status   04/24/2021 16.0 (H) 4.5 - 11.5 E9/L Final   04/23/2021 13.8 (H) 4.5 - 11.5 E9/L Final   04/23/2021 20.8 (H) 4.5 - 11.5 E9/L Final     Hemoglobin   Date Value Ref Range Status   04/24/2021 7.4 (L) 11.5 - 15.5 g/dL Final   04/23/2021 7.2 (L) 11.5 - 15.5 g/dL Final   04/23/2021 8.3 (L) 11.5 - 15.5 g/dL Final     Hematocrit   Date Value Ref Range Status   04/24/2021 23.8 (L) 34.0 - 48.0 % Final   04/23/2021 23.0 (L) 34.0 - 48.0 % Final   04/23/2021 27.3 (L) 34.0 - 48.0 % Final     MCV   Date Value Ref Range Status   04/24/2021 100.0 (H) 80.0 - 99.9 fL Final   04/23/2021 98.3 80.0 - 99.9 fL Final   04/23/2021 100.0 (H) 80.0 - 99.9 fL Final     Platelets   Date Value Ref Range Status   04/24/2021 363 130 - 450 E9/L Final   04/23/2021 356 130 - 450 E9/L Final   04/23/2021 470 (H) 130 - 450 E9/L Final     Sodium   Date Value Ref Range Status   04/24/2021 146 132 - 146 mmol/L Final   04/23/2021 142 132 - 146 mmol/L Final   04/22/2021 141 132 - 146 mmol/L Final     Potassium   Date Value Ref Range Status   04/24/2021 4.1 3.5 - 5.0 mmol/L Final     Potassium reflex Magnesium   Date Value Ref Range Status   04/23/2021 3.8 3.5 - 5.0 mmol/L Final   04/22/2021 4.5 3.5 - 5.0 mmol/L Final   04/21/2021 4.9 3.5 - 5.0 mmol/L Final     Chloride   Date Value Ref Range Status   04/24/2021 110 (H) 98 - 107 mmol/L Final   04/23/2021 109 (H) 98 - 107 mmol/L Final   04/22/2021 106 98 - 107 mmol/L Final     CO2   Date Value Ref Range Status   04/24/2021 25 22 - 29 mmol/L Final   04/23/2021 24 22 - 29 mmol/L Final   04/22/2021 25 22 - 29 mmol/L Final     BUN   Date Value Ref Range Status   04/24/2021 39 (H) 6 - 23 mg/dL Final   04/23/2021 36 (H) 6 - 23 mg/dL Final   04/22/2021 32 (H) 6 - 23 mg/dL Final     CREATININE   Date Value Ref Range Status   04/24/2021 1.6 (H) 0.5 - 1.0 mg/dL Final   04/23/2021 1.7 (H) 0.5 - 1.0 mg/dL Final   04/22/2021 1.5 (H) 0.5 - 1.0 mg/dL Final     Glucose   Date Value Ref Range Status   04/24/2021 62 (L) 74 - 99 mg/dL Final   04/23/2021 72 (L) 74 - 99 mg/dL Final   04/22/2021 165 (H) 74 - 99 mg/dL Final     Calcium   Date Value Ref Range Status   04/24/2021 8.6 8.6 - 10.2 mg/dL Final   04/23/2021 8.8 8.6 - 10.2 mg/dL Final   04/22/2021 9.4 8.6 - 10.2 mg/dL Final     Total Protein   Date Value Ref Range Status   04/24/2021 5.1 (L) 6.4 - 8.3 g/dL Final   04/23/2021 5.1 (L) 6.4 - 8.3 g/dL Final   04/22/2021 6.2 (L) 6.4 - 8.3 g/dL Final     Albumin   Date Value Ref Range Status   04/24/2021 2.3 (L) 3.5 - 5.2 g/dL Final   04/23/2021 2.4 (L) 3.5 - 5.2 g/dL Final   04/22/2021 2.8 (L) 3.5 - 5.2 g/dL Final     Total Bilirubin   Date Value Ref Range Status   04/24/2021 0.2 0.0 - 1.2 mg/dL Final   04/23/2021 0.3 0.0 - 1.2 mg/dL Final   04/22/2021 0.4 0.0 - 1.2 mg/dL Final     Alkaline Phosphatase   Date Value Ref Range Status   04/24/2021 413 (H) 35 - 104 U/L Final   04/23/2021 493 (H) 35 - 104 U/L Final   04/22/2021 520 (H) 35 - 104 U/L Final     AST   Date Value Ref Range Status   04/24/2021 66 (H) 0 - 31 U/L Final     Comment:     Specimen is slightly Hemolyzed. Result may be artificially increased. 04/23/2021 74 (H) 0 - 31 U/L Final     Comment:     Specimen is slightly Hemolyzed. Result may be artificially increased. 04/22/2021 57 (H) 0 - 31 U/L Final     Comment:     Specimen is slightly Hemolyzed. Result may be artificially increased. ALT   Date Value Ref Range Status   04/24/2021 36 (H) 0 - 32 U/L Final   04/23/2021 43 (H) 0 - 32 U/L Final   04/22/2021 40 (H) 0 - 32 U/L Final     GFR Non-   Date Value Ref Range Status   04/24/2021 32 >=60 mL/min/1.73 Final     Comment:     Chronic Kidney Disease: less than 60 ml/min/1.73 sq.m. Kidney Failure: less than 15 ml/min/1.73 sq.m. Results valid for patients 18 years and older. 04/23/2021 30 >=60 mL/min/1.73 Final     Comment:     Chronic Kidney Disease: less than 60 ml/min/1.73 sq.m. Kidney Failure: less than 15 ml/min/1.73 sq.m. Results valid for patients 18 years and older. 04/22/2021 34 >=60 mL/min/1.73 Final     Comment:     Chronic Kidney Disease: less than 60 ml/min/1.73 sq.m. Kidney Failure: less than 15 ml/min/1.73 sq.m. Results valid for patients 18 years and older. GFR    Date Value Ref Range Status   04/24/2021 38  Final   04/23/2021 36  Final   04/22/2021 41  Final     Magnesium   Date Value Ref Range Status   04/24/2021 2.1 1.6 - 2.6 mg/dL Final   04/23/2021 2.0 1.6 - 2.6 mg/dL Final   04/22/2021 2.0 1.6 - 2.6 mg/dL Final     Phosphorus   Date Value Ref Range Status   04/15/2021 1.7 (L) 2.5 - 4.5 mg/dL Final   04/14/2021 2.5 2.5 - 4.5 mg/dL Final   04/13/2021 2.3 (L) 2.5 - 4.5 mg/dL Final     Recent Labs     04/23/21  1619   PH 7.415   PO2 65.9*   PCO2 39.5   HCO3 24.8   BE 0.2   O2SAT 92.1       RADIOLOGY:  XR CHEST PORTABLE   Final Result   Increasing bilateral infiltrates. XR CHEST PORTABLE   Final Result   Bilateral lower lobe airspace opacities. XR CHEST PORTABLE   Final Result   1. Multifocal bilateral airspace disease.   In comparison with the XR CHEST PORTABLE    (Results Pending)     PROBLEM LIST:  Principal Problem:    COVID-19  Active Problems:    HTN (hypertension)    Type 2 diabetes mellitus ([address]    Respiratory failure ([address]  Resolved Problems:    * No resolved hospital problems. *        ATTESTATION:  ICU Staff Physician note of personal involvement in Care  As the attending physician, I certify that I personally reviewed the patients history and personnally examined the patient to confirm the physical findings described above,  And that I reviewed the relevant imaging studies and available reports. I also discussed the differential diagnosis and all of the proposed management plans with the patient and individuals accompanying the patient to this visit. They had the opportunity to ask questions about the proposed management plans and to have those questions answered. This patient has a high probability of sudden, clinically significant deterioration, which requires the highest level of physician preparedness to intervene urgently. I managed/supervised life or organ supporting interventions that required frequent physician assessment. I devoted my full attention to the direct care of this patient for the amount of time indicated below. Time I spent with the family or surrogate(s) is included only if the patient was incapable of providing the necessary information or participating in medical decisions - Time devoted to teaching and to any procedures I billed separately is not included.      CRITICAL CARE TIME:  30 minutes    Checo Herrera MD  Pulmonary and Critical Care Medicine

## 2021-04-25 NOTE — PLAN OF CARE
Problem: Non-Violent Restraints  Goal: No harm/injury to patient while restraints in use  4/25/2021 0651 by Romana Solomons, RN  Outcome: Met This Shift     Problem: Non-Violent Restraints  Goal: Patient's dignity will be maintained  4/25/2021 0651 by Romana Solomons, RN  Outcome: Met This Shift     Problem: Non-Violent Restraints  Goal: Removal from restraints as soon as assessed to be safe  4/25/2021 0651 by Romana Solomons, RN  Outcome: Not Met This Shift

## 2021-04-25 NOTE — PLAN OF CARE
Problem: Isolation Precautions - Risk of Spread of Infection  Goal: Prevent transmission of infection  Outcome: Met This Shift     Problem: Nutrition Deficits  Goal: Optimize nutritional status  Outcome: Met This Shift     Problem: Falls - Risk of:  Goal: Will remain free from falls  Description: Will remain free from falls  Outcome: Met This Shift  Goal: Absence of physical injury  Description: Absence of physical injury  Outcome: Met This Shift     Problem: Non-Violent Restraints  Goal: No harm/injury to patient while restraints in use  4/25/2021 1032 by Galo Brown RN  Outcome: Met This Shift  4/25/2021 0651 by Natalia De Leon RN  Outcome: Met This Shift  4/25/2021 0118 by Natalia De Leon RN  Outcome: Met This Shift  Goal: Patient's dignity will be maintained  4/25/2021 1032 by Galo Brown RN  Outcome: Met This Shift  4/25/2021 0651 by Natalia De Leon RN  Outcome: Met This Shift  4/25/2021 0118 by Natalia De Leon RN  Outcome: Met This Shift     Problem: Breathing Pattern - Ineffective  Goal: Ability to achieve and maintain a regular respiratory rate  Outcome: Not Met This Shift     Problem: Non-Violent Restraints  Goal: Removal from restraints as soon as assessed to be safe  4/25/2021 1032 by Galo Brown RN  Outcome: Not Met This Shift  4/25/2021 0651 by Natalia De Leon RN  Outcome: Not Met This Shift  4/25/2021 0118 by Natalia De Leon RN  Outcome: Not Met This Shift

## 2021-04-25 NOTE — PROGRESS NOTES
Department of Internal Medicine  General Internal Medicine  Attending Progress Note      SUBJECTIVE:    Seen and examined in the ICU  Sedated on the vent  On propofol iv drip, no pressors  In prone  position this morning. Not Tolerating tube feeds.   Good urine output    Medications    Current Facility-Administered Medications: insulin glargine (LANTUS) injection vial 5 Units, 5 Units, Subcutaneous, Nightly  dexmedetomidine (PRECEDEX) 400 mcg in sodium chloride 0.9 % 100 mL infusion, 0.2-1.4 mcg/kg/hr, Intravenous, Continuous  buPROPion (WELLBUTRIN) tablet 100 mg, 100 mg, Per NG tube, BID  Gabapentin SOLN 100 mg, 100 mg, Enteral, Daily  levothyroxine (SYNTHROID) tablet 175 mcg, 175 mcg, Per NG tube, Daily  norepinephrine (LEVOPHED) 16 mg in dextrose 5 % 250 mL infusion, 2-100 mcg/min, Intravenous, Continuous  dexamethasone (PF) (DECADRON) injection 6 mg, 6 mg, Intravenous, Q24H  fentaNYL 5 mcg/ml in 0.9%  ml infusion, 12.5-200 mcg/hr, Intravenous, Continuous  propofol injection, 5-50 mcg/kg/min (Order-Specific), Intravenous, Titrated  lidocaine PF 1 % injection 5 mL, 5 mL, Intradermal, Once  sodium chloride flush 0.9 % injection 5-40 mL, 5-40 mL, Intravenous, 2 times per day  sodium chloride flush 0.9 % injection 5-40 mL, 5-40 mL, Intravenous, PRN  0.9 % sodium chloride infusion, 25 mL, Intravenous, PRN  heparin flush 100 UNIT/ML injection 300 Units, 3 mL, Intravenous, 2 times per day  heparin flush 100 UNIT/ML injection 300 Units, 3 mL, Intracatheter, PRN  pantoprazole (PROTONIX) injection 40 mg, 40 mg, Intravenous, Daily **AND** sodium chloride (PF) 0.9 % injection 10 mL, 10 mL, Intravenous, Daily  enoxaparin (LOVENOX) injection 90 mg, 1 mg/kg, Subcutaneous, Q12H  perflutren lipid microspheres (DEFINITY) injection 1.65 mg, 1.5 mL, Intravenous, ONCE PRN  ipratropium-albuterol (DUONEB) nebulizer solution 1 ampule, 1 ampule, Inhalation, Q4H  [Held by provider] fentaNYL 5 mcg/ml in 0.9%  ml infusion, 12.5-200 mcg/hr, Intravenous, Continuous  insulin lispro (HUMALOG) injection vial 0-12 Units, 0-12 Units, Subcutaneous, Q6H  hydrALAZINE (APRESOLINE) injection 10 mg, 10 mg, Intravenous, Q6H PRN  Vitamin D (CHOLECALCIFEROL) tablet 5,000 Units, 5,000 Units, Oral, Daily  polyvinyl alcohol (LIQUIFILM TEARS) 1.4 % ophthalmic solution 2 drop, 2 drop, Both Eyes, PRN  [Held by provider] lactated ringers infusion, , Intravenous, Continuous  glucose (GLUTOSE) 40 % oral gel 15 g, 15 g, Oral, PRN  dextrose 50 % IV solution, 12.5 g, Intravenous, PRN  glucagon (rDNA) injection 1 mg, 1 mg, Intramuscular, PRN  dextrose 5 % solution, 100 mL/hr, Intravenous, PRN  acetaminophen (TYLENOL) tablet 650 mg, 650 mg, Oral, Q6H PRN **OR** acetaminophen (TYLENOL) suppository 650 mg, 650 mg, Rectal, Q6H PRN  0.9 % sodium chloride bolus, 30 mL, Intravenous, PRN  zinc sulfate (ZINCATE) capsule 50 mg, 50 mg, Oral, Daily  sodium chloride flush 0.9 % injection 5-40 mL, 5-40 mL, Intravenous, PRN  sodium chloride flush 0.9 % injection 5-40 mL, 5-40 mL, Intravenous, BID  dextrose 50 % IV solution, 12.5 g, Intravenous, PRN  trimethobenzamide (TIGAN) injection 200 mg, 200 mg, Intramuscular, Q6H PRN    Physical    VITALS:  BP (!) 88/51   Pulse 69   Temp 97.9 °F (36.6 °C) (Core)   Resp 30   Ht 5' 2\" (1.575 m)   Wt 224 lb 1.6 oz (101.7 kg)   SpO2 94%   BMI 40.99 kg/m²   TEMPERATURE:  Current - Temp: 97.9 °F (36.6 °C);  Max - Temp  Av.8 °F (36.6 °C)  Min: 96.1 °F (35.6 °C)  Max: 98.8 °F (37.1 °C)  RESPIRATIONS RANGE: Resp  Av.4  Min: 20  Max: 36  PULSE RANGE: Pulse  Av.5  Min: 60  Max: 111  BLOOD PRESSURE RANGE:  Systolic (62PJM), CQL:778 , Min:86 , TMC:000   ; Diastolic (16ZEB), HFB:95, Min:48, Max:86    PULSE OXIMETRY RANGE: SpO2  Av.7 %  Min: 89 %  Max: 99 %  24HR INTAKE/OUTPUT:      Intake/Output Summary (Last 24 hours) at 2021 1546  Last data filed at 2021 1317  Gross per 24 hour   Intake 2320 ml   Output 2000 ml Net 320 ml       CONSTITUTIONAL: Sedated in prone position  HEENT: Normocephalic atraumatic. NECK: Supple, no lymphadenopathy,  no thyromegaly  LUNGS: diminished over the bases bilaterally. CARDIOVASCULAR: Regular rate and rhythm, no  rub or gallop. ABDOMEN: Soft,  bowel sounds are positive, no organomegaly appreciated. NEUROLOGIC: Sedated unable to fully assess. EXT: No  clubbing, or cyanosis. Trace to +1 edema b/l lower extremities. CBC with Differential:    Lab Results   Component Value Date    WBC 13.9 04/25/2021    RBC 2.38 04/25/2021    HGB 7.3 04/25/2021    HCT 24.0 04/25/2021     04/25/2021    .8 04/25/2021    MCH 30.7 04/25/2021    MCHC 30.4 04/25/2021    RDW 17.2 04/25/2021    NRBC 2.0 04/24/2021    METASPCT 0.9 04/25/2021    LYMPHOPCT 8.8 04/25/2021    MONOPCT 3.5 04/25/2021    MYELOPCT 3.5 04/25/2021    BASOPCT 0.9 04/25/2021    MONOSABS 0.56 04/25/2021    LYMPHSABS 1.25 04/25/2021    EOSABS 0.13 04/25/2021    BASOSABS 0.13 04/25/2021     CMP:    Lab Results   Component Value Date     04/25/2021    K 4.3 04/25/2021    K 3.8 04/23/2021     04/25/2021    CO2 24 04/25/2021    BUN 37 04/25/2021    CREATININE 1.6 04/25/2021    GFRAA 38 04/25/2021    LABGLOM 32 04/25/2021    GLUCOSE 134 04/25/2021    PROT 5.4 04/25/2021    LABALBU 2.4 04/25/2021    CALCIUM 8.5 04/25/2021    BILITOT <0.2 04/25/2021    ALKPHOS 371 04/25/2021    AST 61 04/25/2021    ALT 32 04/25/2021       ASSESSMENT AND PLAN      1.COVID associated pneumonia, s/p Remdesivir, Tocilizumab, invermectin , convalescent plasma. With most likely secondary bacterial/Fungal pneumonia,Currently on Cefepime , Eraxis and Vancomycin iv. ID following closely  On therapeutic anticoagulation with lovenox,  White cell count 13.9.  2.Acute respiratory failure secondary to above with possible ARDS, on decadron iv. ICU team following closely. On iv sedation for now. Proning per ICU team orders,Requiring PEEP of 10 and FiO2 of 90 % to keep saturation more than 90 %  3,Sepsis secondary to above off  iv pressors. 4.DM type 2, insulin requiring, on Lantus bid and sliding scale insulin  5. Hypothyroidism levothyroxine 175 mcg daily. 6.Tube feeds on hold for now, on iv fluids. 7.Acute renal injury, creatinine up to 1.6, good urine output so far. Monitor creatinine closely. Prognosis is very poor.     Bipin Cheema MD.  3:46 PM  4/25/2021

## 2021-04-25 NOTE — PROGRESS NOTES
CRITICAL CARE PROGRESS NOTE    The patient's case was discussed in multidisciplinary rounds including critical care specialist, nursing, RT and pharmacy. Her evaluation is as follows:     --On 100% FiO2 and PEEP 10, prone position   --Moves her head when mouth care is provided  --Start diuresis with 20 mg/day today, check BMP this afternoon, if creatinine same, will increase furosemide dose to BID    1) COVID-19 pneumonia with acute hypoxic respiratory failure,  ARDS and LEIGHTON  --Continue mechanical ventilation with lung protective strategy  --Stable LEIGHTON  --Cultures reviewed, no growth --> Continue vancomycin, cefepime and anidulafungin  --Management for COVID ARDS with dexamethasone  --Worsening infiltrates    Diabetes mellitus   --Decreased lantus, will stop D5W today, on tube feedings    Macrocytic anemia  --Monitor, transfusion if Hb <7    Delirium  --Continue propofol and precedex. Check triglycerides MWF  --Continue wellbutrin and neurontin    Hypothyroidism   --Continue levothyroxine     Morbid obesity with obesity hypoventilation syndrome  --Supportive care     Hypertension  --Medications on hold due to hypotension     BP (!) 88/56   Pulse 73   Temp 98.1 °F (36.7 °C) (Core)   Resp (!) 32   Ht 5' 2\" (1.575 m)   Wt 224 lb 1.6 oz (101.7 kg)   SpO2 92%   BMI 40.99 kg/m²     General: Comatose, prone positiion, lifted her head when I called her name  HEENT: Face with scabs, mouth with ETT, nose with scabs, no cervical adenopathy palpated  Respiratory: Lungs with diminished breath sounds bilaterally, no adventitious sounds auscultated, no accessory muscle use  CV: Regular rate, no murmurs, no JVD, no leg edema  Abdomen: Soft, non tender, + bowel sounds  Skin: Hydrated, adequate turgor, no rash, capillary refill <2 seconds  Extremities: Moves 4 limbs spontaneously, distal pulses present  Neurology: Lethargic, neck is supple, no meningitic signs present.       Last 3 CMP:    Recent Labs     04/23/21  0593 propofol 40 mcg/kg/min (04/25/21 1041)    sodium chloride      [Held by provider] fentaNYL 5 mcg/ml in 0.9%  ml infusion 125 mcg/hr (04/19/21 0625)    [Held by provider] lactated ringers 50 mL/hr at 04/21/21 0213    dextrose       sodium chloride flush, sodium chloride, heparin flush, perflutren lipid microspheres, hydrALAZINE, polyvinyl alcohol, glucose, dextrose, glucagon (rDNA), dextrose, acetaminophen **OR** acetaminophen, sodium chloride, sodium chloride flush, dextrose, trimethobenzamide    OBJECTIVE:  Vitals:    04/25/21 1100   BP:    Pulse: 73   Resp: (!) 32   Temp:    SpO2: 92%     FiO2 : 100 %     O2 Device: Ventilator        LABS:  WBC   Date Value Ref Range Status   04/25/2021 13.9 (H) 4.5 - 11.5 E9/L Final   04/24/2021 16.4 (H) 4.5 - 11.5 E9/L Final   04/24/2021 14.4 (H) 4.5 - 11.5 E9/L Final     Hemoglobin   Date Value Ref Range Status   04/25/2021 7.3 (L) 11.5 - 15.5 g/dL Final   04/24/2021 7.9 (L) 11.5 - 15.5 g/dL Final   04/24/2021 7.1 (L) 11.5 - 15.5 g/dL Final     Hematocrit   Date Value Ref Range Status   04/25/2021 24.0 (L) 34.0 - 48.0 % Final   04/24/2021 25.9 (L) 34.0 - 48.0 % Final   04/24/2021 23.1 (L) 34.0 - 48.0 % Final     MCV   Date Value Ref Range Status   04/25/2021 100.8 (H) 80.0 - 99.9 fL Final   04/24/2021 100.4 (H) 80.0 - 99.9 fL Final   04/24/2021 99.6 80.0 - 99.9 fL Final     Platelets   Date Value Ref Range Status   04/25/2021 321 130 - 450 E9/L Final   04/24/2021 354 130 - 450 E9/L Final   04/24/2021 329 130 - 450 E9/L Final     Sodium   Date Value Ref Range Status   04/25/2021 140 132 - 146 mmol/L Final   04/24/2021 146 132 - 146 mmol/L Final   04/23/2021 142 132 - 146 mmol/L Final     Potassium   Date Value Ref Range Status   04/25/2021 4.3 3.5 - 5.0 mmol/L Final   04/24/2021 4.1 3.5 - 5.0 mmol/L Final     Potassium reflex Magnesium   Date Value Ref Range Status   04/23/2021 3.8 3.5 - 5.0 mmol/L Final   04/22/2021 4.5 3.5 - 5.0 mmol/L Final   04/21/2021 4.9 3.5 - the stomach. XR CHEST PORTABLE   Final Result   No change in bilateral airspace opacities. May represent ongoing pneumonia   versus pulmonary edema. Enteric feeding tube terminates in the distal esophagus. Recommend   advancement. XR CHEST PORTABLE   Final Result   Mild cardiomegaly and moderate pulmonary edema which is more prominent with   increasing opacities throughout both lungs. Questionable small bibasilar pleural effusions which are unchanged. No change in the tubes and catheters. XR CHEST PORTABLE   Final Result   1. NG tube is present. Distal aspect of NG tube is not visualized. Short-term follow-up recommended. 2. Endotracheal tube is approximately 3 cm above the zaynab. 3. Stable airspace opacities throughout both lungs suggesting bilateral   pneumonia. XR CHEST PORTABLE   Final Result   Nasogastric tube terminates in the distal esophagus and could be further   advanced for more optimal positioning. Status post intubation. Extensive bilateral infiltrates are similar to previous. Continued follow-up   recommended. XR CHEST PORTABLE   Final Result   1. There is no interval change in extensive multifocal bilateral airspace   disease. XR CHEST PORTABLE   Final Result   Moderate bilateral pulmonary opacities favored to be due to multifocal   pneumonia unchanged since yesterday. US DUP LOWER EXTREMITIES BILATERAL VENOUS   Final Result   No evidence of DVT in either lower extremity given the limitations detailed   above. CTA CHEST W CONTRAST   Final Result   Extensive bilateral pulmonary infiltrates may reflect pneumonia or pulmonary   edema         XR CHEST PORTABLE   Final Result   Multifocal bilateral pneumonia.          XR CHEST PORTABLE    (Results Pending)     PROBLEM LIST:  Principal Problem:    COVID-19  Active Problems:    HTN (hypertension)    Type 2 diabetes mellitus (HCC)    Respiratory failure [facility])  Resolved Problems:    * No resolved hospital problems. *        ATTESTATION:  ICU Staff Physician note of personal involvement in Care  As the attending physician, I certify that I personally reviewed the patients history and personnally examined the patient to confirm the physical findings described above,  And that I reviewed the relevant imaging studies and available reports. I also discussed the differential diagnosis and all of the proposed management plans with the patient and individuals accompanying the patient to this visit. They had the opportunity to ask questions about the proposed management plans and to have those questions answered. This patient has a high probability of sudden, clinically significant deterioration, which requires the highest level of physician preparedness to intervene urgently. I managed/supervised life or organ supporting interventions that required frequent physician assessment. I devoted my full attention to the direct care of this patient for the amount of time indicated below. Time I spent with the family or surrogate(s) is included only if the patient was incapable of providing the necessary information or participating in medical decisions - Time devoted to teaching and to any procedures I billed separately is not included.      CRITICAL CARE TIME:  30 minutes    Sneha Machado MD  Pulmonary and Critical Care Medicine

## 2021-04-25 NOTE — PROGRESS NOTES
43* 36*  --  32   TRIG  --   --  241*  --      Lab Results   Component Value Date    TRIG 241 04/24/2021     Lab Results   Component Value Date/Time    VITD25 11 (L) 04/13/2021 04:57 AM       Microbiology:     FINAL IMPRESSION    Leukocytosis/fevers  Intermittent   Follow cbc blood cx4/17 pending   sepsis  covid pneumonia severe with poss bacterial pneumonia   -s/p TOCI  -s/p remdesivir  -s/p ivermectin  S/p convalescent plasma x2  RLE cellulitis   Vitamin D deficiency  pennie     PLAN   dexamethasone (PF) (DECADRON) injection 6 mg, Q8H  vancomycin (VANCOCIN) intermittent dosing (placeholder), RX Placeholder  cefepime (MAXIPIME) 2000 mg IVPB extended (mini-bag), Q12H  anidulafungin (ERAXIS) 100 mg in dextrose 5 % 130 mL IVPB, Q24H     · Monitor labs  · cx ngtd  Will stop atbx [address] [facility], [facility] - NP  4/25/2021  12:07 PM      Patient examined along with the nurse practitioner  No significant change in the patient's status  Agree with above    I have discussed the case, including pertinent history . I agree with the assessment, plan and orders as documented.       Treatment plan as per my recommendation     Jus Espinal MD, FACP  4/25/2021  8:25 PM

## 2021-04-25 NOTE — PROGRESS NOTES
Pharmacy Consultation Note  (Antibiotic Dosing and Monitoring)    Initial consult date: 4/13  Consulting physician: Dr Henrique Angulo  Drug(s): Vancomycin IV  Indication: Cellulitis      Antibiotics stopped. Pharmacy will sign off on the vancomycin consult.       Thank you for the consult,    Nat Loyd PharmD 4/25/2021 12:29 PM   [phone]

## 2021-04-26 NOTE — PROGRESS NOTES
CRITICAL CARE PROGRESS NOTE    I called the patient's daughter, Ms June to update on the patient's critical condition, worsening hypoxemia, renal function and shock, I told her I worry she will pass soon. Also communicated the need for endotracheal tube exchange.      Catherine Garland MD  Pulmonary and Critical Care Medicine

## 2021-04-26 NOTE — PROGRESS NOTES
DEATH NOTE    Patient's name: Lilliam Au  Date and time of death: April 26, 2021 at 18:15  Cause of death: COVID-19 pneumonia and ARDS, septic shock, acute kidney injury  Attending physician notified: yes  Code status:DNR-Comfort Care    The patient is immobile, flaccid, with no respiratory effort, cardiac sounds absent.     EKG strip with ventricular rhythm, patient has a pacemaker    Sneha Machado MD  Pulmonary and Critical Care Medicine

## 2021-04-26 NOTE — PROGRESS NOTES
CRITICAL CARE PROGRESS NOTE    I called Mrs Tammy Mckeon son, Jomar Maloney, to discuss the current clinical status of the patient, worsening ARDS, worsening hypoxemia and now requiring PEEP 16, in shock, worsening renal failure and we are unable to insert a dialysis line as she does not tolerate supine position. It is likely that she will die in the next several hours to few days. I asked him what would she say if she could magically see herself and participate in this conversation; he said \"she wouldn't want this, she wouldn't want to be kept alive like this\". I recommended against chest compressions, he agrees. I entered an order for Limited code.     Art Coe MD  Pulmonary and Critical Care Medicine

## 2021-04-26 NOTE — CARE COORDINATION
4/26/21 Positivie covid 4/13/21- Covid- ARDS. CM transition of care: pt continues icu/isolation/sedation/vent. Vent at 100% intubated on 4/16/21 noted. Previously on paralytics- off and proning previously. Potential for ltach - [address]  would need to work with CM for placement if pt would be stable for that loc. CM/SS to follow.  Electronically signed by Deondre Roman RN-BC on 4/26/2021 at 10:42 AM

## 2021-04-26 NOTE — PROGRESS NOTES
Spoke to [facility] [employer] regarding a family member's request for leave due to patient death. Provided confirmation that patient has . They request I take down a referral number and phone number for them.  Referral Number: [id number] Phone Number: [place]

## 2021-04-26 NOTE — PROGRESS NOTES
CRITICAL CARE PROGRESS NOTE    We met with the patient's son and daughter, updated them on her clinical condition. They were allowed inside the room to visit, they request removal of life support. I changed her code status to [address] Nicole Saenz MD  Pulmonary and Critical Care Medicine

## 2021-04-26 NOTE — PROGRESS NOTES
[facility]  [facility]  Progress Note    NAME: Charlotte Evans  MR:  [id number]  :   1950  DATE OF SERVICE:21    This is a face to face encounter with Charlotte Evans 69 y.o. female on 21    CHIEF COMPLAINT     ID following for   Chief Complaint   Patient presents with    Respiratory Distress     RECENT HX OF COVID     No examination today due to the efforts to preserve PPE and limit transmission/exposure to COVID-19. All relevant records and diagnostic tests were reviewed, including laboratory results and imaging. Please reference any relevant documentation elsewhere. Care will be coordinated with patient's care team-icu nurse Dr Josh Perla today     HISTORYOF PRESENT ILLNESS   Hypothermia  Respiratory failure ards  Inc fio2        REVIEW OF SYSTEMS     As stated above in the chief complaint, otherwise negative.   CURRENT MEDICATIONS     Scheduled Meds:   midodrine  10 mg Per NG tube Q8H    linezolid  600 mg Intravenous Q12H    [START ON 2021] anidulafungin  100 mg Intravenous Q24H    meropenem  1,000 mg Intravenous Q12H    acyclovir  5 mg/kg Intravenous Q12H    succinylcholine  140 mg Intravenous Once    insulin glargine  5 Units Subcutaneous Nightly    Gabapentin  100 mg Enteral Daily    levothyroxine  175 mcg Per NG tube Daily    dexamethasone  6 mg Intravenous Q24H    lidocaine PF  5 mL Intradermal Once    sodium chloride flush  5-40 mL Intravenous 2 times per day    heparin flush  3 mL Intravenous 2 times per day    pantoprazole  40 mg Intravenous Daily    And    sodium chloride (PF)  10 mL Intravenous Daily    enoxaparin  1 mg/kg Subcutaneous Q12H    ipratropium-albuterol  1 ampule Inhalation Q4H    insulin lispro  0-12 Units Subcutaneous Q6H    Vitamin D  5,000 Units Oral Daily    zinc sulfate  50 mg Oral Daily    sodium chloride flush  5-40 mL Intravenous BID     Continuous Infusions:   ketamine (KETALAR) infusion for analgosedation 2 Aminah Quarles MD on 4/26/2021 at 2:13 PM

## 2021-04-26 NOTE — PLAN OF CARE
Problem: Non-Violent Restraints  Goal: Removal from restraints as soon as assessed to be safe  4/26/2021 1837 by Susi Castorena RN  Outcome: Met This Shift     Problem: Non-Violent Restraints  Goal: No harm/injury to patient while restraints in use  4/26/2021 1837 by Susi Castorena RN  Outcome: Met This Shift     Problem: Non-Violent Restraints  Goal: Patient's dignity will be maintained  4/26/2021 1837 by Susi Castorena RN  Outcome: Met This Shift

## 2021-04-26 NOTE — PLAN OF CARE
Problem: Non-Violent Restraints  Goal: No harm/injury to patient while restraints in use  4/25/2021 2200 by Marie Santacruz RN  Outcome: Met This Shift     Problem: Non-Violent Restraints  Goal: Patient's dignity will be maintained  4/25/2021 2200 by Marie Santacruz RN  Outcome: Met This Shift     Problem: Non-Violent Restraints  Goal: Removal from restraints as soon as assessed to be safe  4/25/2021 2200 by Marie Santacruz RN  Outcome: Not Met This Shift

## 2021-04-26 NOTE — PROGRESS NOTES
sounds bilaterally, no adventitious sounds auscultated, no accessory muscle use  CV: Regular rate, no murmurs, no JVD, 1+ leg edema  Abdomen: Soft, non tender, + bowel sounds, no lesions  Skin: Hydrated, adequate turgor, no rash, capillary refill <2 seconds  Extremities: Flaccid extremities, distal pulses present  Neurology: Comatose, breaths above set rate, cough, gag and corneal reflexes present, neck is supple, no meningitic signs present.       Last 3 CMP:    Recent Labs     04/24/21  0632 04/25/21  0403 04/25/21  1627 04/26/21  0405    140 141 138   K 4.1 4.3 4.6 4.3   * 107 110* 105   CO2 25 24 23 20*   BUN 39* 37* 38* 39*   CREATININE 1.6* 1.6* 1.8* 2.0*   GLUCOSE 62* 134* 110* 133*   CALCIUM 8.6 8.5* 8.6 8.5*   PROT 5.1* 5.4*  --  5.3*   LABALBU 2.3* 2.4*  --  2.3*   BILITOT 0.2 <0.2  --  <0.2   ALKPHOS 413* 371*  --  307*   AST 66* 61*  --  47*   ALT 36* 32  --  28       Recent Labs     04/26/21  0405   WBC 13.7*   RBC 2.26*   HGB 7.0*   HCT 23.8*   .3*   MCH 31.0   MCHC 29.4*   RDW 17.4*      MPV 9.7       Recent Labs     04/23/21  1414   BC 24 Hours no growth       Recent Labs     04/23/21  1354   BLOODCULT2 24 Hours no growth       24 HR INTAKE/OUTPUT:      Intake/Output Summary (Last 24 hours) at 4/26/2021 1005  Last data filed at 4/26/2021 2101  Gross per 24 hour   Intake 2578 ml   Output 1275 ml   Net 1303 ml     MEDICATIONS:   insulin glargine  5 Units Subcutaneous Nightly    buPROPion  100 mg Per NG tube BID    Gabapentin  100 mg Enteral Daily    levothyroxine  175 mcg Per NG tube Daily    dexamethasone  6 mg Intravenous Q24H    lidocaine PF  5 mL Intradermal Once    sodium chloride flush  5-40 mL Intravenous 2 times per day    heparin flush  3 mL Intravenous 2 times per day    pantoprazole  40 mg Intravenous Daily    And    sodium chloride (PF)  10 mL Intravenous Daily    enoxaparin  1 mg/kg Subcutaneous Q12H    ipratropium-albuterol  1 ampule Inhalation Q4H    insulin lispro  0-12 Units Subcutaneous Q6H    Vitamin D  5,000 Units Oral Daily    zinc sulfate  50 mg Oral Daily    sodium chloride flush  5-40 mL Intravenous BID      ketamine (KETALAR) infusion for analgosedation      dexmedetomidine HCl in NaCl 1.4 mcg/kg/hr (04/26/21 0830)    norepinephrine 2 mcg/min (04/26/21 0122)    fentaNYL 5 mcg/ml in 0.9%  ml infusion 100 mcg/hr (04/25/21 2332)    sodium chloride      [Held by provider] fentaNYL 5 mcg/ml in 0.9%  ml infusion 125 mcg/hr (04/19/21 0625)    [Held by provider] lactated ringers 50 mL/hr at 04/21/21 0213    dextrose       sodium chloride flush, sodium chloride, heparin flush, perflutren lipid microspheres, hydrALAZINE, polyvinyl alcohol, glucose, dextrose, glucagon (rDNA), dextrose, acetaminophen **OR** acetaminophen, sodium chloride, sodium chloride flush, dextrose, trimethobenzamide    OBJECTIVE:  Vitals:    04/26/21 0900   BP: (!) 105/45   Pulse: 70   Resp: 25   Temp: 95.2 °F (35.1 °C)   SpO2: 95%     FiO2 : 100 %     O2 Device: Ventilator        LABS:  WBC   Date Value Ref Range Status   04/26/2021 13.7 (H) 4.5 - 11.5 E9/L Final   04/25/2021 13.9 (H) 4.5 - 11.5 E9/L Final   04/24/2021 16.4 (H) 4.5 - 11.5 E9/L Final     Hemoglobin   Date Value Ref Range Status   04/26/2021 7.0 (L) 11.5 - 15.5 g/dL Final   04/25/2021 7.3 (L) 11.5 - 15.5 g/dL Final   04/24/2021 7.9 (L) 11.5 - 15.5 g/dL Final     Hematocrit   Date Value Ref Range Status   04/26/2021 23.8 (L) 34.0 - 48.0 % Final   04/25/2021 24.0 (L) 34.0 - 48.0 % Final   04/24/2021 25.9 (L) 34.0 - 48.0 % Final     MCV   Date Value Ref Range Status   04/26/2021 105.3 (H) 80.0 - 99.9 fL Final   04/25/2021 100.8 (H) 80.0 - 99.9 fL Final   04/24/2021 100.4 (H) 80.0 - 99.9 fL Final     Platelets   Date Value Ref Range Status   04/26/2021 320 130 - 450 E9/L Final   04/25/2021 321 130 - 450 E9/L Final   04/24/2021 354 130 - 450 E9/L Final     Sodium   Date Value Ref Range Status   04/26/2021 138 132 - 146 mmol/L Final   04/25/2021 141 132 - 146 mmol/L Final   04/25/2021 140 132 - 146 mmol/L Final     Potassium   Date Value Ref Range Status   04/26/2021 4.3 3.5 - 5.0 mmol/L Final   04/25/2021 4.6 3.5 - 5.0 mmol/L Final   04/25/2021 4.3 3.5 - 5.0 mmol/L Final     Potassium reflex Magnesium   Date Value Ref Range Status   04/23/2021 3.8 3.5 - 5.0 mmol/L Final   04/22/2021 4.5 3.5 - 5.0 mmol/L Final   04/21/2021 4.9 3.5 - 5.0 mmol/L Final     Chloride   Date Value Ref Range Status   04/26/2021 105 98 - 107 mmol/L Final   04/25/2021 110 (H) 98 - 107 mmol/L Final   04/25/2021 107 98 - 107 mmol/L Final     CO2   Date Value Ref Range Status   04/26/2021 20 (L) 22 - 29 mmol/L Final   04/25/2021 23 22 - 29 mmol/L Final   04/25/2021 24 22 - 29 mmol/L Final     BUN   Date Value Ref Range Status   04/26/2021 39 (H) 6 - 23 mg/dL Final   04/25/2021 38 (H) 6 - 23 mg/dL Final   04/25/2021 37 (H) 6 - 23 mg/dL Final     CREATININE   Date Value Ref Range Status   04/26/2021 2.0 (H) 0.5 - 1.0 mg/dL Final   04/25/2021 1.8 (H) 0.5 - 1.0 mg/dL Final   04/25/2021 1.6 (H) 0.5 - 1.0 mg/dL Final     Glucose   Date Value Ref Range Status   04/26/2021 133 (H) 74 - 99 mg/dL Final   04/25/2021 110 (H) 74 - 99 mg/dL Final   04/25/2021 134 (H) 74 - 99 mg/dL Final     Calcium   Date Value Ref Range Status   04/26/2021 8.5 (L) 8.6 - 10.2 mg/dL Final   04/25/2021 8.6 8.6 - 10.2 mg/dL Final   04/25/2021 8.5 (L) 8.6 - 10.2 mg/dL Final     Total Protein   Date Value Ref Range Status   04/26/2021 5.3 (L) 6.4 - 8.3 g/dL Final   04/25/2021 5.4 (L) 6.4 - 8.3 g/dL Final   04/24/2021 5.1 (L) 6.4 - 8.3 g/dL Final     Albumin   Date Value Ref Range Status   04/26/2021 2.3 (L) 3.5 - 5.2 g/dL Final   04/25/2021 2.4 (L) 3.5 - 5.2 g/dL Final   04/24/2021 2.3 (L) 3.5 - 5.2 g/dL Final     Total Bilirubin   Date Value Ref Range Status   04/26/2021 <0.2 0.0 - 1.2 mg/dL Final   04/25/2021 <0.2 0.0 - 1.2 mg/dL Final   04/24/2021 0.2 0.0 - 1.2 mg/dL Final Result   Bilateral lower lobe airspace opacities. XR CHEST PORTABLE   Final Result   1. Multifocal bilateral airspace disease. In comparison with the prior study   of 04/18/2021 the airspace disease within the left upper lobe is more   extensive. .         XR ABDOMEN FOR NG/OG/NE TUBE PLACEMENT   Final Result   Nasogastric tube with the tip in the stomach. XR CHEST PORTABLE   Final Result   No change in bilateral airspace opacities. May represent ongoing pneumonia   versus pulmonary edema. Enteric feeding tube terminates in the distal esophagus. Recommend   advancement. XR CHEST PORTABLE   Final Result   Mild cardiomegaly and moderate pulmonary edema which is more prominent with   increasing opacities throughout both lungs. Questionable small bibasilar pleural effusions which are unchanged. No change in the tubes and catheters. XR CHEST PORTABLE   Final Result   1. NG tube is present. Distal aspect of NG tube is not visualized. Short-term follow-up recommended. 2. Endotracheal tube is approximately 3 cm above the zaynab. 3. Stable airspace opacities throughout both lungs suggesting bilateral   pneumonia. XR CHEST PORTABLE   Final Result   Nasogastric tube terminates in the distal esophagus and could be further   advanced for more optimal positioning. Status post intubation. Extensive bilateral infiltrates are similar to previous. Continued follow-up   recommended. XR CHEST PORTABLE   Final Result   1. There is no interval change in extensive multifocal bilateral airspace   disease. XR CHEST PORTABLE   Final Result   Moderate bilateral pulmonary opacities favored to be due to multifocal   pneumonia unchanged since yesterday. US DUP LOWER EXTREMITIES BILATERAL VENOUS   Final Result   No evidence of DVT in either lower extremity given the limitations detailed   above.          CTA CHEST W CONTRAST   Final Result Extensive bilateral pulmonary infiltrates may reflect pneumonia or pulmonary   edema         XR CHEST PORTABLE   Final Result   Multifocal bilateral pneumonia. XR CHEST PORTABLE    (Results Pending)     PROBLEM LIST:  Principal Problem:    COVID-19  Active Problems:    HTN (hypertension)    Type 2 diabetes mellitus ([address]    Respiratory failure ([address]  Resolved Problems:    * No resolved hospital problems. *        ATTESTATION:  ICU Staff Physician note of personal involvement in Care  As the attending physician, I certify that I personally reviewed the patients history and personnally examined the patient to confirm the physical findings described above,  And that I reviewed the relevant imaging studies and available reports. I also discussed the differential diagnosis and all of the proposed management plans with the patient and individuals accompanying the patient to this visit. They had the opportunity to ask questions about the proposed management plans and to have those questions answered. This patient has a high probability of sudden, clinically significant deterioration, which requires the highest level of physician preparedness to intervene urgently. I managed/supervised life or organ supporting interventions that required frequent physician assessment. I devoted my full attention to the direct care of this patient for the amount of time indicated below. Time I spent with the family or surrogate(s) is included only if the patient was incapable of providing the necessary information or participating in medical decisions - Time devoted to teaching and to any procedures I billed separately is not included.      CRITICAL CARE TIME:  30 minutes    Jostin May MD  Pulmonary and Critical Care Medicine

## 2021-04-26 NOTE — DISCHARGE SUMMARY
Physician Discharge Summary     Patient ID:  Laurice Angelucci  [id number]  29 y.o.  1950    Admit date: 2021    Discharge date and time:  21    Admission Diagnoses: Principal Problem:    COVID-19  Active Problems:    HTN (hypertension)    Type 2 diabetes mellitus ([facility]    Respiratory failure ([facility] [facility]  Resolved Problems:    * No resolved hospital problems.  *      Discharge Diagnoses: same    Consults: icu team    Procedures: intubated    Hospital Course: pt came with covid 19/covid pneumonia sepsis,with respiratory failure/ards/renal failure,was intubated ,vent was adjusted accordingly, had antibiotics/pressors/fluids,however pt could not survive the disease ,case was discussed with family they switch her to [facility] and pt  at 18:15    Condition on discharge: passed away                                                                         Note that over 30 minutes was spent in preparing discharge papers, discussing discharge with patient, medication review, etc.      Electronically signed by Obdulia Porter MD on 2021 at 6:39 PM

## 2021-04-28 LAB
BLOOD CULTURE, ROUTINE: NORMAL
CULTURE, BLOOD 2: NORMAL

## 2022-02-16 NOTE — PLAN OF CARE
Problem: Non-Violent Restraints  Goal: No harm/injury to patient while restraints in use  4/26/2021 0626 by Shayy La RN  Outcome: Met This Shift     Problem: Non-Violent Restraints  Goal: Patient's dignity will be maintained  4/26/2021 0626 by Shayy La RN  Outcome: Met This Shift     Problem: Non-Violent Restraints  Goal: Removal from restraints as soon as assessed to be safe  4/26/2021 0626 by Shayy La RN  Outcome: Not Met This Shift Chief Complaint:  38yo F with ESRD with chief complaint of needs dialysis catheter    HPI:  Pt is a pleasant 40 yo female w/PMH of ESRD (M/W/F), GERD, bacteremia, seizures, epilepsy, depression, COPD, HTN, heroin abuse, RA, Cdiff colitis in Dec 2021 smoker, recent admission for Aspiration Pna who  presents to [facility] via ambulance from dialysis center for brief loss of consciousness, questionable seizure vs syncope. Pt found to have bacteremia, now s/p tunneled dialysis catheter removal and shiley placement. Cultures have remained clear so far, IR consulted for placement of new tunneled dialysis catheter if tomorrows cultures remain negative.     Allergies  morphine (Rash)    MEDICATIONS  (STANDING):  apixaban 2.5 milliGRAM(s) Oral two times a day  carboxymethylcellulose 0.5% Ophthalmic Solution 1 Drop(s) Both EYES two times a day  chlorhexidine 4% Liquid 1 Application(s) Topical <User Schedule>  collagenase Ointment 1 Application(s) Topical daily  epoetin amee-epbx (RETACRIT) Injectable 42967 Unit(s) IV Push <User Schedule>  gabapentin 100 milliGRAM(s) Oral three times a day  hydroxychloroquine 200 milliGRAM(s) Oral daily  levETIRAcetam 250 milliGRAM(s) Oral <User Schedule>  levETIRAcetam 500 milliGRAM(s) Oral daily  metoprolol tartrate 12.5 milliGRAM(s) Oral two times a day  pantoprazole    Tablet 40 milliGRAM(s) Oral before breakfast  sevelamer carbonate 800 milliGRAM(s) Oral three times a day with meals  vancomycin  IVPB 500 milliGRAM(s) IV Intermittent <User Schedule>    MEDICATIONS  (PRN):  acetaminophen     Tablet .. 650 milliGRAM(s) Oral every 6 hours PRN Temp greater or equal to 38.5C (101.3F), Mild Pain (1 - 3)  acetaminophen   IVPB .. 1000 milliGRAM(s) IV Intermittent once PRN Temp greater or equal to 38.5C (101.3F)  albumin human 25% IVPB 50 milliLiter(s) IV Intermittent every 1 hour PRN SBP less than 100  ALBUTerol    90 MICROgram(s) HFA Inhaler 2 Puff(s) Inhalation every 6 hours PRN Shortness of Breath and/or Wheezing  diphenhydrAMINE 25 milliGRAM(s) Oral every 6 hours PRN Rash and/or Itching  loperamide 2 milliGRAM(s) Oral every 6 hours PRN Diarrhea  melatonin 3 milliGRAM(s) Oral at bedtime PRN Insomnia  oxycodone    5 mG/acetaminophen 325 mG 1 Tablet(s) Oral every 8 hours PRN Severe Pain (7 - 10)  senna 2 Tablet(s) Oral at bedtime PRN Constipation  sodium chloride 0.9% lock flush 10 milliLiter(s) IV Push every 1 hour PRN Pre/post blood products, medications, blood draw, and to maintain line patency      PAST MEDICAL & SURGICAL HISTORY:  Rheumatoid arthritis    H/O Raynaud&#x27;s syndrome    Heroin abuse    Smoker    Sepsis    HTN (hypertension)    COPD (chronic obstructive pulmonary disease)    Depression    Epilepsy    GERD (gastroesophageal reflux disease)    Bacteremia    Systemic lupus erythematosus    Aphonia    H/O tubal ligation    H/O appendicitis    Gall bladder stones    H/O tracheostomy    S/P percutaneous endoscopic gastrostomy (PEG) tube placement        FAMILY HISTORY:  Family history of cardiomyopathy (Mother)      Vital Signs Last 24 Hrs  T(C): 37.2 (16 Feb 2022 08:53), Max: 37.2 (16 Feb 2022 08:53)  T(F): 98.9 (16 Feb 2022 08:53), Max: 98.9 (16 Feb 2022 08:53)  HR: 105 (16 Feb 2022 08:53) (98 - 105)  BP: 142/88 (16 Feb 2022 08:53) (125/87 - 142/88)  BP(mean): --  RR: 18 (16 Feb 2022 08:53) (18 - 18)  SpO2: 100% (16 Feb 2022 08:53) (97% - 100%)      CBC                        8.5    8.35  )-----------( 256      ( 16 Feb 2022 07:45 )             26.3       Chemistry  02-15    131<L>  |  97  |  28<H>  ----------------------------<  91  3.0<L>   |  23  |  5.21<H>    Ca    7.8<L>      15 Feb 2022 10:52  Phos  4.0     02-15    TPro  x   /  Alb  1.6<L>  /  TBili  x   /  DBili  x   /  AST  x   /  ALT  x   /  AlkPhos  x   02-15

## 2023-02-17 NOTE — ED PROVIDER NOTES
Chief complaint: Shortness of breath      HPI:  4/12/21, Time: 8:45 AM EDT    HPI             Andrew Merida is a 69 y.o. female presenting to the ED for shortness of breath. The history is obtained from patient as well as the patient's medical record. Patient is presenting today for shortness of breath. This has been present for approximately 10 days. The patient was diagnosed Covid positive on 4/1/2021. Shortness of breath is moderate in severity. Worse with activity and exertion. No alleviating factors. Has been constant since onset. The patient was noted to be hypoxic at 70% on EMS arrival at home. The patient does not require any supplemental oxygen. The patient denies any fevers, chills, chest pain, nausea, vomiting, abdominal pain, dysuria, diarrhea or constipation. The patient does have a history hypertension and diabetes. Patient does not have hyperlipidemia or coronary artery disease. She is not a smoker. The patient has no history of DVT or PE, is not on any hormone replacement therapy, denies any active malignancy, recent surgeries or long periods of travel sitting in a car or plane. ROS:   Review of Systems   Constitutional: Negative for chills and fatigue. HENT: Negative for congestion. Eyes: Negative for redness. Respiratory: Positive for shortness of breath. Cardiovascular: Negative for chest pain. Gastrointestinal: Negative for abdominal pain, nausea and vomiting. Genitourinary: Negative for dysuria. Musculoskeletal: Negative for arthralgias. Skin: Negative for rash. Neurological: Negative for light-headedness. Psychiatric/Behavioral: Negative for confusion. All other systems reviewed and are negative.      --------------------------------------------- PAST HISTORY ---------------------------------------------  Past Medical History:  has a past medical history of Diabetes mellitus ([facility] [address] Hypertension, Irregular heart beat, and Thyroid disease.     Past Surgical History:  has a past surgical history that includes Hysterectomy. Social History:  reports that she has never smoked. She has never used smokeless tobacco. She reports previous alcohol use. She reports that she does not use drugs. Family History: family history is not on file. The patients home medications have been reviewed. Allergies: Patient has no known allergies. ---------------------------------------------------PHYSICAL EXAM--------------------------------------    Constitutional/General: Alert and oriented x3, well appearing, non toxic in NAD  Head: Normocephalic and atraumatic  Mouth: Oropharynx clear, handling secretions, no trismus  Neck: Supple, full ROM,  Pulmonary: Moderate respiratory distress, diffusely diminished breath sounds, mild expiratory wheezing present, no rales or rhonchi  Cardiovascular:  Regular rate. Regular rhythm. No murmurs  Chest: no chest wall tenderness  Abdomen: Soft. Non tender. Non distended. No rebound, guarding, or rigidity. No pulsatile masses appreciated. Musculoskeletal: Moves all extremities x 4. Warm and well perfused, no clubbing, cyanosis, or edema. There is moderate erythema and warmth of the right lower extremity capillary refill <3 seconds  Skin: warm and dry. No rashes. Neurologic: GCS 15, no gross focal neurologic deficits  Psych: Normal Affect    -------------------------------------------------- RESULTS -------------------------------------------------  I have personally reviewed all laboratory and imaging results for this patient. Results are listed below.      LABS:  Results for orders placed or performed during the hospital encounter of 04/12/21   SARS-CoV-2 NAAT (Rapid)    Specimen: Nasopharyngeal Swab   Result Value Ref Range    SARS-CoV-2, NAAT DETECTED (A) Not Detected   CBC Auto Differential   Result Value Ref Range    WBC 17.0 (H) 4.5 - 11.5 E9/L    RBC 3.14 (L) 3.50 - 5.50 E12/L    Hemoglobin 9.5 (L) 11.5 - 15.5 g/dL Hematocrit 30.8 (L) 34.0 - 48.0 %    MCV 98.1 80.0 - 99.9 fL    MCH 30.3 26.0 - 35.0 pg    MCHC 30.8 (L) 32.0 - 34.5 %    RDW 14.9 11.5 - 15.0 fL    Platelets 660 (H) 286 - 450 E9/L    MPV 8.8 7.0 - 12.0 fL    Neutrophils % 89.4 (H) 43.0 - 80.0 %    Lymphocytes % 4.4 (L) 20.0 - 42.0 %    Monocytes % 6.2 2.0 - 12.0 %    Eosinophils % 0.0 0.0 - 6.0 %    Basophils % 0.4 0.0 - 2.0 %    Neutrophils Absolute 15.13 (H) 1.80 - 7.30 E9/L    Lymphocytes Absolute 0.68 (L) 1.50 - 4.00 E9/L    Monocytes Absolute 1.02 (H) 0.10 - 0.95 E9/L    Eosinophils Absolute 0.00 (L) 0.05 - 0.50 E9/L    Basophils Absolute 0.00 0.00 - 0.20 E9/L    Polychromasia 1+     Poikilocytes 1+     Ovalocytes 1+    Comprehensive Metabolic Panel w/ Reflex to MG   Result Value Ref Range    Sodium 137 132 - 146 mmol/L    Potassium reflex Magnesium 4.1 3.5 - 5.0 mmol/L    Chloride 101 98 - 107 mmol/L    CO2 16 (L) 22 - 29 mmol/L    Anion Gap 20 (H) 7 - 16 mmol/L    Glucose 339 (H) 74 - 99 mg/dL    BUN 36 (H) 8 - 23 mg/dL    CREATININE 1.5 (H) 0.5 - 1.0 mg/dL    GFR Non-African American 34 >=60 mL/min/1.73    GFR African American 41     Calcium 8.9 8.6 - 10.2 mg/dL    Total Protein 7.9 6.4 - 8.3 g/dL    Albumin 3.1 (L) 3.5 - 5.2 g/dL    Total Bilirubin 0.3 0.0 - 1.2 mg/dL    Alkaline Phosphatase 102 35 - 104 U/L    ALT 16 0 - 32 U/L    AST 45 (H) 0 - 31 U/L   Procalcitonin   Result Value Ref Range    Procalcitonin 1.75 (H) 0.00 - 0.08 ng/mL   Arterial Blood Gas, Respiratory Only   Result Value Ref Range    Source: Arterial     FIO2 Arterial 100.0     pH, Blood Gas 7.396 7.350 - 7.450    pCO2, Arterial 24.7 (L) 35.0 - 45.0 mmHg    pO2, Arterial 96.7 (H) 60.0 - 80.0 mmHg    HCO3, Arterial 15.1 (L) 22.0 - 26.0 mmol/L    B.E. -8.4 (L) -3.0 - 3.0 mmol/L    O2 Sat 97.7 92.0 - 98.5 %     ID 1,102     DEVICE 15,065,521,400,933     Pressure Support 12 cmH2O    Positive End EXP Press 6 cmH2O    Delivery Systems BiPAP    Beta-Hydroxybutyrate   Result Value Ref Range    Beta-Hydroxybutyrate 3.87 (H) 0.02 - 0.27 mmol/L   Lactate, Sepsis   Result Value Ref Range    Lactic Acid, Sepsis 0.9 0.5 - 1.9 mmol/L   Troponin   Result Value Ref Range    Troponin <0.01 0.00 - 0.03 ng/mL   EKG 12 lead   Result Value Ref Range    Ventricular Rate 110 BPM    Atrial Rate 110 BPM    P-R Interval 126 ms    QRS Duration 120 ms    Q-T Interval 386 ms    QTc Calculation (Bazett) 522 ms    P Axis 32 degrees    R Axis 166 degrees    T Axis 24 degrees       RADIOLOGY:  Interpreted by Radiologist.  CTA CHEST W CONTRAST   Final Result   Extensive bilateral pulmonary infiltrates may reflect pneumonia or pulmonary   edema         XR CHEST PORTABLE   Final Result   Multifocal bilateral pneumonia. EKG:  This EKG is signed and interpreted by me. Atrial sensed ventricular paced rhythm, rate of 110, KS interval 126 MS,  MS,  MS, no ST segment elevation or depression. Interpreted by me      ------------------------- NURSING NOTES AND VITALS REVIEWED ---------------------------   The nursing notes within the ED encounter and vital signs as below have been reviewed by myself. BP (!) 142/84   Pulse 94   Temp 101.1 °F (38.4 °C) (Core)   Resp (!) 34   Ht 5' 2\" (1.575 m)   Wt 210 lb (95.3 kg)   SpO2 97%   BMI 38.41 kg/m²   Oxygen Saturation Interpretation: Abnormal    The patients available past medical records and past encounters were reviewed.         ------------------------------ ED COURSE/MEDICAL DECISION MAKING----------------------  Medications   acetaminophen (TYLENOL) tablet 650 mg ( Oral See Alternative 4/12/21 1018)     Or   acetaminophen (TYLENOL) suppository 650 mg (650 mg Rectal Given 4/12/21 1018)   remdesivir 200 mg in sodium chloride 0.9 % 250 mL IVPB (200 mg Intravenous New Bag 4/12/21 1146)     Followed by   remdesivir 100 mg in sodium chloride 0.9 % 250 mL IVPB (has no administration in time range)   0.9 % sodium chloride bolus (has no administration in time range)   cefTRIAXone (ROCEPHIN) 1,000 mg in sterile water 10 mL IV syringe (has no administration in time range)   doxycycline (VIBRAMYCIN) 100 mg in dextrose 5 % 100 mL IVPB (has no administration in time range)   dexamethasone (PF) (DECADRON) injection 10 mg (10 mg Intravenous Given 4/12/21 0996)   ipratropium-albuterol (DUONEB) nebulizer solution 3 ampule (3 ampules Inhalation Given 4/12/21 0850)   0.9 % sodium chloride bolus (0 mLs Intravenous Stopped 4/12/21 1146)   iopamidol (ISOVUE-370) 76 % injection 75 mL (75 mLs Intravenous Given 4/12/21 1132)             Medical Decision Making:   I, Dr. Blanquita Rojo am the primary physician of record. Nirmal Peterson is a 69 y.o. female who presents to the ED for shortness of breath differential diagnosis includes but is not limited to but not limited to, COVID-19, pneumonia, pulmonary embolus the patient does have a past medical history of   has a past medical history of Diabetes mellitus ([address] Hypertension, Irregular heart beat, and Thyroid disease. . The patient was given Decadron, IV fluid, remdesivir, albuterol, Rocephin and doxycycline. Labs and imaging obtained, reviewed. Patient has CBC remarkable for leukocytosis of 17.0, patient did have CMP with mild hyperglycemia with a blood glucose of 339, patient noted to have mildly elevated anion gap at 20, CO2 of 16, pH was 7.39 so patient not in DKA. Patient with elevated procalcitonin. Will cover for community-acquired pneumonia. The patient will be admitted for further treatment and evaluation. Re-Evaluations/Consultations:             ED Course as of Apr 12 1302   Mon Apr 12, 2021   1136 Patient is in the bed no acute distress. The patient did have a trial off BiPAP which was unsuccessful.     [MT]      ED Course User Index  [MT] Dena Tovar DO       Spoke with Dr. Kimber Barker she will accept the patient for admission      This patient's ED course included: History, physical examination, reevaluation prior to disposition, labs, imaging, telemetry monitoring, EKG, IVF, IV medications, critical care    Critical care:  Critical Care: Please note that the withdrawal or failure to initiate urgent interventions for this patient would likely result in a life threatening deterioration or permanent disability. Accordingly this patient received 45 minutes of critical care time, excluding separately billable procedures. This patient has remained hemodynamically stable during their ED course. Counseling: The emergency provider has spoken with the patient and discussed todays results, in addition to providing specific details for the plan of care and counseling regarding the diagnosis and prognosis. Questions are answered at this time and they are agreeable with the plan.       --------------------------------- IMPRESSION AND DISPOSITION ---------------------------------    IMPRESSION  1. Septicemia ([address]    2. Acute respiratory failure with hypoxia (HCC)    3. Hyperglycemia        DISPOSITION  Disposition: Admit to mental health unit - [address] [place]  Patient condition is serious        NOTE: This report was transcribed using voice recognition software.  Every effort was made to ensure accuracy; however, inadvertent computerized transcription errors may be present         Solo Lau DO  04/12/21 4623 Erivedge Counseling- I discussed with the patient the risks of Erivedge including but not limited to nausea, vomiting, diarrhea, constipation, weight loss, changes in the sense of taste, decreased appetite, muscle spasms, and hair loss.  The patient verbalized understanding of the proper use and possible adverse effects of Erivedge.  All of the patient's questions and concerns were addressed.
